# Patient Record
Sex: MALE | Race: WHITE | NOT HISPANIC OR LATINO | URBAN - METROPOLITAN AREA
[De-identification: names, ages, dates, MRNs, and addresses within clinical notes are randomized per-mention and may not be internally consistent; named-entity substitution may affect disease eponyms.]

---

## 2021-10-29 ENCOUNTER — HOSPITAL ENCOUNTER (INPATIENT)
Facility: HOSPITAL | Age: 56
LOS: 10 days | Discharge: HOME | End: 2021-11-08
Attending: EMERGENCY MEDICINE | Admitting: INTERNAL MEDICINE

## 2021-10-29 ENCOUNTER — APPOINTMENT (OUTPATIENT)
Dept: CT IMAGING | Facility: HOSPITAL | Age: 56
End: 2021-10-29

## 2021-10-29 ENCOUNTER — APPOINTMENT (OUTPATIENT)
Dept: GENERAL RADIOLOGY | Facility: HOSPITAL | Age: 56
End: 2021-10-29

## 2021-10-29 ENCOUNTER — APPOINTMENT (OUTPATIENT)
Dept: CARDIOLOGY | Facility: HOSPITAL | Age: 56
End: 2021-10-29

## 2021-10-29 DIAGNOSIS — E87.5 HYPERKALEMIA: ICD-10-CM

## 2021-10-29 DIAGNOSIS — R10.11 RIGHT UPPER QUADRANT ABDOMINAL PAIN: ICD-10-CM

## 2021-10-29 DIAGNOSIS — I48.91 ATRIAL FIBRILLATION WITH RAPID VENTRICULAR RESPONSE (HCC): Primary | ICD-10-CM

## 2021-10-29 DIAGNOSIS — R57.0 CARDIOGENIC SHOCK (HCC): ICD-10-CM

## 2021-10-29 DIAGNOSIS — R79.89 ELEVATED LFTS: ICD-10-CM

## 2021-10-29 DIAGNOSIS — N17.9 AKI (ACUTE KIDNEY INJURY) (HCC): ICD-10-CM

## 2021-10-29 PROBLEM — I34.0 MITRAL INSUFFICIENCY, ACUTE: Status: ACTIVE | Noted: 2021-10-29

## 2021-10-29 LAB
ALBUMIN SERPL-MCNC: 3.6 G/DL (ref 3.5–5.2)
ALBUMIN SERPL-MCNC: 4.4 G/DL (ref 3.5–5.2)
ALBUMIN/GLOB SERPL: 2 G/DL
ALBUMIN/GLOB SERPL: 2.1 G/DL
ALP SERPL-CCNC: 48 U/L (ref 39–117)
ALP SERPL-CCNC: 57 U/L (ref 39–117)
ALT SERPL W P-5'-P-CCNC: 1006 U/L (ref 1–41)
ALT SERPL W P-5'-P-CCNC: 808 U/L (ref 1–41)
ANION GAP SERPL CALCULATED.3IONS-SCNC: 18.8 MMOL/L (ref 5–15)
ANION GAP SERPL CALCULATED.3IONS-SCNC: 19.7 MMOL/L (ref 5–15)
ANION GAP SERPL CALCULATED.3IONS-SCNC: 21.9 MMOL/L (ref 5–15)
AORTIC DIMENSIONLESS INDEX: 0.7 (DI)
APAP SERPL-MCNC: <5 MCG/ML (ref 0–30)
APTT PPP: 28 SECONDS (ref 22.7–35.4)
ARTERIAL PATENCY WRIST A: ABNORMAL
AST SERPL-CCNC: 469 U/L (ref 1–40)
AST SERPL-CCNC: 697 U/L (ref 1–40)
ATMOSPHERIC PRESS: 736.3 MMHG
ATMOSPHERIC PRESS: 738.4 MMHG
BASE EXCESS BLDA CALC-SCNC: -11.7 MMOL/L (ref 0–2)
BASE EXCESS BLDV CALC-SCNC: -7.1 MMOL/L (ref -2–2)
BASOPHILS # BLD AUTO: 0.03 10*3/MM3 (ref 0–0.2)
BASOPHILS NFR BLD AUTO: 0.3 % (ref 0–1.5)
BDY SITE: ABNORMAL
BDY SITE: ABNORMAL
BH CV ECHO MEAS - ACS: 2.3 CM
BH CV ECHO MEAS - AO MAX PG (FULL): 0.28 MMHG
BH CV ECHO MEAS - AO MAX PG: 0.96 MMHG
BH CV ECHO MEAS - AO MEAN PG (FULL): 0.19 MMHG
BH CV ECHO MEAS - AO MEAN PG: 0.58 MMHG
BH CV ECHO MEAS - AO ROOT AREA: 9.5 CM^2
BH CV ECHO MEAS - AO ROOT DIAM: 3.5 CM
BH CV ECHO MEAS - AO V2 MAX: 48.9 CM/SEC
BH CV ECHO MEAS - AO V2 MEAN: 35.7 CM/SEC
BH CV ECHO MEAS - AO V2 VTI: 6.4 CM
BH CV ECHO MEAS - AVA(I,A): 3.2 CM^2
BH CV ECHO MEAS - AVA(I,D): 3.2 CM^2
BH CV ECHO MEAS - AVA(V,A): 3.8 CM^2
BH CV ECHO MEAS - AVA(V,D): 3.8 CM^2
BH CV ECHO MEAS - EDV(CUBED): 254.2 ML
BH CV ECHO MEAS - EDV(MOD-SP2): 178 ML
BH CV ECHO MEAS - EDV(MOD-SP4): 118 ML
BH CV ECHO MEAS - EDV(TEICH): 203.7 ML
BH CV ECHO MEAS - EF(CUBED): 33.8 %
BH CV ECHO MEAS - EF(MOD-BP): 25.3 %
BH CV ECHO MEAS - EF(MOD-SP2): 27 %
BH CV ECHO MEAS - EF(MOD-SP4): 23.7 %
BH CV ECHO MEAS - EF(TEICH): 27 %
BH CV ECHO MEAS - ESV(CUBED): 168.4 ML
BH CV ECHO MEAS - ESV(MOD-SP2): 130 ML
BH CV ECHO MEAS - ESV(MOD-SP4): 90 ML
BH CV ECHO MEAS - ESV(TEICH): 148.8 ML
BH CV ECHO MEAS - FS: 12.8 %
BH CV ECHO MEAS - IVS/LVPW: 0.98
BH CV ECHO MEAS - IVSD: 1.1 CM
BH CV ECHO MEAS - LV MASS(C)D: 313.5 GRAMS
BH CV ECHO MEAS - LV MAX PG: 0.68 MMHG
BH CV ECHO MEAS - LV MEAN PG: 0.39 MMHG
BH CV ECHO MEAS - LV V1 MAX: 41.1 CM/SEC
BH CV ECHO MEAS - LV V1 MEAN: 29.7 CM/SEC
BH CV ECHO MEAS - LV V1 VTI: 4.6 CM
BH CV ECHO MEAS - LVIDD: 6.3 CM
BH CV ECHO MEAS - LVIDS: 5.5 CM
BH CV ECHO MEAS - LVLD AP2: 9.3 CM
BH CV ECHO MEAS - LVLD AP4: 9.3 CM
BH CV ECHO MEAS - LVLS AP2: 8.5 CM
BH CV ECHO MEAS - LVLS AP4: 8.7 CM
BH CV ECHO MEAS - LVOT AREA (M): 4.5 CM^2
BH CV ECHO MEAS - LVOT AREA: 4.5 CM^2
BH CV ECHO MEAS - LVOT DIAM: 2.4 CM
BH CV ECHO MEAS - LVPWD: 1.1 CM
BH CV ECHO MEAS - MED PEAK E' VEL: 5.2 CM/SEC
BH CV ECHO MEAS - MR MAX PG: 35.3 MMHG
BH CV ECHO MEAS - MR MAX VEL: 297.3 CM/SEC
BH CV ECHO MEAS - MR MEAN PG: 19 MMHG
BH CV ECHO MEAS - MR MEAN VEL: 199.3 CM/SEC
BH CV ECHO MEAS - MR VTI: 55.1 CM
BH CV ECHO MEAS - MV DEC SLOPE: 495.1 CM/SEC^2
BH CV ECHO MEAS - MV MAX PG: 2.2 MMHG
BH CV ECHO MEAS - MV MEAN PG: 1 MMHG
BH CV ECHO MEAS - MV P1/2T MAX VEL: 77.5 CM/SEC
BH CV ECHO MEAS - MV P1/2T: 45.9 MSEC
BH CV ECHO MEAS - MV V2 MAX: 72.7 CM/SEC
BH CV ECHO MEAS - MV V2 MEAN: 46.3 CM/SEC
BH CV ECHO MEAS - MV V2 VTI: 17.1 CM
BH CV ECHO MEAS - MVA P1/2T LCG: 2.8 CM^2
BH CV ECHO MEAS - MVA(P1/2T): 4.8 CM^2
BH CV ECHO MEAS - MVA(VTI): 1.2 CM^2
BH CV ECHO MEAS - RAP SYSTOLE: 15 MMHG
BH CV ECHO MEAS - RVSP: 38 MMHG
BH CV ECHO MEAS - SV(AO): 60.7 ML
BH CV ECHO MEAS - SV(CUBED): 85.8 ML
BH CV ECHO MEAS - SV(LVOT): 20.7 ML
BH CV ECHO MEAS - SV(MOD-SP2): 48 ML
BH CV ECHO MEAS - SV(MOD-SP4): 28 ML
BH CV ECHO MEAS - SV(TEICH): 54.9 ML
BH CV ECHO MEAS - TAPSE (>1.6): 0.6 CM
BH CV ECHO MEAS - TR MAX VEL: 236.2 CM/SEC
BH CV XLRA - RV BASE: 4.1 CM
BH CV XLRA - RV LENGTH: 8.3 CM
BH CV XLRA - RV MID: 3.2 CM
BH CV XLRA - TDI S': 6.2 CM/SEC
BILIRUB SERPL-MCNC: 2.4 MG/DL (ref 0–1.2)
BILIRUB SERPL-MCNC: 2.5 MG/DL (ref 0–1.2)
BUN SERPL-MCNC: 41 MG/DL (ref 6–20)
BUN SERPL-MCNC: 42 MG/DL (ref 6–20)
BUN SERPL-MCNC: 43 MG/DL (ref 6–20)
BUN/CREAT SERPL: 20.7 (ref 7–25)
BUN/CREAT SERPL: 21.1 (ref 7–25)
BUN/CREAT SERPL: 22.7 (ref 7–25)
CALCIUM SPEC-SCNC: 7.4 MG/DL (ref 8.6–10.5)
CALCIUM SPEC-SCNC: 8.2 MG/DL (ref 8.6–10.5)
CALCIUM SPEC-SCNC: 9.5 MG/DL (ref 8.6–10.5)
CHLORIDE SERPL-SCNC: 101 MMOL/L (ref 98–107)
CHLORIDE SERPL-SCNC: 92 MMOL/L (ref 98–107)
CHLORIDE SERPL-SCNC: 95 MMOL/L (ref 98–107)
CO2 SERPL-SCNC: 11.2 MMOL/L (ref 22–29)
CO2 SERPL-SCNC: 16.3 MMOL/L (ref 22–29)
CO2 SERPL-SCNC: 17.1 MMOL/L (ref 22–29)
CREAT SERPL-MCNC: 1.81 MG/DL (ref 0.76–1.27)
CREAT SERPL-MCNC: 2.03 MG/DL (ref 0.76–1.27)
CREAT SERPL-MCNC: 2.04 MG/DL (ref 0.76–1.27)
D-LACTATE SERPL-SCNC: 6 MMOL/L (ref 0.5–2)
D-LACTATE SERPL-SCNC: 7 MMOL/L (ref 0.5–2)
D-LACTATE SERPL-SCNC: 7.2 MMOL/L (ref 0.5–2)
DEPRECATED RDW RBC AUTO: 44.9 FL (ref 37–54)
DEPRECATED RDW RBC AUTO: 45.3 FL (ref 37–54)
EOSINOPHIL # BLD AUTO: 0 10*3/MM3 (ref 0–0.4)
EOSINOPHIL NFR BLD AUTO: 0 % (ref 0.3–6.2)
ERYTHROCYTE [DISTWIDTH] IN BLOOD BY AUTOMATED COUNT: 13.6 % (ref 12.3–15.4)
ERYTHROCYTE [DISTWIDTH] IN BLOOD BY AUTOMATED COUNT: 13.8 % (ref 12.3–15.4)
GAS FLOW AIRWAY: 2 LPM
GFR SERPL CREATININE-BSD FRML MDRD: 34 ML/MIN/1.73
GFR SERPL CREATININE-BSD FRML MDRD: 34 ML/MIN/1.73
GFR SERPL CREATININE-BSD FRML MDRD: 39 ML/MIN/1.73
GLOBULIN UR ELPH-MCNC: 1.7 GM/DL
GLOBULIN UR ELPH-MCNC: 2.2 GM/DL
GLUCOSE BLDC GLUCOMTR-MCNC: 110 MG/DL (ref 70–130)
GLUCOSE SERPL-MCNC: 130 MG/DL (ref 65–99)
GLUCOSE SERPL-MCNC: 136 MG/DL (ref 65–99)
GLUCOSE SERPL-MCNC: 194 MG/DL (ref 65–99)
HAV IGM SERPL QL IA: NORMAL
HBV CORE IGM SERPL QL IA: NORMAL
HBV SURFACE AG SERPL QL IA: NORMAL
HCO3 BLDA-SCNC: 14.9 MMOL/L (ref 22–28)
HCO3 BLDV-SCNC: 18.6 MMOL/L (ref 22–28)
HCT VFR BLD AUTO: 48.4 % (ref 37.5–51)
HCT VFR BLD AUTO: 53.3 % (ref 37.5–51)
HCV AB SER DONR QL: NORMAL
HGB BLD-MCNC: 16.4 G/DL (ref 13–17.7)
HGB BLD-MCNC: 18 G/DL (ref 13–17.7)
HOLD SPECIMEN: NORMAL
INHALED O2 CONCENTRATION: 100 %
INR PPP: 1.94 (ref 0.9–1.1)
LIPASE SERPL-CCNC: 32 U/L (ref 13–60)
LYMPHOCYTES # BLD AUTO: 0.9 10*3/MM3 (ref 0.7–3.1)
LYMPHOCYTES NFR BLD AUTO: 8.8 % (ref 19.6–45.3)
MAGNESIUM SERPL-MCNC: 2.3 MG/DL (ref 1.6–2.6)
MAGNESIUM SERPL-MCNC: 2.5 MG/DL (ref 1.6–2.6)
MCH RBC QN AUTO: 30.8 PG (ref 26.6–33)
MCH RBC QN AUTO: 31 PG (ref 26.6–33)
MCHC RBC AUTO-ENTMCNC: 33.8 G/DL (ref 31.5–35.7)
MCHC RBC AUTO-ENTMCNC: 33.9 G/DL (ref 31.5–35.7)
MCV RBC AUTO: 90.8 FL (ref 79–97)
MCV RBC AUTO: 91.9 FL (ref 79–97)
MODALITY: ABNORMAL
MODALITY: ABNORMAL
MONOCYTES # BLD AUTO: 0.83 10*3/MM3 (ref 0.1–0.9)
MONOCYTES NFR BLD AUTO: 8.1 % (ref 5–12)
MR PISA EROA: 0.72 CM2
NEUTROPHILS NFR BLD AUTO: 8.38 10*3/MM3 (ref 1.7–7)
NEUTROPHILS NFR BLD AUTO: 81.8 % (ref 42.7–76)
NT-PROBNP SERPL-MCNC: ABNORMAL PG/ML (ref 0–900)
O2 A-A PPRESDIFF RESPIRATORY: 0.5 MMHG
PCO2 BLDA: 35.4 MM HG (ref 35–45)
PCO2 BLDV: 37.4 MM HG (ref 41–51)
PEEP RESPIRATORY: 5 CM[H2O]
PH BLDA: 7.23 PH UNITS (ref 7.35–7.45)
PH BLDV: 7.3 PH UNITS (ref 7.31–7.41)
PISA ALIASING VEL: 5.3 M/S
PISA RADIUS: 0.8 CM
PLATELET # BLD AUTO: 131 10*3/MM3 (ref 140–450)
PLATELET # BLD AUTO: 98 10*3/MM3 (ref 140–450)
PMV BLD AUTO: 13.1 FL (ref 6–12)
PMV BLD AUTO: 13.2 FL (ref 6–12)
PO2 BLDA: 352.2 MM HG (ref 80–100)
PO2 BLDV: 15.8 MM HG (ref 35–45)
POTASSIUM SERPL-SCNC: 5.2 MMOL/L (ref 3.5–5.2)
POTASSIUM SERPL-SCNC: 5.4 MMOL/L (ref 3.5–5.2)
POTASSIUM SERPL-SCNC: 6.2 MMOL/L (ref 3.5–5.2)
PROCALCITONIN SERPL-MCNC: 0.14 NG/ML (ref 0–0.25)
PROT SERPL-MCNC: 5.3 G/DL (ref 6–8.5)
PROT SERPL-MCNC: 6.6 G/DL (ref 6–8.5)
PROTHROMBIN TIME: 21.9 SECONDS (ref 11.7–14.2)
QT INTERVAL: 282 MS
RBC # BLD AUTO: 5.33 10*6/MM3 (ref 4.14–5.8)
RBC # BLD AUTO: 5.8 10*6/MM3 (ref 4.14–5.8)
SALICYLATES SERPL-MCNC: 4.7 MG/DL
SAO2 % BLDCOA: 18.2 % (ref 92–99)
SAO2 % BLDCOA: 99.9 % (ref 92–99)
SARS-COV-2 RNA PNL SPEC NAA+PROBE: NOT DETECTED
SET MECH RESP RATE: 16
SINUS: 3.4 CM
SODIUM SERPL-SCNC: 131 MMOL/L (ref 136–145)
STJ: 2.9 CM
TOTAL RATE: 21 BREATHS/MINUTE
TROPONIN T SERPL-MCNC: 0.04 NG/ML (ref 0–0.03)
VENTILATOR MODE: AC
VT ON VENT VENT: 500 ML
WBC # BLD AUTO: 10.24 10*3/MM3 (ref 3.4–10.8)
WBC # BLD AUTO: 11.66 10*3/MM3 (ref 3.4–10.8)
WHOLE BLOOD HOLD SPECIMEN: NORMAL
WHOLE BLOOD HOLD SPECIMEN: NORMAL

## 2021-10-29 PROCEDURE — 80048 BASIC METABOLIC PNL TOTAL CA: CPT | Performed by: EMERGENCY MEDICINE

## 2021-10-29 PROCEDURE — 93460 R&L HRT ART/VENTRICLE ANGIO: CPT | Performed by: INTERNAL MEDICINE

## 2021-10-29 PROCEDURE — C1894 INTRO/SHEATH, NON-LASER: HCPCS | Performed by: INTERNAL MEDICINE

## 2021-10-29 PROCEDURE — 83605 ASSAY OF LACTIC ACID: CPT | Performed by: INTERNAL MEDICINE

## 2021-10-29 PROCEDURE — 94799 UNLISTED PULMONARY SVC/PX: CPT

## 2021-10-29 PROCEDURE — 25010000002 DIGOXIN PER 500 MCG: Performed by: EMERGENCY MEDICINE

## 2021-10-29 PROCEDURE — 99255 IP/OBS CONSLTJ NEW/EST HI 80: CPT | Performed by: INTERNAL MEDICINE

## 2021-10-29 PROCEDURE — 85025 COMPLETE CBC W/AUTO DIFF WBC: CPT | Performed by: EMERGENCY MEDICINE

## 2021-10-29 PROCEDURE — 25010000002 MIDAZOLAM PER 1 MG

## 2021-10-29 PROCEDURE — 63710000001 INSULIN REGULAR HUMAN PER 5 UNITS: Performed by: EMERGENCY MEDICINE

## 2021-10-29 PROCEDURE — G0278 ILIAC ART ANGIO,CARDIAC CATH: HCPCS

## 2021-10-29 PROCEDURE — 80143 DRUG ASSAY ACETAMINOPHEN: CPT | Performed by: INTERNAL MEDICINE

## 2021-10-29 PROCEDURE — 93321 DOPPLER ECHO F-UP/LMTD STD: CPT | Performed by: INTERNAL MEDICINE

## 2021-10-29 PROCEDURE — 87040 BLOOD CULTURE FOR BACTERIA: CPT | Performed by: INTERNAL MEDICINE

## 2021-10-29 PROCEDURE — G0278 ILIAC ART ANGIO,CARDIAC CATH: HCPCS | Performed by: INTERNAL MEDICINE

## 2021-10-29 PROCEDURE — 85018 HEMOGLOBIN: CPT

## 2021-10-29 PROCEDURE — 85014 HEMATOCRIT: CPT

## 2021-10-29 PROCEDURE — 25010000002 CALCIUM GLUCONATE-NACL 1-0.675 GM/50ML-% SOLUTION: Performed by: EMERGENCY MEDICINE

## 2021-10-29 PROCEDURE — 93325 DOPPLER ECHO COLOR FLOW MAPG: CPT | Performed by: INTERNAL MEDICINE

## 2021-10-29 PROCEDURE — 83735 ASSAY OF MAGNESIUM: CPT | Performed by: INTERNAL MEDICINE

## 2021-10-29 PROCEDURE — 25010000002 FENTANYL CITRATE (PF) 50 MCG/ML SOLUTION: Performed by: INTERNAL MEDICINE

## 2021-10-29 PROCEDURE — 83735 ASSAY OF MAGNESIUM: CPT | Performed by: EMERGENCY MEDICINE

## 2021-10-29 PROCEDURE — C1769 GUIDE WIRE: HCPCS | Performed by: INTERNAL MEDICINE

## 2021-10-29 PROCEDURE — 25010000002 PHENYLEPHRINE 10 MG/ML SOLUTION

## 2021-10-29 PROCEDURE — 93306 TTE W/DOPPLER COMPLETE: CPT

## 2021-10-29 PROCEDURE — 85027 COMPLETE CBC AUTOMATED: CPT | Performed by: INTERNAL MEDICINE

## 2021-10-29 PROCEDURE — 31500 INSERT EMERGENCY AIRWAY: CPT

## 2021-10-29 PROCEDURE — 25010000002 AMIODARONE IN DEXTROSE 5% 360-4.14 MG/200ML-% SOLUTION: Performed by: EMERGENCY MEDICINE

## 2021-10-29 PROCEDURE — 94002 VENT MGMT INPAT INIT DAY: CPT

## 2021-10-29 PROCEDURE — 0BH17EZ INSERTION OF ENDOTRACHEAL AIRWAY INTO TRACHEA, VIA NATURAL OR ARTIFICIAL OPENING: ICD-10-PCS | Performed by: INTERNAL MEDICINE

## 2021-10-29 PROCEDURE — 93325 DOPPLER ECHO COLOR FLOW MAPG: CPT

## 2021-10-29 PROCEDURE — 80074 ACUTE HEPATITIS PANEL: CPT | Performed by: INTERNAL MEDICINE

## 2021-10-29 PROCEDURE — 80053 COMPREHEN METABOLIC PANEL: CPT | Performed by: INTERNAL MEDICINE

## 2021-10-29 PROCEDURE — 25010000002 PROPOFOL 10 MG/ML EMULSION: Performed by: INTERNAL MEDICINE

## 2021-10-29 PROCEDURE — 93010 ELECTROCARDIOGRAM REPORT: CPT | Performed by: INTERNAL MEDICINE

## 2021-10-29 PROCEDURE — 82962 GLUCOSE BLOOD TEST: CPT

## 2021-10-29 PROCEDURE — 25010000002 ONDANSETRON PER 1 MG: Performed by: EMERGENCY MEDICINE

## 2021-10-29 PROCEDURE — 83690 ASSAY OF LIPASE: CPT | Performed by: EMERGENCY MEDICINE

## 2021-10-29 PROCEDURE — 83880 ASSAY OF NATRIURETIC PEPTIDE: CPT | Performed by: EMERGENCY MEDICINE

## 2021-10-29 PROCEDURE — 0 IOPAMIDOL PER 1 ML: Performed by: INTERNAL MEDICINE

## 2021-10-29 PROCEDURE — 93312 ECHO TRANSESOPHAGEAL: CPT

## 2021-10-29 PROCEDURE — 93321 DOPPLER ECHO F-UP/LMTD STD: CPT

## 2021-10-29 PROCEDURE — 76376 3D RENDER W/INTRP POSTPROCES: CPT

## 2021-10-29 PROCEDURE — 94760 N-INVAS EAR/PLS OXIMETRY 1: CPT

## 2021-10-29 PROCEDURE — 87635 SARS-COV-2 COVID-19 AMP PRB: CPT | Performed by: EMERGENCY MEDICINE

## 2021-10-29 PROCEDURE — 85025 COMPLETE CBC W/AUTO DIFF WBC: CPT | Performed by: INTERNAL MEDICINE

## 2021-10-29 PROCEDURE — 76376 3D RENDER W/INTRP POSTPROCES: CPT | Performed by: INTERNAL MEDICINE

## 2021-10-29 PROCEDURE — 71250 CT THORAX DX C-: CPT

## 2021-10-29 PROCEDURE — 80053 COMPREHEN METABOLIC PANEL: CPT | Performed by: EMERGENCY MEDICINE

## 2021-10-29 PROCEDURE — 25010000002 PHENYLEPHRINE 10 MG/ML SOLUTION: Performed by: EMERGENCY MEDICINE

## 2021-10-29 PROCEDURE — 93005 ELECTROCARDIOGRAM TRACING: CPT | Performed by: EMERGENCY MEDICINE

## 2021-10-29 PROCEDURE — 85730 THROMBOPLASTIN TIME PARTIAL: CPT | Performed by: EMERGENCY MEDICINE

## 2021-10-29 PROCEDURE — 71045 X-RAY EXAM CHEST 1 VIEW: CPT

## 2021-10-29 PROCEDURE — 82803 BLOOD GASES ANY COMBINATION: CPT

## 2021-10-29 PROCEDURE — B2111ZZ FLUOROSCOPY OF MULTIPLE CORONARY ARTERIES USING LOW OSMOLAR CONTRAST: ICD-10-PCS | Performed by: INTERNAL MEDICINE

## 2021-10-29 PROCEDURE — 83605 ASSAY OF LACTIC ACID: CPT | Performed by: EMERGENCY MEDICINE

## 2021-10-29 PROCEDURE — 74176 CT ABD & PELVIS W/O CONTRAST: CPT

## 2021-10-29 PROCEDURE — 84484 ASSAY OF TROPONIN QUANT: CPT | Performed by: EMERGENCY MEDICINE

## 2021-10-29 PROCEDURE — 93306 TTE W/DOPPLER COMPLETE: CPT | Performed by: INTERNAL MEDICINE

## 2021-10-29 PROCEDURE — 25010000002 HEPARIN (PORCINE) PER 1000 UNITS: Performed by: INTERNAL MEDICINE

## 2021-10-29 PROCEDURE — 5A1945Z RESPIRATORY VENTILATION, 24-96 CONSECUTIVE HOURS: ICD-10-PCS | Performed by: INTERNAL MEDICINE

## 2021-10-29 PROCEDURE — 4A023N8 MEASUREMENT OF CARDIAC SAMPLING AND PRESSURE, BILATERAL, PERCUTANEOUS APPROACH: ICD-10-PCS | Performed by: INTERNAL MEDICINE

## 2021-10-29 PROCEDURE — 25010000002 PROPOFOL 10 MG/ML EMULSION: Performed by: EMERGENCY MEDICINE

## 2021-10-29 PROCEDURE — B2151ZZ FLUOROSCOPY OF LEFT HEART USING LOW OSMOLAR CONTRAST: ICD-10-PCS | Performed by: INTERNAL MEDICINE

## 2021-10-29 PROCEDURE — 84145 PROCALCITONIN (PCT): CPT | Performed by: EMERGENCY MEDICINE

## 2021-10-29 PROCEDURE — 85610 PROTHROMBIN TIME: CPT | Performed by: EMERGENCY MEDICINE

## 2021-10-29 PROCEDURE — 80179 DRUG ASSAY SALICYLATE: CPT | Performed by: INTERNAL MEDICINE

## 2021-10-29 PROCEDURE — 99284 EMERGENCY DEPT VISIT MOD MDM: CPT

## 2021-10-29 PROCEDURE — 93312 ECHO TRANSESOPHAGEAL: CPT | Performed by: INTERNAL MEDICINE

## 2021-10-29 RX ORDER — SODIUM BICARBONATE IN D5W 150/1000ML
150 PLASTIC BAG, INJECTION (ML) INTRAVENOUS CONTINUOUS
Status: DISCONTINUED | OUTPATIENT
Start: 2021-10-29 | End: 2021-10-31

## 2021-10-29 RX ORDER — DIGOXIN 0.25 MG/ML
250 INJECTION INTRAMUSCULAR; INTRAVENOUS ONCE
Status: COMPLETED | OUTPATIENT
Start: 2021-10-29 | End: 2021-10-29

## 2021-10-29 RX ORDER — LIDOCAINE HYDROCHLORIDE 20 MG/ML
INJECTION, SOLUTION INFILTRATION; PERINEURAL AS NEEDED
Status: DISCONTINUED | OUTPATIENT
Start: 2021-10-29 | End: 2021-10-29 | Stop reason: HOSPADM

## 2021-10-29 RX ORDER — SODIUM BICARBONATE IN D5W 150/1000ML
150 PLASTIC BAG, INJECTION (ML) INTRAVENOUS ONCE
Status: DISCONTINUED | OUTPATIENT
Start: 2021-10-29 | End: 2021-10-29

## 2021-10-29 RX ORDER — MIDAZOLAM HYDROCHLORIDE 1 MG/ML
INJECTION INTRAMUSCULAR; INTRAVENOUS
Status: COMPLETED
Start: 2021-10-29 | End: 2021-10-29

## 2021-10-29 RX ORDER — PROPOFOL 10 MG/ML
VIAL (ML) INTRAVENOUS
Status: COMPLETED | OUTPATIENT
Start: 2021-10-29 | End: 2021-10-29

## 2021-10-29 RX ORDER — SODIUM CHLORIDE 0.9 % (FLUSH) 0.9 %
10 SYRINGE (ML) INJECTION AS NEEDED
Status: DISCONTINUED | OUTPATIENT
Start: 2021-10-29 | End: 2021-11-01

## 2021-10-29 RX ORDER — NOREPINEPHRINE BIT/0.9 % NACL 8 MG/250ML
.02-.3 INFUSION BOTTLE (ML) INTRAVENOUS
Status: DISCONTINUED | OUTPATIENT
Start: 2021-10-29 | End: 2021-11-02

## 2021-10-29 RX ORDER — PANTOPRAZOLE SODIUM 40 MG/10ML
40 INJECTION, POWDER, LYOPHILIZED, FOR SOLUTION INTRAVENOUS DAILY
Status: DISCONTINUED | OUTPATIENT
Start: 2021-10-29 | End: 2021-11-04

## 2021-10-29 RX ORDER — PHENYLEPHRINE HCL IN 0.9% NACL 0.5 MG/5ML
.5-3 SYRINGE (ML) INTRAVENOUS
Status: DISCONTINUED | OUTPATIENT
Start: 2021-10-29 | End: 2021-11-02

## 2021-10-29 RX ORDER — ESMOLOL HYDROCHLORIDE 10 MG/ML
50-300 INJECTION, SOLUTION INTRAVENOUS
Status: DISCONTINUED | OUTPATIENT
Start: 2021-10-29 | End: 2021-10-29

## 2021-10-29 RX ORDER — HYDROCODONE BITARTRATE AND ACETAMINOPHEN 5; 325 MG/1; MG/1
1 TABLET ORAL EVERY 4 HOURS PRN
Status: DISCONTINUED | OUTPATIENT
Start: 2021-10-29 | End: 2021-11-08 | Stop reason: HOSPADM

## 2021-10-29 RX ORDER — ETOMIDATE 2 MG/ML
INJECTION INTRAVENOUS
Status: COMPLETED | OUTPATIENT
Start: 2021-10-29 | End: 2021-10-29

## 2021-10-29 RX ORDER — SODIUM CHLORIDE 9 MG/ML
250 INJECTION, SOLUTION INTRAVENOUS ONCE AS NEEDED
Status: DISCONTINUED | OUTPATIENT
Start: 2021-10-29 | End: 2021-11-01

## 2021-10-29 RX ORDER — SODIUM CHLORIDE 0.9 % (FLUSH) 0.9 %
10 SYRINGE (ML) INJECTION AS NEEDED
Status: DISCONTINUED | OUTPATIENT
Start: 2021-10-29 | End: 2021-11-06

## 2021-10-29 RX ORDER — PROPOFOL 10 MG/ML
VIAL (ML) INTRAVENOUS
Status: ACTIVE
Start: 2021-10-29 | End: 2021-10-30

## 2021-10-29 RX ORDER — CALCIUM GLUCONATE 20 MG/ML
1 INJECTION, SOLUTION INTRAVENOUS ONCE
Status: COMPLETED | OUTPATIENT
Start: 2021-10-29 | End: 2021-10-29

## 2021-10-29 RX ORDER — SODIUM CHLORIDE 0.9 % (FLUSH) 0.9 %
10 SYRINGE (ML) INJECTION EVERY 12 HOURS SCHEDULED
Status: DISCONTINUED | OUTPATIENT
Start: 2021-10-29 | End: 2021-11-08 | Stop reason: HOSPADM

## 2021-10-29 RX ORDER — SODIUM BICARBONATE IN D5W 150/1000ML
150 PLASTIC BAG, INJECTION (ML) INTRAVENOUS CONTINUOUS
Status: DISCONTINUED | OUTPATIENT
Start: 2021-10-29 | End: 2021-10-29

## 2021-10-29 RX ORDER — ONDANSETRON 2 MG/ML
4 INJECTION INTRAMUSCULAR; INTRAVENOUS ONCE
Status: COMPLETED | OUTPATIENT
Start: 2021-10-29 | End: 2021-10-29

## 2021-10-29 RX ORDER — DILTIAZEM HCL IN NACL,ISO-OSM 125 MG/125
5-15 PLASTIC BAG, INJECTION (ML) INTRAVENOUS
Status: DISCONTINUED | OUTPATIENT
Start: 2021-10-29 | End: 2021-10-29

## 2021-10-29 RX ORDER — FENTANYL CITRATE 50 UG/ML
INJECTION, SOLUTION INTRAMUSCULAR; INTRAVENOUS AS NEEDED
Status: DISCONTINUED | OUTPATIENT
Start: 2021-10-29 | End: 2021-10-29 | Stop reason: HOSPADM

## 2021-10-29 RX ORDER — MORPHINE SULFATE 2 MG/ML
1 INJECTION, SOLUTION INTRAMUSCULAR; INTRAVENOUS EVERY 4 HOURS PRN
Status: ACTIVE | OUTPATIENT
Start: 2021-10-29 | End: 2021-11-05

## 2021-10-29 RX ORDER — SODIUM CHLORIDE 9 MG/ML
INJECTION, SOLUTION INTRAVENOUS CONTINUOUS PRN
Status: COMPLETED | OUTPATIENT
Start: 2021-10-29 | End: 2021-10-29

## 2021-10-29 RX ORDER — FENTANYL CITRATE 50 UG/ML
50 INJECTION, SOLUTION INTRAMUSCULAR; INTRAVENOUS
Status: DISPENSED | OUTPATIENT
Start: 2021-10-29 | End: 2021-11-05

## 2021-10-29 RX ORDER — FUROSEMIDE 10 MG/ML
60 INJECTION INTRAMUSCULAR; INTRAVENOUS ONCE
Status: DISCONTINUED | OUTPATIENT
Start: 2021-10-29 | End: 2021-10-29

## 2021-10-29 RX ORDER — NALOXONE HCL 0.4 MG/ML
0.4 VIAL (ML) INJECTION
Status: DISCONTINUED | OUTPATIENT
Start: 2021-10-29 | End: 2021-11-08 | Stop reason: HOSPADM

## 2021-10-29 RX ORDER — DEXTROSE MONOHYDRATE 25 G/50ML
50 INJECTION, SOLUTION INTRAVENOUS ONCE
Status: COMPLETED | OUTPATIENT
Start: 2021-10-29 | End: 2021-10-29

## 2021-10-29 RX ADMIN — Medication 0.36 MCG/KG/MIN: at 20:57

## 2021-10-29 RX ADMIN — PANTOPRAZOLE SODIUM 40 MG: 40 INJECTION, POWDER, FOR SOLUTION INTRAVENOUS at 16:15

## 2021-10-29 RX ADMIN — PHENYLEPHRINE HYDROCHLORIDE 3 MCG/KG/MIN: 10 INJECTION INTRAVENOUS at 19:46

## 2021-10-29 RX ADMIN — ONDANSETRON 4 MG: 2 INJECTION INTRAMUSCULAR; INTRAVENOUS at 12:53

## 2021-10-29 RX ADMIN — DEXTROSE MONOHYDRATE 50 ML: 25 INJECTION, SOLUTION INTRAVENOUS at 14:34

## 2021-10-29 RX ADMIN — SODIUM ZIRCONIUM CYCLOSILICATE 10 G: 10 POWDER, FOR SUSPENSION ORAL at 16:09

## 2021-10-29 RX ADMIN — PHENYLEPHRINE HYDROCHLORIDE 2 MCG/KG/MIN: 10 INJECTION INTRAVENOUS at 16:44

## 2021-10-29 RX ADMIN — SODIUM CHLORIDE, PRESERVATIVE FREE 10 ML: 5 INJECTION INTRAVENOUS at 21:14

## 2021-10-29 RX ADMIN — PROPOFOL 20 MCG/KG/MIN: 10 INJECTION, EMULSION INTRAVENOUS at 16:50

## 2021-10-29 RX ADMIN — Medication 0.6 MCG/KG/MIN: at 18:36

## 2021-10-29 RX ADMIN — SODIUM CHLORIDE 1000 ML: 9 INJECTION, SOLUTION INTRAVENOUS at 13:40

## 2021-10-29 RX ADMIN — Medication 150 MEQ: at 16:10

## 2021-10-29 RX ADMIN — ETOMIDATE 10 MG: 2 INJECTION, SOLUTION INTRAVENOUS at 16:34

## 2021-10-29 RX ADMIN — SODIUM CHLORIDE 1000 ML: 9 INJECTION, SOLUTION INTRAVENOUS at 12:53

## 2021-10-29 RX ADMIN — CALCIUM GLUCONATE 1 G: 20 INJECTION, SOLUTION INTRAVENOUS at 14:40

## 2021-10-29 RX ADMIN — ETOMIDATE 30 MG: 2 INJECTION, SOLUTION INTRAVENOUS at 16:30

## 2021-10-29 RX ADMIN — SODIUM BICARBONATE 50 MEQ: 84 INJECTION, SOLUTION INTRAVENOUS at 14:39

## 2021-10-29 RX ADMIN — Medication 150 MEQ: at 18:01

## 2021-10-29 RX ADMIN — PROPOFOL 10 MCG/KG/MIN: 10 INJECTION, EMULSION INTRAVENOUS at 16:35

## 2021-10-29 RX ADMIN — DIGOXIN 250 MCG: 0.25 INJECTION INTRAMUSCULAR; INTRAVENOUS at 16:26

## 2021-10-29 RX ADMIN — INSULIN HUMAN 10 UNITS: 100 INJECTION, SOLUTION PARENTERAL at 14:36

## 2021-10-29 RX ADMIN — MIDAZOLAM 2 MG: 1 INJECTION INTRAMUSCULAR; INTRAVENOUS at 16:52

## 2021-10-29 RX ADMIN — AMIODARONE HYDROCHLORIDE 1 MG/MIN: 1.8 INJECTION, SOLUTION INTRAVENOUS at 13:38

## 2021-10-29 RX ADMIN — ESMOLOL HYDROCHLORIDE 50 MCG/KG/MIN: 10 INJECTION INTRAVENOUS at 14:42

## 2021-10-29 RX ADMIN — Medication 0.03 MCG/KG/MIN: at 16:23

## 2021-10-29 RX ADMIN — PROPOFOL 50 MCG/KG/MIN: 10 INJECTION, EMULSION INTRAVENOUS at 20:34

## 2021-10-30 ENCOUNTER — APPOINTMENT (OUTPATIENT)
Dept: CARDIOLOGY | Facility: HOSPITAL | Age: 56
End: 2021-10-30

## 2021-10-30 ENCOUNTER — APPOINTMENT (OUTPATIENT)
Dept: GENERAL RADIOLOGY | Facility: HOSPITAL | Age: 56
End: 2021-10-30

## 2021-10-30 PROBLEM — D69.6 THROMBOCYTOPENIA (HCC): Status: ACTIVE | Noted: 2021-10-30

## 2021-10-30 PROBLEM — D68.9 COAGULOPATHY (HCC): Status: ACTIVE | Noted: 2021-10-30

## 2021-10-30 LAB
ABO GROUP BLD: NORMAL
ACT BLD: 125 SECONDS (ref 82–152)
ACT BLD: 219 SECONDS (ref 82–152)
ACT BLD: 246 SECONDS (ref 82–152)
ACT BLD: 257 SECONDS (ref 82–152)
ALBUMIN SERPL-MCNC: 2.8 G/DL (ref 3.5–5.2)
ALBUMIN SERPL-MCNC: 3.3 G/DL (ref 3.5–5.2)
ALBUMIN/GLOB SERPL: 1.8 G/DL
ALBUMIN/GLOB SERPL: 1.9 G/DL
ALP SERPL-CCNC: 47 U/L (ref 39–117)
ALP SERPL-CCNC: 49 U/L (ref 39–117)
ALT SERPL W P-5'-P-CCNC: 3539 U/L (ref 1–41)
ALT SERPL W P-5'-P-CCNC: 5772 U/L (ref 1–41)
ANION GAP SERPL CALCULATED.3IONS-SCNC: 15.8 MMOL/L (ref 5–15)
ANION GAP SERPL CALCULATED.3IONS-SCNC: 17.1 MMOL/L (ref 5–15)
APTT PPP: 41.5 SECONDS (ref 22.7–35.4)
ARTERIAL PATENCY WRIST A: ABNORMAL
AST SERPL-CCNC: 3447 U/L (ref 1–40)
AST SERPL-CCNC: >7000 U/L (ref 1–40)
ATMOSPHERIC PRESS: 736.1 MMHG
ATMOSPHERIC PRESS: 737.9 MMHG
ATMOSPHERIC PRESS: 738.5 MMHG
BACTERIA UR QL AUTO: ABNORMAL /HPF
BASE EXCESS BLDA CALC-SCNC: -1.2 MMOL/L (ref 0–2)
BASE EXCESS BLDA CALC-SCNC: -2.4 MMOL/L (ref 0–2)
BASE EXCESS BLDA CALC-SCNC: -6.1 MMOL/L (ref 0–2)
BASOPHILS # BLD AUTO: 0.02 10*3/MM3 (ref 0–0.2)
BASOPHILS # BLD AUTO: 0.02 10*3/MM3 (ref 0–0.2)
BASOPHILS NFR BLD AUTO: 0.1 % (ref 0–1.5)
BASOPHILS NFR BLD AUTO: 0.2 % (ref 0–1.5)
BDY SITE: ABNORMAL
BH BB BLOOD EXPIRATION DATE: NORMAL
BH BB BLOOD EXPIRATION DATE: NORMAL
BH BB BLOOD TYPE BARCODE: 8400
BH BB BLOOD TYPE BARCODE: 8400
BH BB DISPENSE STATUS: NORMAL
BH BB DISPENSE STATUS: NORMAL
BH BB PRODUCT CODE: NORMAL
BH BB PRODUCT CODE: NORMAL
BH BB UNIT NUMBER: NORMAL
BH BB UNIT NUMBER: NORMAL
BH CV ECHO MEAS - RAP SYSTOLE: 15 MMHG
BH CV ECHO MEAS - RVSP: 42 MMHG
BH CV ECHO MEAS - TR MAX VEL: 135.7 CM/SEC
BH CV ECHO MEAS - TR MAX VEL: 255 CM/SEC
BILIRUB SERPL-MCNC: 2.3 MG/DL (ref 0–1.2)
BILIRUB SERPL-MCNC: 2.8 MG/DL (ref 0–1.2)
BILIRUB UR QL STRIP: ABNORMAL
BLD GP AB SCN SERPL QL: NEGATIVE
BUN SERPL-MCNC: 43 MG/DL (ref 6–20)
BUN SERPL-MCNC: 46 MG/DL (ref 6–20)
BUN/CREAT SERPL: 17.6 (ref 7–25)
BUN/CREAT SERPL: 19.8 (ref 7–25)
CA-I BLD-MCNC: 4 MG/DL (ref 4.6–5.4)
CA-I BLD-MCNC: 4.1 MG/DL (ref 4.6–5.4)
CA-I SERPL ISE-MCNC: 1.01 MMOL/L (ref 1.15–1.35)
CA-I SERPL ISE-MCNC: 1.03 MMOL/L (ref 1.15–1.35)
CALCIUM SPEC-SCNC: 7.1 MG/DL (ref 8.6–10.5)
CALCIUM SPEC-SCNC: 7.4 MG/DL (ref 8.6–10.5)
CHLORIDE SERPL-SCNC: 101 MMOL/L (ref 98–107)
CHLORIDE SERPL-SCNC: 98 MMOL/L (ref 98–107)
CK SERPL-CCNC: 351 U/L (ref 20–200)
CK SERPL-CCNC: 426 U/L (ref 20–200)
CLARITY UR: CLEAR
CO2 SERPL-SCNC: 14.9 MMOL/L (ref 22–29)
CO2 SERPL-SCNC: 19.2 MMOL/L (ref 22–29)
COLOR UR: ABNORMAL
CREAT SERPL-MCNC: 2.23 MG/DL (ref 0.76–1.27)
CREAT SERPL-MCNC: 2.32 MG/DL (ref 0.76–1.27)
CREAT SERPL-MCNC: 2.45 MG/DL (ref 0.76–1.27)
CREAT SERPL-MCNC: 2.62 MG/DL (ref 0.76–1.27)
CREAT UR-MCNC: 139.4 MG/DL
D-LACTATE SERPL-SCNC: 3 MMOL/L (ref 0.5–2)
D-LACTATE SERPL-SCNC: 3 MMOL/L (ref 0.5–2)
D-LACTATE SERPL-SCNC: 3.1 MMOL/L (ref 0.5–2)
D-LACTATE SERPL-SCNC: 3.9 MMOL/L (ref 0.5–2)
DEPRECATED RDW RBC AUTO: 47.3 FL (ref 37–54)
DEPRECATED RDW RBC AUTO: 49.6 FL (ref 37–54)
EOSINOPHIL # BLD AUTO: 0 10*3/MM3 (ref 0–0.4)
EOSINOPHIL # BLD AUTO: 0 10*3/MM3 (ref 0–0.4)
EOSINOPHIL NFR BLD AUTO: 0 % (ref 0.3–6.2)
EOSINOPHIL NFR BLD AUTO: 0 % (ref 0.3–6.2)
ERYTHROCYTE [DISTWIDTH] IN BLOOD BY AUTOMATED COUNT: 14.3 % (ref 12.3–15.4)
ERYTHROCYTE [DISTWIDTH] IN BLOOD BY AUTOMATED COUNT: 14.5 % (ref 12.3–15.4)
FIBRINOGEN PPP-MCNC: 76 MG/DL (ref 219–464)
GFR SERPL CREATININE-BSD FRML MDRD: 25 ML/MIN/1.73
GFR SERPL CREATININE-BSD FRML MDRD: 27 ML/MIN/1.73
GFR SERPL CREATININE-BSD FRML MDRD: 29 ML/MIN/1.73
GFR SERPL CREATININE-BSD FRML MDRD: 31 ML/MIN/1.73
GLOBULIN UR ELPH-MCNC: 1.5 GM/DL
GLOBULIN UR ELPH-MCNC: 1.8 GM/DL
GLUCOSE BLDC GLUCOMTR-MCNC: 100 MG/DL (ref 70–130)
GLUCOSE BLDC GLUCOMTR-MCNC: 103 MG/DL (ref 70–130)
GLUCOSE BLDC GLUCOMTR-MCNC: 107 MG/DL (ref 70–130)
GLUCOSE BLDC GLUCOMTR-MCNC: 135 MG/DL (ref 70–130)
GLUCOSE SERPL-MCNC: 143 MG/DL (ref 65–99)
GLUCOSE SERPL-MCNC: 149 MG/DL (ref 65–99)
GLUCOSE UR STRIP-MCNC: NEGATIVE MG/DL
HCO3 BLDA-SCNC: 17.6 MMOL/L (ref 22–28)
HCO3 BLDA-SCNC: 22.5 MMOL/L (ref 22–28)
HCO3 BLDA-SCNC: 22.8 MMOL/L (ref 22–28)
HCT VFR BLD AUTO: 43 % (ref 37.5–51)
HCT VFR BLD AUTO: 49.1 % (ref 37.5–51)
HCT VFR BLD AUTO: 50.2 % (ref 37.5–51)
HCT VFR BLDA CALC: 47 % (ref 38–51)
HCT VFR BLDA CALC: 52 % (ref 38–51)
HGB BLD-MCNC: 14.7 G/DL (ref 13–17.7)
HGB BLD-MCNC: 16.2 G/DL (ref 13–17.7)
HGB BLD-MCNC: 16.5 G/DL (ref 13–17.7)
HGB BLDA-MCNC: 16 G/DL (ref 12–17)
HGB BLDA-MCNC: 17.7 G/DL (ref 12–17)
HGB UR QL STRIP.AUTO: ABNORMAL
HYALINE CASTS UR QL AUTO: ABNORMAL /LPF
IMM GRANULOCYTES # BLD AUTO: 0.09 10*3/MM3 (ref 0–0.05)
IMM GRANULOCYTES # BLD AUTO: 0.1 10*3/MM3 (ref 0–0.05)
IMM GRANULOCYTES NFR BLD AUTO: 0.7 % (ref 0–0.5)
IMM GRANULOCYTES NFR BLD AUTO: 0.9 % (ref 0–0.5)
INHALED O2 CONCENTRATION: 100 %
INHALED O2 CONCENTRATION: 35 %
INHALED O2 CONCENTRATION: 40 %
INR PPP: 3.52 (ref 0.9–1.1)
INR PPP: 3.87 (ref 0.9–1.1)
KETONES UR QL STRIP: ABNORMAL
LEUKOCYTE ESTERASE UR QL STRIP.AUTO: ABNORMAL
LYMPHOCYTES # BLD AUTO: 0.52 10*3/MM3 (ref 0.7–3.1)
LYMPHOCYTES # BLD AUTO: 0.94 10*3/MM3 (ref 0.7–3.1)
LYMPHOCYTES NFR BLD AUTO: 4.4 % (ref 19.6–45.3)
LYMPHOCYTES NFR BLD AUTO: 6.9 % (ref 19.6–45.3)
MAGNESIUM SERPL-MCNC: 2.2 MG/DL (ref 1.6–2.6)
MAXIMAL PREDICTED HEART RATE: 163 BPM
MAXIMAL PREDICTED HEART RATE: 163 BPM
MCH RBC QN AUTO: 30.3 PG (ref 26.6–33)
MCH RBC QN AUTO: 31.3 PG (ref 26.6–33)
MCHC RBC AUTO-ENTMCNC: 32.3 G/DL (ref 31.5–35.7)
MCHC RBC AUTO-ENTMCNC: 33.6 G/DL (ref 31.5–35.7)
MCV RBC AUTO: 93.2 FL (ref 79–97)
MCV RBC AUTO: 94 FL (ref 79–97)
MODALITY: ABNORMAL
MONOCYTES # BLD AUTO: 1.07 10*3/MM3 (ref 0.1–0.9)
MONOCYTES # BLD AUTO: 1.33 10*3/MM3 (ref 0.1–0.9)
MONOCYTES NFR BLD AUTO: 11.4 % (ref 5–12)
MONOCYTES NFR BLD AUTO: 7.9 % (ref 5–12)
NEUTROPHILS NFR BLD AUTO: 11.41 10*3/MM3 (ref 1.7–7)
NEUTROPHILS NFR BLD AUTO: 83.1 % (ref 42.7–76)
NEUTROPHILS NFR BLD AUTO: 84.4 % (ref 42.7–76)
NEUTROPHILS NFR BLD AUTO: 9.73 10*3/MM3 (ref 1.7–7)
NITRITE UR QL STRIP: NEGATIVE
NRBC BLD AUTO-RTO: 0.3 /100 WBC (ref 0–0.2)
NRBC BLD AUTO-RTO: 0.4 /100 WBC (ref 0–0.2)
O2 A-A PPRESDIFF RESPIRATORY: 0.6 MMHG
O2 A-A PPRESDIFF RESPIRATORY: 0.6 MMHG
O2 A-A PPRESDIFF RESPIRATORY: 0.7 MMHG
OSMOLALITY UR: 446 MOSM/KG
PCO2 BLDA: 29.7 MM HG (ref 35–45)
PCO2 BLDA: 35.3 MM HG (ref 35–45)
PCO2 BLDA: 38.6 MM HG (ref 35–45)
PEEP RESPIRATORY: 10 CM[H2O]
PEEP RESPIRATORY: 10 CM[H2O]
PEEP RESPIRATORY: 12 CM[H2O]
PH BLDA: 7.38 PH UNITS (ref 7.35–7.45)
PH BLDA: 7.38 PH UNITS (ref 7.35–7.45)
PH BLDA: 7.42 PH UNITS (ref 7.35–7.45)
PH UR STRIP.AUTO: <=5 [PH] (ref 5–8)
PLATELET # BLD AUTO: 71 10*3/MM3 (ref 140–450)
PLATELET # BLD AUTO: 92 10*3/MM3 (ref 140–450)
PMV BLD AUTO: 12.5 FL (ref 6–12)
PMV BLD AUTO: 14.2 FL (ref 6–12)
PO2 BLDA: 134.2 MM HG (ref 80–100)
PO2 BLDA: 141 MM HG (ref 80–100)
PO2 BLDA: 479.2 MM HG (ref 80–100)
POTASSIUM SERPL-SCNC: 3.8 MMOL/L (ref 3.5–5.2)
POTASSIUM SERPL-SCNC: 5.4 MMOL/L (ref 3.5–5.2)
PROT SERPL-MCNC: 4.3 G/DL (ref 6–8.5)
PROT SERPL-MCNC: 5.1 G/DL (ref 6–8.5)
PROT UR QL STRIP: ABNORMAL
PROT UR-MCNC: 94 MG/DL
PROT/CREAT UR: 674.3 MG/G CREA (ref 0–200)
PROTHROMBIN TIME: 35 SECONDS (ref 11.7–14.2)
PROTHROMBIN TIME: 37.7 SECONDS (ref 11.7–14.2)
QT INTERVAL: 307 MS
RBC # BLD AUTO: 5.27 10*6/MM3 (ref 4.14–5.8)
RBC # BLD AUTO: 5.34 10*6/MM3 (ref 4.14–5.8)
RBC # UR: ABNORMAL /HPF
REF LAB TEST METHOD: ABNORMAL
RH BLD: POSITIVE
SAO2 % BLDA: 100 % (ref 95–98)
SAO2 % BLDA: 62 % (ref 95–98)
SAO2 % BLDCOA: 100 % (ref 92–99)
SAO2 % BLDCOA: 99.1 % (ref 92–99)
SAO2 % BLDCOA: 99.1 % (ref 92–99)
SET MECH RESP RATE: 16
SET MECH RESP RATE: 16
SET MECH RESP RATE: 20
SODIUM SERPL-SCNC: 130 MMOL/L (ref 136–145)
SODIUM SERPL-SCNC: 136 MMOL/L (ref 136–145)
SODIUM UR-SCNC: <20 MMOL/L
SP GR UR STRIP: >=1.03 (ref 1–1.03)
SQUAMOUS #/AREA URNS HPF: ABNORMAL /HPF
STRESS TARGET HR: 139 BPM
STRESS TARGET HR: 139 BPM
T&S EXPIRATION DATE: NORMAL
TOTAL RATE: 16 BREATHS/MINUTE
TOTAL RATE: 17 BREATHS/MINUTE
TOTAL RATE: 20 BREATHS/MINUTE
TSH SERPL DL<=0.05 MIU/L-ACNC: 1.01 UIU/ML (ref 0.27–4.2)
UNIT  ABO: NORMAL
UNIT  ABO: NORMAL
UNIT  RH: NORMAL
UNIT  RH: NORMAL
URATE SERPL-MCNC: 16.8 MG/DL (ref 3.4–7)
UROBILINOGEN UR QL STRIP: ABNORMAL
VENTILATOR MODE: ABNORMAL
VENTILATOR MODE: ABNORMAL
VENTILATOR MODE: AC
VT ON VENT VENT: 586 ML
VT ON VENT VENT: 596 ML
WBC # BLD AUTO: 11.7 10*3/MM3 (ref 3.4–10.8)
WBC # BLD AUTO: 13.53 10*3/MM3 (ref 3.4–10.8)
WBC UR QL AUTO: ABNORMAL /HPF

## 2021-10-30 PROCEDURE — 84550 ASSAY OF BLOOD/URIC ACID: CPT | Performed by: INTERNAL MEDICINE

## 2021-10-30 PROCEDURE — 25010000002 HEPARIN (PORCINE) PER 1000 UNITS: Performed by: INTERNAL MEDICINE

## 2021-10-30 PROCEDURE — 82570 ASSAY OF URINE CREATININE: CPT | Performed by: INTERNAL MEDICINE

## 2021-10-30 PROCEDURE — 80053 COMPREHEN METABOLIC PANEL: CPT | Performed by: INTERNAL MEDICINE

## 2021-10-30 PROCEDURE — C1894 INTRO/SHEATH, NON-LASER: HCPCS | Performed by: INTERNAL MEDICINE

## 2021-10-30 PROCEDURE — 86900 BLOOD TYPING SEROLOGIC ABO: CPT | Performed by: INTERNAL MEDICINE

## 2021-10-30 PROCEDURE — 86927 PLASMA FRESH FROZEN: CPT

## 2021-10-30 PROCEDURE — 99291 CRITICAL CARE FIRST HOUR: CPT | Performed by: INTERNAL MEDICINE

## 2021-10-30 PROCEDURE — P9012 CRYOPRECIPITATE EACH UNIT: HCPCS

## 2021-10-30 PROCEDURE — 87205 SMEAR GRAM STAIN: CPT | Performed by: INTERNAL MEDICINE

## 2021-10-30 PROCEDURE — 25010000002 AMIODARONE IN DEXTROSE 5% 150-4.21 MG/100ML-% SOLUTION: Performed by: INTERNAL MEDICINE

## 2021-10-30 PROCEDURE — 94799 UNLISTED PULMONARY SVC/PX: CPT

## 2021-10-30 PROCEDURE — 25010000002 PROPOFOL 10 MG/ML EMULSION: Performed by: INTERNAL MEDICINE

## 2021-10-30 PROCEDURE — 93308 TTE F-UP OR LMTD: CPT

## 2021-10-30 PROCEDURE — 93308 TTE F-UP OR LMTD: CPT | Performed by: INTERNAL MEDICINE

## 2021-10-30 PROCEDURE — 83935 ASSAY OF URINE OSMOLALITY: CPT | Performed by: INTERNAL MEDICINE

## 2021-10-30 PROCEDURE — 86901 BLOOD TYPING SEROLOGIC RH(D): CPT | Performed by: INTERNAL MEDICINE

## 2021-10-30 PROCEDURE — 85014 HEMATOCRIT: CPT | Performed by: INTERNAL MEDICINE

## 2021-10-30 PROCEDURE — 82565 ASSAY OF CREATININE: CPT | Performed by: INTERNAL MEDICINE

## 2021-10-30 PROCEDURE — 85384 FIBRINOGEN ACTIVITY: CPT | Performed by: INTERNAL MEDICINE

## 2021-10-30 PROCEDURE — C1769 GUIDE WIRE: HCPCS | Performed by: INTERNAL MEDICINE

## 2021-10-30 PROCEDURE — 85730 THROMBOPLASTIN TIME PARTIAL: CPT | Performed by: INTERNAL MEDICINE

## 2021-10-30 PROCEDURE — 33990 INSJ PERQ VAD L HRT ARTERIAL: CPT | Performed by: INTERNAL MEDICINE

## 2021-10-30 PROCEDURE — C1889 IMPLANT/INSERT DEVICE, NOC: HCPCS | Performed by: INTERNAL MEDICINE

## 2021-10-30 PROCEDURE — 02HA3RZ INSERTION OF SHORT-TERM EXTERNAL HEART ASSIST SYSTEM INTO HEART, PERCUTANEOUS APPROACH: ICD-10-PCS | Performed by: INTERNAL MEDICINE

## 2021-10-30 PROCEDURE — 84300 ASSAY OF URINE SODIUM: CPT | Performed by: INTERNAL MEDICINE

## 2021-10-30 PROCEDURE — 82803 BLOOD GASES ANY COMBINATION: CPT

## 2021-10-30 PROCEDURE — 84156 ASSAY OF PROTEIN URINE: CPT | Performed by: INTERNAL MEDICINE

## 2021-10-30 PROCEDURE — 85018 HEMOGLOBIN: CPT | Performed by: INTERNAL MEDICINE

## 2021-10-30 PROCEDURE — 82550 ASSAY OF CK (CPK): CPT | Performed by: INTERNAL MEDICINE

## 2021-10-30 PROCEDURE — 83605 ASSAY OF LACTIC ACID: CPT | Performed by: INTERNAL MEDICINE

## 2021-10-30 PROCEDURE — 36245 INS CATH ABD/L-EXT ART 1ST: CPT | Performed by: INTERNAL MEDICINE

## 2021-10-30 PROCEDURE — 85347 COAGULATION TIME ACTIVATED: CPT

## 2021-10-30 PROCEDURE — 81001 URINALYSIS AUTO W/SCOPE: CPT | Performed by: INTERNAL MEDICINE

## 2021-10-30 PROCEDURE — 82962 GLUCOSE BLOOD TEST: CPT

## 2021-10-30 PROCEDURE — 25010000002 AMIODARONE PER 30 MG: Performed by: INTERNAL MEDICINE

## 2021-10-30 PROCEDURE — 93005 ELECTROCARDIOGRAM TRACING: CPT | Performed by: INTERNAL MEDICINE

## 2021-10-30 PROCEDURE — 75716 ARTERY X-RAYS ARMS/LEGS: CPT | Performed by: INTERNAL MEDICINE

## 2021-10-30 PROCEDURE — 5A0221D ASSISTANCE WITH CARDIAC OUTPUT USING IMPELLER PUMP, CONTINUOUS: ICD-10-PCS | Performed by: INTERNAL MEDICINE

## 2021-10-30 PROCEDURE — 85025 COMPLETE CBC W/AUTO DIFF WBC: CPT | Performed by: INTERNAL MEDICINE

## 2021-10-30 PROCEDURE — 99233 SBSQ HOSP IP/OBS HIGH 50: CPT | Performed by: INTERNAL MEDICINE

## 2021-10-30 PROCEDURE — 85610 PROTHROMBIN TIME: CPT | Performed by: INTERNAL MEDICINE

## 2021-10-30 PROCEDURE — 92960 CARDIOVERSION ELECTRIC EXT: CPT | Performed by: INTERNAL MEDICINE

## 2021-10-30 PROCEDURE — 93325 DOPPLER ECHO COLOR FLOW MAPG: CPT | Performed by: INTERNAL MEDICINE

## 2021-10-30 PROCEDURE — 93010 ELECTROCARDIOGRAM REPORT: CPT | Performed by: INTERNAL MEDICINE

## 2021-10-30 PROCEDURE — 85018 HEMOGLOBIN: CPT

## 2021-10-30 PROCEDURE — 71045 X-RAY EXAM CHEST 1 VIEW: CPT

## 2021-10-30 PROCEDURE — 94760 N-INVAS EAR/PLS OXIMETRY 1: CPT

## 2021-10-30 PROCEDURE — 5A2204Z RESTORATION OF CARDIAC RHYTHM, SINGLE: ICD-10-PCS | Performed by: INTERNAL MEDICINE

## 2021-10-30 PROCEDURE — 25010000002 PIPERACILLIN SOD-TAZOBACTAM PER 1 G: Performed by: INTERNAL MEDICINE

## 2021-10-30 PROCEDURE — 85014 HEMATOCRIT: CPT

## 2021-10-30 PROCEDURE — 03HY32Z INSERTION OF MONITORING DEVICE INTO UPPER ARTERY, PERCUTANEOUS APPROACH: ICD-10-PCS | Performed by: INTERNAL MEDICINE

## 2021-10-30 PROCEDURE — 87070 CULTURE OTHR SPECIMN AEROBIC: CPT | Performed by: INTERNAL MEDICINE

## 2021-10-30 PROCEDURE — 36430 TRANSFUSION BLD/BLD COMPNT: CPT

## 2021-10-30 PROCEDURE — 82330 ASSAY OF CALCIUM: CPT | Performed by: INTERNAL MEDICINE

## 2021-10-30 PROCEDURE — 25010000002 PHENYLEPHRINE 10 MG/ML SOLUTION: Performed by: INTERNAL MEDICINE

## 2021-10-30 PROCEDURE — 83735 ASSAY OF MAGNESIUM: CPT | Performed by: INTERNAL MEDICINE

## 2021-10-30 PROCEDURE — P9017 PLASMA 1 DONOR FRZ W/IN 8 HR: HCPCS

## 2021-10-30 PROCEDURE — 93325 DOPPLER ECHO COLOR FLOW MAPG: CPT

## 2021-10-30 PROCEDURE — 0 IOPAMIDOL PER 1 ML: Performed by: INTERNAL MEDICINE

## 2021-10-30 PROCEDURE — 0 PHYTONADIONE 10 MG/ML SOLUTION 1 ML AMPULE: Performed by: INTERNAL MEDICINE

## 2021-10-30 PROCEDURE — 86850 RBC ANTIBODY SCREEN: CPT | Performed by: INTERNAL MEDICINE

## 2021-10-30 PROCEDURE — 84443 ASSAY THYROID STIM HORMONE: CPT | Performed by: INTERNAL MEDICINE

## 2021-10-30 RX ORDER — HEPARIN SODIUM 1000 [USP'U]/ML
INJECTION, SOLUTION INTRAVENOUS; SUBCUTANEOUS AS NEEDED
Status: DISCONTINUED | OUTPATIENT
Start: 2021-10-30 | End: 2021-10-30 | Stop reason: HOSPADM

## 2021-10-30 RX ORDER — AMIODARONE HYDROCHLORIDE 50 MG/ML
INJECTION, SOLUTION INTRAVENOUS AS NEEDED
Status: DISCONTINUED | OUTPATIENT
Start: 2021-10-30 | End: 2021-10-30 | Stop reason: HOSPADM

## 2021-10-30 RX ORDER — LIDOCAINE HYDROCHLORIDE 20 MG/ML
INJECTION, SOLUTION INFILTRATION; PERINEURAL AS NEEDED
Status: DISCONTINUED | OUTPATIENT
Start: 2021-10-30 | End: 2021-10-30 | Stop reason: HOSPADM

## 2021-10-30 RX ORDER — HEPARIN SODIUM 10000 [USP'U]/100ML
8 INJECTION, SOLUTION INTRAVENOUS
Status: DISCONTINUED | OUTPATIENT
Start: 2021-10-30 | End: 2021-10-30

## 2021-10-30 RX ADMIN — PANTOPRAZOLE SODIUM 40 MG: 40 INJECTION, POWDER, FOR SOLUTION INTRAVENOUS at 08:56

## 2021-10-30 RX ADMIN — PHYTONADIONE 10 MG: 10 INJECTION, EMULSION INTRAMUSCULAR; INTRAVENOUS; SUBCUTANEOUS at 12:44

## 2021-10-30 RX ADMIN — HEPARIN SODIUM 14 ML/HR: 5000 INJECTION INTRAVENOUS; SUBCUTANEOUS at 17:26

## 2021-10-30 RX ADMIN — Medication 150 MEQ: at 14:06

## 2021-10-30 RX ADMIN — Medication 0.3 MCG/KG/MIN: at 02:07

## 2021-10-30 RX ADMIN — Medication 150 MEQ: at 02:03

## 2021-10-30 RX ADMIN — SODIUM CHLORIDE, PRESERVATIVE FREE 10 ML: 5 INJECTION INTRAVENOUS at 08:56

## 2021-10-30 RX ADMIN — Medication 0.08 MCG/KG/MIN: at 19:03

## 2021-10-30 RX ADMIN — TAZOBACTAM SODIUM AND PIPERACILLIN SODIUM 3.38 G: 375; 3 INJECTION, SOLUTION INTRAVENOUS at 05:10

## 2021-10-30 RX ADMIN — TAZOBACTAM SODIUM AND PIPERACILLIN SODIUM 3.38 G: 375; 3 INJECTION, SOLUTION INTRAVENOUS at 20:00

## 2021-10-30 RX ADMIN — PHENYLEPHRINE HYDROCHLORIDE 0.5 MCG/KG/MIN: 10 INJECTION INTRAVENOUS at 01:49

## 2021-10-30 RX ADMIN — TAZOBACTAM SODIUM AND PIPERACILLIN SODIUM 3.38 G: 375; 3 INJECTION, SOLUTION INTRAVENOUS at 00:07

## 2021-10-30 RX ADMIN — PROPOFOL 15 MCG/KG/MIN: 10 INJECTION, EMULSION INTRAVENOUS at 09:55

## 2021-10-30 RX ADMIN — PROPOFOL 25 MCG/KG/MIN: 10 INJECTION, EMULSION INTRAVENOUS at 21:00

## 2021-10-30 RX ADMIN — TAZOBACTAM SODIUM AND PIPERACILLIN SODIUM 3.38 G: 375; 3 INJECTION, SOLUTION INTRAVENOUS at 12:06

## 2021-10-30 RX ADMIN — Medication 0.26 MCG/KG/MIN: at 07:02

## 2021-10-30 RX ADMIN — AMIODARONE HYDROCHLORIDE 150 MG: 1.5 INJECTION, SOLUTION INTRAVENOUS at 11:25

## 2021-10-30 RX ADMIN — PROPOFOL 30 MCG/KG/MIN: 10 INJECTION, EMULSION INTRAVENOUS at 18:19

## 2021-10-30 RX ADMIN — PROPOFOL 30 MCG/KG/MIN: 10 INJECTION, EMULSION INTRAVENOUS at 02:01

## 2021-10-30 RX ADMIN — Medication 0.26 MCG/KG/MIN: at 12:20

## 2021-10-31 ENCOUNTER — APPOINTMENT (OUTPATIENT)
Dept: CARDIOLOGY | Facility: HOSPITAL | Age: 56
End: 2021-10-31

## 2021-10-31 ENCOUNTER — APPOINTMENT (OUTPATIENT)
Dept: GENERAL RADIOLOGY | Facility: HOSPITAL | Age: 56
End: 2021-10-31

## 2021-10-31 PROBLEM — D64.89 OTHER SPECIFIED ANEMIAS: Status: ACTIVE | Noted: 2021-10-31

## 2021-10-31 PROBLEM — D68.8 HYPOFIBRINOGENEMIA (HCC): Status: ACTIVE | Noted: 2021-10-31

## 2021-10-31 LAB
ALBUMIN SERPL-MCNC: 2.7 G/DL (ref 3.5–5.2)
ALBUMIN/GLOB SERPL: 1.9 G/DL
ALP SERPL-CCNC: 47 U/L (ref 39–117)
ALT SERPL W P-5'-P-CCNC: 5215 U/L (ref 1–41)
ANION GAP SERPL CALCULATED.3IONS-SCNC: 13.4 MMOL/L (ref 5–15)
APTT PPP: 56.3 SECONDS (ref 22.7–35.4)
APTT PPP: 58.4 SECONDS (ref 22.7–35.4)
ARTERIAL PATENCY WRIST A: ABNORMAL
ARTERIAL PATENCY WRIST A: NORMAL
ARTERIAL PATENCY WRIST A: POSITIVE
AST SERPL-CCNC: >7000 U/L (ref 1–40)
ATMOSPHERIC PRESS: 742 MMHG
ATMOSPHERIC PRESS: 746.4 MMHG
ATMOSPHERIC PRESS: 748.7 MMHG
BASE EXCESS BLDA CALC-SCNC: 0.8 MMOL/L (ref 0–2)
BASE EXCESS BLDA CALC-SCNC: 1.9 MMOL/L (ref 0–2)
BASE EXCESS BLDA CALC-SCNC: 3.6 MMOL/L (ref 0–2)
BASOPHILS # BLD AUTO: 0.03 10*3/MM3 (ref 0–0.2)
BASOPHILS NFR BLD AUTO: 0.4 % (ref 0–1.5)
BDY SITE: ABNORMAL
BDY SITE: ABNORMAL
BDY SITE: NORMAL
BH BB BLOOD EXPIRATION DATE: NORMAL
BH BB BLOOD TYPE BARCODE: 5100
BH BB BLOOD TYPE BARCODE: 6200
BH BB BLOOD TYPE BARCODE: 6200
BH BB BLOOD TYPE BARCODE: 8400
BH BB DISPENSE STATUS: NORMAL
BH BB PRODUCT CODE: NORMAL
BH BB UNIT NUMBER: NORMAL
BH CV ECHO MEAS - BSA(HAYCOCK): 2.4 M^2
BH CV ECHO MEAS - BSA: 2.2 M^2
BH CV ECHO MEAS - BZI_BMI: 36 KILOGRAMS/M^2
BH CV ECHO MEAS - BZI_METRIC_HEIGHT: 175.3 CM
BH CV ECHO MEAS - BZI_METRIC_WEIGHT: 110.7 KG
BH CV ECHO MEAS - EDV(CUBED): 141.7 ML
BH CV ECHO MEAS - EDV(TEICH): 130.3 ML
BH CV ECHO MEAS - EF(CUBED): 13.9 %
BH CV ECHO MEAS - EF(TEICH): 10.9 %
BH CV ECHO MEAS - ESV(CUBED): 122 ML
BH CV ECHO MEAS - ESV(TEICH): 116 ML
BH CV ECHO MEAS - FS: 4.9 %
BH CV ECHO MEAS - IVS/LVPW: 0.84
BH CV ECHO MEAS - IVSD: 1 CM
BH CV ECHO MEAS - LV MASS(C)D: 229.7 GRAMS
BH CV ECHO MEAS - LV MASS(C)DI: 102.2 GRAMS/M^2
BH CV ECHO MEAS - LVIDD: 5.2 CM
BH CV ECHO MEAS - LVIDS: 5 CM
BH CV ECHO MEAS - LVPWD: 1.2 CM
BH CV ECHO MEAS - SI(CUBED): 8.8 ML/M^2
BH CV ECHO MEAS - SI(TEICH): 6.3 ML/M^2
BH CV ECHO MEAS - SV(CUBED): 19.7 ML
BH CV ECHO MEAS - SV(TEICH): 14.2 ML
BH CV UPPER VENOUS LEFT AXILLARY AUGMENT: NORMAL
BH CV UPPER VENOUS LEFT AXILLARY COMPETENT: NORMAL
BH CV UPPER VENOUS LEFT AXILLARY COMPRESS: NORMAL
BH CV UPPER VENOUS LEFT AXILLARY PHASIC: NORMAL
BH CV UPPER VENOUS LEFT AXILLARY SPONT: NORMAL
BH CV UPPER VENOUS LEFT BASILIC FOREARM COMPRESS: NORMAL
BH CV UPPER VENOUS LEFT BASILIC UPPER COMPRESS: NORMAL
BH CV UPPER VENOUS LEFT BRACHIAL COMPRESS: NORMAL
BH CV UPPER VENOUS LEFT CEPHALIC FOREARM COLOR: 1
BH CV UPPER VENOUS LEFT CEPHALIC FOREARM COMPRESS: NORMAL
BH CV UPPER VENOUS LEFT CEPHALIC FOREARM THROMBUS: NORMAL
BH CV UPPER VENOUS LEFT CEPHALIC UPPER COMPRESS: NORMAL
BH CV UPPER VENOUS LEFT INTERNAL JUGULAR AUGMENT: NORMAL
BH CV UPPER VENOUS LEFT INTERNAL JUGULAR COMPETENT: NORMAL
BH CV UPPER VENOUS LEFT INTERNAL JUGULAR COMPRESS: NORMAL
BH CV UPPER VENOUS LEFT INTERNAL JUGULAR PHASIC: NORMAL
BH CV UPPER VENOUS LEFT INTERNAL JUGULAR SPONT: NORMAL
BH CV UPPER VENOUS LEFT RADIAL COMPRESS: NORMAL
BH CV UPPER VENOUS LEFT SUBCLAVIAN AUGMENT: NORMAL
BH CV UPPER VENOUS LEFT SUBCLAVIAN COMPETENT: NORMAL
BH CV UPPER VENOUS LEFT SUBCLAVIAN COMPRESS: NORMAL
BH CV UPPER VENOUS LEFT SUBCLAVIAN PHASIC: NORMAL
BH CV UPPER VENOUS LEFT SUBCLAVIAN SPONT: NORMAL
BH CV UPPER VENOUS LEFT ULNAR COMPRESS: NORMAL
BH CV UPPER VENOUS RIGHT INTERNAL JUGULAR AUGMENT: NORMAL
BH CV UPPER VENOUS RIGHT INTERNAL JUGULAR COLOR: 1
BH CV UPPER VENOUS RIGHT INTERNAL JUGULAR COMPRESS: NORMAL
BH CV UPPER VENOUS RIGHT INTERNAL JUGULAR PHASIC: NORMAL
BH CV UPPER VENOUS RIGHT INTERNAL JUGULAR SPONT: NORMAL
BH CV UPPER VENOUS RIGHT INTERNAL JUGULAR THROMBUS: NORMAL
BH CV UPPER VENOUS RIGHT SUBCLAVIAN AUGMENT: NORMAL
BH CV UPPER VENOUS RIGHT SUBCLAVIAN COMPETENT: NORMAL
BH CV UPPER VENOUS RIGHT SUBCLAVIAN COMPRESS: NORMAL
BH CV UPPER VENOUS RIGHT SUBCLAVIAN PHASIC: NORMAL
BH CV UPPER VENOUS RIGHT SUBCLAVIAN SPONT: NORMAL
BILIRUB SERPL-MCNC: 3.9 MG/DL (ref 0–1.2)
BUN SERPL-MCNC: 40 MG/DL (ref 6–20)
BUN/CREAT SERPL: 18.6 (ref 7–25)
CA-I BLD-MCNC: 4 MG/DL (ref 4.6–5.4)
CA-I BLD-MCNC: 4.1 MG/DL (ref 4.6–5.4)
CA-I BLD-MCNC: 4.2 MG/DL (ref 4.6–5.4)
CA-I SERPL ISE-MCNC: 1.01 MMOL/L (ref 1.15–1.35)
CA-I SERPL ISE-MCNC: 1.02 MMOL/L (ref 1.15–1.35)
CA-I SERPL ISE-MCNC: 1.05 MMOL/L (ref 1.15–1.35)
CALCIUM SPEC-SCNC: 7.2 MG/DL (ref 8.6–10.5)
CHLORIDE SERPL-SCNC: 100 MMOL/L (ref 98–107)
CK SERPL-CCNC: 339 U/L (ref 20–200)
CK SERPL-CCNC: 347 U/L (ref 20–200)
CO2 SERPL-SCNC: 24.6 MMOL/L (ref 22–29)
CREAT SERPL-MCNC: 1.86 MG/DL (ref 0.76–1.27)
CREAT SERPL-MCNC: 2.06 MG/DL (ref 0.76–1.27)
CREAT SERPL-MCNC: 2.12 MG/DL (ref 0.76–1.27)
CREAT SERPL-MCNC: 2.15 MG/DL (ref 0.76–1.27)
D-LACTATE SERPL-SCNC: 1.6 MMOL/L (ref 0.5–2)
D-LACTATE SERPL-SCNC: 1.8 MMOL/L (ref 0.5–2)
D-LACTATE SERPL-SCNC: 2.5 MMOL/L (ref 0.5–2)
D-LACTATE SERPL-SCNC: 3 MMOL/L (ref 0.5–2)
DEPRECATED RDW RBC AUTO: 50.2 FL (ref 37–54)
DEPRECATED RDW RBC AUTO: 54.5 FL (ref 37–54)
EOSINOPHIL # BLD AUTO: 0.02 10*3/MM3 (ref 0–0.4)
EOSINOPHIL NFR BLD AUTO: 0.3 % (ref 0.3–6.2)
ERYTHROCYTE [DISTWIDTH] IN BLOOD BY AUTOMATED COUNT: 14.8 % (ref 12.3–15.4)
ERYTHROCYTE [DISTWIDTH] IN BLOOD BY AUTOMATED COUNT: 15.5 % (ref 12.3–15.4)
FIBRINOGEN PPP-MCNC: 133 MG/DL (ref 219–464)
FIBRINOGEN PPP-MCNC: 252 MG/DL (ref 219–464)
GFR SERPL CREATININE-BSD FRML MDRD: 32 ML/MIN/1.73
GFR SERPL CREATININE-BSD FRML MDRD: 32 ML/MIN/1.73
GFR SERPL CREATININE-BSD FRML MDRD: 33 ML/MIN/1.73
GFR SERPL CREATININE-BSD FRML MDRD: 38 ML/MIN/1.73
GLOBULIN UR ELPH-MCNC: 1.4 GM/DL
GLUCOSE BLDC GLUCOMTR-MCNC: 85 MG/DL (ref 70–130)
GLUCOSE BLDC GLUCOMTR-MCNC: 85 MG/DL (ref 70–130)
GLUCOSE BLDC GLUCOMTR-MCNC: 87 MG/DL (ref 70–130)
GLUCOSE SERPL-MCNC: 102 MG/DL (ref 65–99)
HCO3 BLDA-SCNC: 23.1 MMOL/L (ref 22–28)
HCO3 BLDA-SCNC: 24.5 MMOL/L (ref 22–28)
HCO3 BLDA-SCNC: 24.7 MMOL/L (ref 22–28)
HCT VFR BLD AUTO: 37.3 % (ref 37.5–51)
HCT VFR BLD AUTO: 39.5 % (ref 37.5–51)
HGB BLD-MCNC: 12.6 G/DL (ref 13–17.7)
HGB BLD-MCNC: 12.7 G/DL (ref 13–17.7)
INHALED O2 CONCENTRATION: 30 %
INHALED O2 CONCENTRATION: 30 %
INHALED O2 CONCENTRATION: 40 %
INR PPP: 2.04 (ref 0.9–1.1)
INR PPP: 2.52 (ref 0.9–1.1)
LYMPHOCYTES # BLD AUTO: 1.18 10*3/MM3 (ref 0.7–3.1)
LYMPHOCYTES NFR BLD AUTO: 16.5 % (ref 19.6–45.3)
MAGNESIUM SERPL-MCNC: 2.1 MG/DL (ref 1.6–2.6)
MAXIMAL PREDICTED HEART RATE: 163 BPM
MCH RBC QN AUTO: 30.8 PG (ref 26.6–33)
MCH RBC QN AUTO: 31.5 PG (ref 26.6–33)
MCHC RBC AUTO-ENTMCNC: 32.2 G/DL (ref 31.5–35.7)
MCHC RBC AUTO-ENTMCNC: 33.8 G/DL (ref 31.5–35.7)
MCV RBC AUTO: 93.3 FL (ref 79–97)
MCV RBC AUTO: 95.9 FL (ref 79–97)
MODALITY: ABNORMAL
MODALITY: ABNORMAL
MODALITY: NORMAL
MONOCYTES # BLD AUTO: 0.34 10*3/MM3 (ref 0.1–0.9)
MONOCYTES NFR BLD AUTO: 4.8 % (ref 5–12)
NEUTROPHILS NFR BLD AUTO: 5.52 10*3/MM3 (ref 1.7–7)
NEUTROPHILS NFR BLD AUTO: 77.2 % (ref 42.7–76)
NORMAL PLASMA PT: 12.9 SECONDS (ref 11.7–14.2)
O2 A-A PPRESDIFF RESPIRATORY: 0.5 MMHG
O2 A-A PPRESDIFF RESPIRATORY: 0.5 MMHG
O2 A-A PPRESDIFF RESPIRATORY: 0.7 MMHG
PCO2 BLDA: 25.3 MM HG (ref 35–45)
PCO2 BLDA: 26.5 MM HG (ref 35–45)
PCO2 BLDA: 35.3 MM HG (ref 35–45)
PEEP RESPIRATORY: 10 CM[H2O]
PH BLDA: 7.45 PH UNITS (ref 7.35–7.45)
PH BLDA: 7.57 PH UNITS (ref 7.35–7.45)
PH BLDA: 7.58 PH UNITS (ref 7.35–7.45)
PLATELET # BLD AUTO: 44 10*3/MM3 (ref 140–450)
PLATELET # BLD AUTO: 79 10*3/MM3 (ref 140–450)
PMV BLD AUTO: 11.7 FL (ref 6–12)
PMV BLD AUTO: 12.8 FL (ref 6–12)
PO2 BLDA: 132.5 MM HG (ref 80–100)
PO2 BLDA: 144.1 MM HG (ref 80–100)
PO2 BLDA: 95.4 MM HG (ref 80–100)
POTASSIUM SERPL-SCNC: 3 MMOL/L (ref 3.5–5.2)
POTASSIUM SERPL-SCNC: 3.6 MMOL/L (ref 3.5–5.2)
PROT SERPL-MCNC: 4.1 G/DL (ref 6–8.5)
PROTHROMBIN TIME: 22.3 SECONDS (ref 11.7–14.2)
PROTHROMBIN TIME: 22.8 SECONDS (ref 11.7–14.2)
PROTHROMBIN TIME: 26.9 SECONDS (ref 11.7–14.2)
PT MIX W PLASMA: 14.9 SECONDS (ref 11.7–14.2)
QT INTERVAL: 437 MS
RBC # BLD AUTO: 4 10*6/MM3 (ref 4.14–5.8)
RBC # BLD AUTO: 4.12 10*6/MM3 (ref 4.14–5.8)
SAO2 % BLDCOA: 97.8 % (ref 92–99)
SAO2 % BLDCOA: 99.5 % (ref 92–99)
SAO2 % BLDCOA: 99.6 % (ref 92–99)
SET MECH RESP RATE: 10
SET MECH RESP RATE: 20
SET MECH RESP RATE: 20
SODIUM SERPL-SCNC: 138 MMOL/L (ref 136–145)
STRESS TARGET HR: 139 BPM
THROMBIN TIME: 85.8 SECONDS (ref 15.7–20.4)
TOTAL RATE: 14 BREATHS/MINUTE
TOTAL RATE: 20 BREATHS/MINUTE
TOTAL RATE: 20 BREATHS/MINUTE
UNIT  ABO: NORMAL
UNIT  RH: NORMAL
VENTILATOR MODE: ABNORMAL
VENTILATOR MODE: ABNORMAL
VENTILATOR MODE: NORMAL
VT ON VENT VENT: 622 ML
VT ON VENT VENT: 668 ML
WBC # BLD AUTO: 7.15 10*3/MM3 (ref 3.4–10.8)
WBC # BLD AUTO: 9.55 10*3/MM3 (ref 3.4–10.8)

## 2021-10-31 PROCEDURE — 85611 PROTHROMBIN TEST: CPT | Performed by: INTERNAL MEDICINE

## 2021-10-31 PROCEDURE — 93971 EXTREMITY STUDY: CPT

## 2021-10-31 PROCEDURE — 25010000002 FENTANYL CITRATE (PF) 50 MCG/ML SOLUTION: Performed by: INTERNAL MEDICINE

## 2021-10-31 PROCEDURE — 94799 UNLISTED PULMONARY SVC/PX: CPT

## 2021-10-31 PROCEDURE — 85384 FIBRINOGEN ACTIVITY: CPT | Performed by: INTERNAL MEDICINE

## 2021-10-31 PROCEDURE — 71045 X-RAY EXAM CHEST 1 VIEW: CPT

## 2021-10-31 PROCEDURE — 93308 TTE F-UP OR LMTD: CPT | Performed by: INTERNAL MEDICINE

## 2021-10-31 PROCEDURE — 86022 PLATELET ANTIBODIES: CPT | Performed by: INTERNAL MEDICINE

## 2021-10-31 PROCEDURE — 25010000002 PIPERACILLIN SOD-TAZOBACTAM PER 1 G: Performed by: INTERNAL MEDICINE

## 2021-10-31 PROCEDURE — 94760 N-INVAS EAR/PLS OXIMETRY 1: CPT

## 2021-10-31 PROCEDURE — P9035 PLATELET PHERES LEUKOREDUCED: HCPCS

## 2021-10-31 PROCEDURE — 94003 VENT MGMT INPAT SUBQ DAY: CPT

## 2021-10-31 PROCEDURE — 0 POTASSIUM CHLORIDE 10 MEQ/100ML SOLUTION: Performed by: INTERNAL MEDICINE

## 2021-10-31 PROCEDURE — 25010000002 PROPOFOL 10 MG/ML EMULSION: Performed by: INTERNAL MEDICINE

## 2021-10-31 PROCEDURE — 82550 ASSAY OF CK (CPK): CPT | Performed by: INTERNAL MEDICINE

## 2021-10-31 PROCEDURE — 85730 THROMBOPLASTIN TIME PARTIAL: CPT | Performed by: INTERNAL MEDICINE

## 2021-10-31 PROCEDURE — 85025 COMPLETE CBC W/AUTO DIFF WBC: CPT | Performed by: INTERNAL MEDICINE

## 2021-10-31 PROCEDURE — 36430 TRANSFUSION BLD/BLD COMPNT: CPT

## 2021-10-31 PROCEDURE — 25010000002 HEPARIN (PORCINE) PER 1000 UNITS: Performed by: INTERNAL MEDICINE

## 2021-10-31 PROCEDURE — 99223 1ST HOSP IP/OBS HIGH 75: CPT | Performed by: INTERNAL MEDICINE

## 2021-10-31 PROCEDURE — 93005 ELECTROCARDIOGRAM TRACING: CPT | Performed by: INTERNAL MEDICINE

## 2021-10-31 PROCEDURE — 25010000002 AMIODARONE IN DEXTROSE 5% 360-4.14 MG/200ML-% SOLUTION: Performed by: INTERNAL MEDICINE

## 2021-10-31 PROCEDURE — 84132 ASSAY OF SERUM POTASSIUM: CPT | Performed by: INTERNAL MEDICINE

## 2021-10-31 PROCEDURE — 85610 PROTHROMBIN TIME: CPT | Performed by: INTERNAL MEDICINE

## 2021-10-31 PROCEDURE — 82565 ASSAY OF CREATININE: CPT | Performed by: INTERNAL MEDICINE

## 2021-10-31 PROCEDURE — 82803 BLOOD GASES ANY COMBINATION: CPT

## 2021-10-31 PROCEDURE — 82330 ASSAY OF CALCIUM: CPT | Performed by: INTERNAL MEDICINE

## 2021-10-31 PROCEDURE — 80053 COMPREHEN METABOLIC PANEL: CPT | Performed by: INTERNAL MEDICINE

## 2021-10-31 PROCEDURE — 93308 TTE F-UP OR LMTD: CPT

## 2021-10-31 PROCEDURE — P9012 CRYOPRECIPITATE EACH UNIT: HCPCS

## 2021-10-31 PROCEDURE — 99233 SBSQ HOSP IP/OBS HIGH 50: CPT | Performed by: INTERNAL MEDICINE

## 2021-10-31 PROCEDURE — 93325 DOPPLER ECHO COLOR FLOW MAPG: CPT

## 2021-10-31 PROCEDURE — 85027 COMPLETE CBC AUTOMATED: CPT | Performed by: INTERNAL MEDICINE

## 2021-10-31 PROCEDURE — P9100 PATHOGEN TEST FOR PLATELETS: HCPCS

## 2021-10-31 PROCEDURE — 83605 ASSAY OF LACTIC ACID: CPT | Performed by: INTERNAL MEDICINE

## 2021-10-31 PROCEDURE — 93325 DOPPLER ECHO COLOR FLOW MAPG: CPT | Performed by: INTERNAL MEDICINE

## 2021-10-31 PROCEDURE — 25010000002 AMIODARONE IN DEXTROSE 5% 150-4.21 MG/100ML-% SOLUTION: Performed by: INTERNAL MEDICINE

## 2021-10-31 PROCEDURE — 82962 GLUCOSE BLOOD TEST: CPT

## 2021-10-31 PROCEDURE — 83735 ASSAY OF MAGNESIUM: CPT | Performed by: INTERNAL MEDICINE

## 2021-10-31 PROCEDURE — 86927 PLASMA FRESH FROZEN: CPT

## 2021-10-31 PROCEDURE — 0 POTASSIUM CHLORIDE PER 2 MEQ: Performed by: INTERNAL MEDICINE

## 2021-10-31 PROCEDURE — 93010 ELECTROCARDIOGRAM REPORT: CPT | Performed by: INTERNAL MEDICINE

## 2021-10-31 RX ORDER — POTASSIUM CHLORIDE 29.8 MG/ML
20 INJECTION INTRAVENOUS ONCE
Status: COMPLETED | OUTPATIENT
Start: 2021-10-31 | End: 2021-10-31

## 2021-10-31 RX ORDER — POTASSIUM CHLORIDE 1.5 G/1.77G
40 POWDER, FOR SOLUTION ORAL AS NEEDED
Status: DISCONTINUED | OUTPATIENT
Start: 2021-10-31 | End: 2021-11-08 | Stop reason: HOSPADM

## 2021-10-31 RX ORDER — POTASSIUM CHLORIDE 7.45 MG/ML
10 INJECTION INTRAVENOUS
Status: DISCONTINUED | OUTPATIENT
Start: 2021-10-31 | End: 2021-11-08

## 2021-10-31 RX ORDER — POTASSIUM CHLORIDE 750 MG/1
40 TABLET, FILM COATED, EXTENDED RELEASE ORAL AS NEEDED
Status: DISCONTINUED | OUTPATIENT
Start: 2021-10-31 | End: 2021-11-08 | Stop reason: HOSPADM

## 2021-10-31 RX ADMIN — POTASSIUM CHLORIDE 20 MEQ: 29.8 INJECTION, SOLUTION INTRAVENOUS at 07:37

## 2021-10-31 RX ADMIN — POTASSIUM CHLORIDE 10 MEQ: 7.46 INJECTION, SOLUTION INTRAVENOUS at 18:13

## 2021-10-31 RX ADMIN — Medication 150 MEQ: at 00:00

## 2021-10-31 RX ADMIN — PROPOFOL 30 MCG/KG/MIN: 10 INJECTION, EMULSION INTRAVENOUS at 14:44

## 2021-10-31 RX ADMIN — TAZOBACTAM SODIUM AND PIPERACILLIN SODIUM 3.38 G: 375; 3 INJECTION, SOLUTION INTRAVENOUS at 04:00

## 2021-10-31 RX ADMIN — AMIODARONE HYDROCHLORIDE 0.5 MG/MIN: 1.8 INJECTION, SOLUTION INTRAVENOUS at 07:00

## 2021-10-31 RX ADMIN — TAZOBACTAM SODIUM AND PIPERACILLIN SODIUM 3.38 G: 375; 3 INJECTION, SOLUTION INTRAVENOUS at 21:34

## 2021-10-31 RX ADMIN — SODIUM CHLORIDE, PRESERVATIVE FREE 10 ML: 5 INJECTION INTRAVENOUS at 08:00

## 2021-10-31 RX ADMIN — POTASSIUM CHLORIDE 20 MEQ: 400 INJECTION, SOLUTION INTRAVENOUS at 06:32

## 2021-10-31 RX ADMIN — SODIUM CHLORIDE, PRESERVATIVE FREE 10 ML: 5 INJECTION INTRAVENOUS at 21:35

## 2021-10-31 RX ADMIN — HEPARIN SODIUM 14 ML/HR: 5000 INJECTION INTRAVENOUS; SUBCUTANEOUS at 12:56

## 2021-10-31 RX ADMIN — PROPOFOL 30 MCG/KG/MIN: 10 INJECTION, EMULSION INTRAVENOUS at 20:59

## 2021-10-31 RX ADMIN — TAZOBACTAM SODIUM AND PIPERACILLIN SODIUM 3.38 G: 375; 3 INJECTION, SOLUTION INTRAVENOUS at 12:35

## 2021-10-31 RX ADMIN — AMIODARONE HYDROCHLORIDE 1 MG/MIN: 1.8 INJECTION, SOLUTION INTRAVENOUS at 01:30

## 2021-10-31 RX ADMIN — POTASSIUM CHLORIDE 10 MEQ: 7.46 INJECTION, SOLUTION INTRAVENOUS at 16:58

## 2021-10-31 RX ADMIN — PANTOPRAZOLE SODIUM 40 MG: 40 INJECTION, POWDER, FOR SOLUTION INTRAVENOUS at 08:00

## 2021-10-31 RX ADMIN — PROPOFOL 20 MCG/KG/MIN: 10 INJECTION, EMULSION INTRAVENOUS at 04:00

## 2021-10-31 RX ADMIN — FENTANYL CITRATE 50 MCG: 50 INJECTION INTRAMUSCULAR; INTRAVENOUS at 21:35

## 2021-10-31 RX ADMIN — AMIODARONE HYDROCHLORIDE 150 MG: 1.5 INJECTION, SOLUTION INTRAVENOUS at 01:30

## 2021-10-31 RX ADMIN — PROPOFOL 25 MCG/KG/MIN: 10 INJECTION, EMULSION INTRAVENOUS at 10:58

## 2021-11-01 ENCOUNTER — APPOINTMENT (OUTPATIENT)
Dept: CARDIOLOGY | Facility: HOSPITAL | Age: 56
End: 2021-11-01

## 2021-11-01 LAB
ALBUMIN SERPL-MCNC: 2.6 G/DL (ref 3.5–5.2)
ALBUMIN/GLOB SERPL: 1.4 G/DL
ALP SERPL-CCNC: 54 U/L (ref 39–117)
ALT SERPL W P-5'-P-CCNC: 3451 U/L (ref 1–41)
ANION GAP SERPL CALCULATED.3IONS-SCNC: 9.5 MMOL/L (ref 5–15)
APTT PPP: 36.9 SECONDS (ref 22.7–35.4)
APTT PPP: 53.4 SECONDS (ref 22.7–35.4)
ARTERIAL PATENCY WRIST A: ABNORMAL
AST SERPL-CCNC: 2182 U/L (ref 1–40)
ATMOSPHERIC PRESS: 753.2 MMHG
ATMOSPHERIC PRESS: 755.8 MMHG
ATMOSPHERIC PRESS: 756.4 MMHG
BACTERIA SPEC RESP CULT: NORMAL
BASE EXCESS BLDA CALC-SCNC: 2.3 MMOL/L (ref 0–2)
BASE EXCESS BLDA CALC-SCNC: 2.4 MMOL/L (ref 0–2)
BASE EXCESS BLDA CALC-SCNC: 3 MMOL/L (ref 0–2)
BASOPHILS # BLD AUTO: 0.02 10*3/MM3 (ref 0–0.2)
BASOPHILS # BLD AUTO: 0.03 10*3/MM3 (ref 0–0.2)
BASOPHILS NFR BLD AUTO: 0.2 % (ref 0–1.5)
BASOPHILS NFR BLD AUTO: 0.4 % (ref 0–1.5)
BDY SITE: ABNORMAL
BH BB BLOOD EXPIRATION DATE: NORMAL
BH BB BLOOD TYPE BARCODE: 5100
BH BB DISPENSE STATUS: NORMAL
BH BB PRODUCT CODE: NORMAL
BH BB UNIT NUMBER: NORMAL
BH CV ECHO MEAS - EDV(MOD-SP2): 137 ML
BH CV ECHO MEAS - EDV(MOD-SP4): 139 ML
BH CV ECHO MEAS - EF(MOD-BP): 31 %
BH CV ECHO MEAS - EF(MOD-SP2): 32.1 %
BH CV ECHO MEAS - EF(MOD-SP4): 29.5 %
BH CV ECHO MEAS - ESV(MOD-SP2): 93 ML
BH CV ECHO MEAS - ESV(MOD-SP4): 98 ML
BH CV ECHO MEAS - LVLD AP2: 8.1 CM
BH CV ECHO MEAS - LVLD AP4: 8.5 CM
BH CV ECHO MEAS - LVLS AP2: 7.6 CM
BH CV ECHO MEAS - LVLS AP4: 7.5 CM
BH CV ECHO MEAS - SV(MOD-SP2): 44 ML
BH CV ECHO MEAS - SV(MOD-SP4): 41 ML
BILIRUB SERPL-MCNC: 3.4 MG/DL (ref 0–1.2)
BUN SERPL-MCNC: 37 MG/DL (ref 6–20)
BUN/CREAT SERPL: 20 (ref 7–25)
CA-I BLD-MCNC: 4.3 MG/DL (ref 4.6–5.4)
CA-I BLD-MCNC: 4.4 MG/DL (ref 4.6–5.4)
CA-I SERPL ISE-MCNC: 1.07 MMOL/L (ref 1.15–1.35)
CA-I SERPL ISE-MCNC: 1.1 MMOL/L (ref 1.15–1.35)
CALCIUM SPEC-SCNC: 7.4 MG/DL (ref 8.6–10.5)
CHLORIDE SERPL-SCNC: 103 MMOL/L (ref 98–107)
CO2 SERPL-SCNC: 26.5 MMOL/L (ref 22–29)
CREAT SERPL-MCNC: 1.85 MG/DL (ref 0.76–1.27)
CREAT SERPL-MCNC: 1.85 MG/DL (ref 0.76–1.27)
CREAT SERPL-MCNC: 1.89 MG/DL (ref 0.76–1.27)
D-LACTATE SERPL-SCNC: 1.3 MMOL/L (ref 0.5–2)
D-LACTATE SERPL-SCNC: 1.4 MMOL/L (ref 0.5–2)
D-LACTATE SERPL-SCNC: 1.4 MMOL/L (ref 0.5–2)
D-LACTATE SERPL-SCNC: 1.5 MMOL/L (ref 0.5–2)
DEPRECATED RDW RBC AUTO: 47.8 FL (ref 37–54)
DEPRECATED RDW RBC AUTO: 50.6 FL (ref 37–54)
EOSINOPHIL # BLD AUTO: 0.01 10*3/MM3 (ref 0–0.4)
EOSINOPHIL # BLD AUTO: 0.07 10*3/MM3 (ref 0–0.4)
EOSINOPHIL NFR BLD AUTO: 0.1 % (ref 0.3–6.2)
EOSINOPHIL NFR BLD AUTO: 0.9 % (ref 0.3–6.2)
ERYTHROCYTE [DISTWIDTH] IN BLOOD BY AUTOMATED COUNT: 14.6 % (ref 12.3–15.4)
ERYTHROCYTE [DISTWIDTH] IN BLOOD BY AUTOMATED COUNT: 14.8 % (ref 12.3–15.4)
FIBRINOGEN PPP-MCNC: 255 MG/DL (ref 219–464)
FIBRINOGEN PPP-MCNC: 326 MG/DL (ref 219–464)
GFR SERPL CREATININE-BSD FRML MDRD: 37 ML/MIN/1.73
GFR SERPL CREATININE-BSD FRML MDRD: 38 ML/MIN/1.73
GFR SERPL CREATININE-BSD FRML MDRD: 38 ML/MIN/1.73
GLOBULIN UR ELPH-MCNC: 1.8 GM/DL
GLUCOSE BLDC GLUCOMTR-MCNC: 111 MG/DL (ref 70–130)
GLUCOSE SERPL-MCNC: 94 MG/DL (ref 65–99)
GRAM STN SPEC: NORMAL
GRAM STN SPEC: NORMAL
HCO3 BLDA-SCNC: 26.3 MMOL/L (ref 22–28)
HCO3 BLDA-SCNC: 27.2 MMOL/L (ref 22–28)
HCO3 BLDA-SCNC: 27.2 MMOL/L (ref 22–28)
HCT VFR BLD AUTO: 35.5 % (ref 37.5–51)
HCT VFR BLD AUTO: 41.9 % (ref 37.5–51)
HCT VFR BLDA CALC: 51 % (ref 38–51)
HCT VFR BLDA CALC: 51 % (ref 38–51)
HGB BLD-MCNC: 12.2 G/DL (ref 13–17.7)
HGB BLD-MCNC: 13.9 G/DL (ref 13–17.7)
HGB BLDA-MCNC: 17.3 G/DL (ref 12–17)
HGB BLDA-MCNC: 17.3 G/DL (ref 12–17)
IMM GRANULOCYTES # BLD AUTO: 0.06 10*3/MM3 (ref 0–0.05)
IMM GRANULOCYTES # BLD AUTO: 0.06 10*3/MM3 (ref 0–0.05)
IMM GRANULOCYTES NFR BLD AUTO: 0.7 % (ref 0–0.5)
IMM GRANULOCYTES NFR BLD AUTO: 0.8 % (ref 0–0.5)
INHALED O2 CONCENTRATION: 30 %
INR PPP: 1.32 (ref 0.9–1.1)
INR PPP: 1.65 (ref 0.9–1.1)
LYMPHOCYTES # BLD AUTO: 0.27 10*3/MM3 (ref 0.7–3.1)
LYMPHOCYTES # BLD AUTO: 0.83 10*3/MM3 (ref 0.7–3.1)
LYMPHOCYTES NFR BLD AUTO: 11 % (ref 19.6–45.3)
LYMPHOCYTES NFR BLD AUTO: 3 % (ref 19.6–45.3)
MAXIMAL PREDICTED HEART RATE: 163 BPM
MCH RBC QN AUTO: 30.9 PG (ref 26.6–33)
MCH RBC QN AUTO: 31.3 PG (ref 26.6–33)
MCHC RBC AUTO-ENTMCNC: 33.2 G/DL (ref 31.5–35.7)
MCHC RBC AUTO-ENTMCNC: 34.4 G/DL (ref 31.5–35.7)
MCV RBC AUTO: 91 FL (ref 79–97)
MCV RBC AUTO: 93.1 FL (ref 79–97)
MODALITY: ABNORMAL
MONOCYTES # BLD AUTO: 0.46 10*3/MM3 (ref 0.1–0.9)
MONOCYTES # BLD AUTO: 0.47 10*3/MM3 (ref 0.1–0.9)
MONOCYTES NFR BLD AUTO: 5.1 % (ref 5–12)
MONOCYTES NFR BLD AUTO: 6.3 % (ref 5–12)
NEUTROPHILS NFR BLD AUTO: 6.06 10*3/MM3 (ref 1.7–7)
NEUTROPHILS NFR BLD AUTO: 8.15 10*3/MM3 (ref 1.7–7)
NEUTROPHILS NFR BLD AUTO: 80.6 % (ref 42.7–76)
NEUTROPHILS NFR BLD AUTO: 90.9 % (ref 42.7–76)
NRBC BLD AUTO-RTO: 0.3 /100 WBC (ref 0–0.2)
NRBC BLD AUTO-RTO: 1.1 /100 WBC (ref 0–0.2)
O2 A-A PPRESDIFF RESPIRATORY: 0.6 MMHG
O2 A-A PPRESDIFF RESPIRATORY: 0.7 MMHG
O2 A-A PPRESDIFF RESPIRATORY: 0.7 MMHG
PCO2 BLDA: 37 MM HG (ref 35–45)
PCO2 BLDA: 39.3 MM HG (ref 35–45)
PCO2 BLDA: 42.4 MM HG (ref 35–45)
PEEP RESPIRATORY: 10 CM[H2O]
PH BLDA: 7.42 PH UNITS (ref 7.35–7.45)
PH BLDA: 7.45 PH UNITS (ref 7.35–7.45)
PH BLDA: 7.46 PH UNITS (ref 7.35–7.45)
PLATELET # BLD AUTO: 64 10*3/MM3 (ref 140–450)
PLATELET # BLD AUTO: 66 10*3/MM3 (ref 140–450)
PMV BLD AUTO: 12.1 FL (ref 6–12)
PMV BLD AUTO: 12.1 FL (ref 6–12)
PO2 BLDA: 103.6 MM HG (ref 80–100)
PO2 BLDA: 123.3 MM HG (ref 80–100)
PO2 BLDA: 127.3 MM HG (ref 80–100)
POTASSIUM SERPL-SCNC: 3.4 MMOL/L (ref 3.5–5.2)
POTASSIUM SERPL-SCNC: 3.6 MMOL/L (ref 3.5–5.2)
PROT SERPL-MCNC: 4.4 G/DL (ref 6–8.5)
PROTHROMBIN TIME: 16.1 SECONDS (ref 11.7–14.2)
PROTHROMBIN TIME: 19.3 SECONDS (ref 11.7–14.2)
QT INTERVAL: 401 MS
RBC # BLD AUTO: 3.9 10*6/MM3 (ref 4.14–5.8)
RBC # BLD AUTO: 4.5 10*6/MM3 (ref 4.14–5.8)
SAO2 % BLDA: 100 % (ref 95–98)
SAO2 % BLDA: 100 % (ref 95–98)
SAO2 % BLDCOA: 98 % (ref 92–99)
SAO2 % BLDCOA: 98.9 % (ref 92–99)
SAO2 % BLDCOA: 99.1 % (ref 92–99)
SET MECH RESP RATE: 10
SODIUM SERPL-SCNC: 139 MMOL/L (ref 136–145)
STRESS TARGET HR: 139 BPM
TOTAL RATE: 10 BREATHS/MINUTE
TOTAL RATE: 10 BREATHS/MINUTE
TOTAL RATE: 14 BREATHS/MINUTE
UNIT  ABO: NORMAL
UNIT  RH: NORMAL
VENTILATOR MODE: ABNORMAL
VT ON VENT VENT: 681 ML
WBC # BLD AUTO: 7.52 10*3/MM3 (ref 3.4–10.8)
WBC # BLD AUTO: 8.97 10*3/MM3 (ref 3.4–10.8)

## 2021-11-01 PROCEDURE — 85730 THROMBOPLASTIN TIME PARTIAL: CPT | Performed by: INTERNAL MEDICINE

## 2021-11-01 PROCEDURE — 99233 SBSQ HOSP IP/OBS HIGH 50: CPT | Performed by: INTERNAL MEDICINE

## 2021-11-01 PROCEDURE — 94003 VENT MGMT INPAT SUBQ DAY: CPT

## 2021-11-01 PROCEDURE — 85025 COMPLETE CBC W/AUTO DIFF WBC: CPT | Performed by: INTERNAL MEDICINE

## 2021-11-01 PROCEDURE — 94799 UNLISTED PULMONARY SVC/PX: CPT

## 2021-11-01 PROCEDURE — 85384 FIBRINOGEN ACTIVITY: CPT | Performed by: INTERNAL MEDICINE

## 2021-11-01 PROCEDURE — 94760 N-INVAS EAR/PLS OXIMETRY 1: CPT

## 2021-11-01 PROCEDURE — 84132 ASSAY OF SERUM POTASSIUM: CPT | Performed by: INTERNAL MEDICINE

## 2021-11-01 PROCEDURE — 25010000002 FUROSEMIDE PER 20 MG: Performed by: INTERNAL MEDICINE

## 2021-11-01 PROCEDURE — 99231 SBSQ HOSP IP/OBS SF/LOW 25: CPT | Performed by: INTERNAL MEDICINE

## 2021-11-01 PROCEDURE — 83605 ASSAY OF LACTIC ACID: CPT | Performed by: INTERNAL MEDICINE

## 2021-11-01 PROCEDURE — 85610 PROTHROMBIN TIME: CPT | Performed by: INTERNAL MEDICINE

## 2021-11-01 PROCEDURE — 94002 VENT MGMT INPAT INIT DAY: CPT

## 2021-11-01 PROCEDURE — 25010000002 PIPERACILLIN SOD-TAZOBACTAM PER 1 G: Performed by: INTERNAL MEDICINE

## 2021-11-01 PROCEDURE — 82962 GLUCOSE BLOOD TEST: CPT

## 2021-11-01 PROCEDURE — 25010000002 FENTANYL CITRATE (PF) 50 MCG/ML SOLUTION: Performed by: INTERNAL MEDICINE

## 2021-11-01 PROCEDURE — 25010000002 PROPOFOL 10 MG/ML EMULSION: Performed by: INTERNAL MEDICINE

## 2021-11-01 PROCEDURE — 25010000002 DIGOXIN PER 500 MCG: Performed by: INTERNAL MEDICINE

## 2021-11-01 PROCEDURE — 93308 TTE F-UP OR LMTD: CPT | Performed by: INTERNAL MEDICINE

## 2021-11-01 PROCEDURE — 93010 ELECTROCARDIOGRAM REPORT: CPT | Performed by: INTERNAL MEDICINE

## 2021-11-01 PROCEDURE — 82330 ASSAY OF CALCIUM: CPT | Performed by: INTERNAL MEDICINE

## 2021-11-01 PROCEDURE — 0 MILRINONE LACTATE IN DEXTROSE 20-5 MG/100ML-% SOLUTION: Performed by: INTERNAL MEDICINE

## 2021-11-01 PROCEDURE — 0 POTASSIUM CHLORIDE 10 MEQ/100ML SOLUTION: Performed by: INTERNAL MEDICINE

## 2021-11-01 PROCEDURE — 93308 TTE F-UP OR LMTD: CPT

## 2021-11-01 PROCEDURE — 82803 BLOOD GASES ANY COMBINATION: CPT

## 2021-11-01 PROCEDURE — 93005 ELECTROCARDIOGRAM TRACING: CPT | Performed by: INTERNAL MEDICINE

## 2021-11-01 PROCEDURE — P9047 ALBUMIN (HUMAN), 25%, 50ML: HCPCS | Performed by: INTERNAL MEDICINE

## 2021-11-01 PROCEDURE — 93325 DOPPLER ECHO COLOR FLOW MAPG: CPT

## 2021-11-01 PROCEDURE — 25010000002 ALBUMIN HUMAN 25% PER 50 ML: Performed by: INTERNAL MEDICINE

## 2021-11-01 PROCEDURE — 82565 ASSAY OF CREATININE: CPT | Performed by: INTERNAL MEDICINE

## 2021-11-01 PROCEDURE — 80053 COMPREHEN METABOLIC PANEL: CPT | Performed by: INTERNAL MEDICINE

## 2021-11-01 PROCEDURE — 93325 DOPPLER ECHO COLOR FLOW MAPG: CPT | Performed by: INTERNAL MEDICINE

## 2021-11-01 RX ORDER — FUROSEMIDE 10 MG/ML
80 INJECTION INTRAMUSCULAR; INTRAVENOUS ONCE
Status: COMPLETED | OUTPATIENT
Start: 2021-11-01 | End: 2021-11-01

## 2021-11-01 RX ORDER — CHLORHEXIDINE GLUCONATE 0.12 MG/ML
15 RINSE ORAL EVERY 12 HOURS SCHEDULED
Status: DISCONTINUED | OUTPATIENT
Start: 2021-11-01 | End: 2021-11-08 | Stop reason: HOSPADM

## 2021-11-01 RX ORDER — ALBUMIN (HUMAN) 12.5 G/50ML
12.5 SOLUTION INTRAVENOUS ONCE
Status: COMPLETED | OUTPATIENT
Start: 2021-11-01 | End: 2021-11-01

## 2021-11-01 RX ORDER — ALBUMIN (HUMAN) 12.5 G/50ML
12.5 SOLUTION INTRAVENOUS EVERY 12 HOURS
Status: DISCONTINUED | OUTPATIENT
Start: 2021-11-01 | End: 2021-11-02

## 2021-11-01 RX ORDER — MILRINONE LACTATE 0.2 MG/ML
.25-.75 INJECTION, SOLUTION INTRAVENOUS
Status: DISCONTINUED | OUTPATIENT
Start: 2021-11-01 | End: 2021-11-03

## 2021-11-01 RX ORDER — FUROSEMIDE 10 MG/ML
80 INJECTION INTRAMUSCULAR; INTRAVENOUS EVERY 12 HOURS
Status: DISCONTINUED | OUTPATIENT
Start: 2021-11-01 | End: 2021-11-02

## 2021-11-01 RX ORDER — DIGOXIN 0.25 MG/ML
500 INJECTION INTRAMUSCULAR; INTRAVENOUS ONCE
Status: COMPLETED | OUTPATIENT
Start: 2021-11-01 | End: 2021-11-01

## 2021-11-01 RX ORDER — SODIUM CHLORIDE 9 MG/ML
125 INJECTION, SOLUTION INTRAVENOUS CONTINUOUS
Status: DISCONTINUED | OUTPATIENT
Start: 2021-11-01 | End: 2021-11-02

## 2021-11-01 RX ORDER — DIGOXIN 0.25 MG/ML
250 INJECTION INTRAMUSCULAR; INTRAVENOUS EVERY 6 HOURS
Status: COMPLETED | OUTPATIENT
Start: 2021-11-01 | End: 2021-11-01

## 2021-11-01 RX ADMIN — SODIUM CHLORIDE 125 ML/HR: 9 INJECTION, SOLUTION INTRAVENOUS at 13:07

## 2021-11-01 RX ADMIN — CHLORHEXIDINE GLUCONATE 15 ML: 1.2 RINSE ORAL at 12:28

## 2021-11-01 RX ADMIN — POTASSIUM CHLORIDE 10 MEQ: 7.46 INJECTION, SOLUTION INTRAVENOUS at 08:07

## 2021-11-01 RX ADMIN — POTASSIUM CHLORIDE 40 MEQ: 1.5 POWDER, FOR SOLUTION ORAL at 22:24

## 2021-11-01 RX ADMIN — PROPOFOL 30 MCG/KG/MIN: 10 INJECTION, EMULSION INTRAVENOUS at 18:44

## 2021-11-01 RX ADMIN — TAZOBACTAM SODIUM AND PIPERACILLIN SODIUM 3.38 G: 375; 3 INJECTION, SOLUTION INTRAVENOUS at 12:28

## 2021-11-01 RX ADMIN — MILRINONE LACTATE IN DEXTROSE 0.25 MCG/KG/MIN: 200 INJECTION, SOLUTION INTRAVENOUS at 10:16

## 2021-11-01 RX ADMIN — PROPOFOL 30 MCG/KG/MIN: 10 INJECTION, EMULSION INTRAVENOUS at 07:57

## 2021-11-01 RX ADMIN — SODIUM CHLORIDE 500 ML: 9 INJECTION, SOLUTION INTRAVENOUS at 07:56

## 2021-11-01 RX ADMIN — FUROSEMIDE 80 MG: 10 INJECTION, SOLUTION INTRAMUSCULAR; INTRAVENOUS at 08:08

## 2021-11-01 RX ADMIN — SODIUM CHLORIDE, PRESERVATIVE FREE 10 ML: 5 INJECTION INTRAVENOUS at 08:08

## 2021-11-01 RX ADMIN — SODIUM CHLORIDE 125 ML/HR: 9 INJECTION, SOLUTION INTRAVENOUS at 16:37

## 2021-11-01 RX ADMIN — DIGOXIN 250 MCG: 0.25 INJECTION INTRAMUSCULAR; INTRAVENOUS at 21:05

## 2021-11-01 RX ADMIN — CHLORHEXIDINE GLUCONATE 15 ML: 1.2 RINSE ORAL at 21:17

## 2021-11-01 RX ADMIN — MILRINONE LACTATE IN DEXTROSE 0.25 MCG/KG/MIN: 200 INJECTION, SOLUTION INTRAVENOUS at 21:43

## 2021-11-01 RX ADMIN — ALBUMIN HUMAN 12.5 G: 0.25 SOLUTION INTRAVENOUS at 06:50

## 2021-11-01 RX ADMIN — FENTANYL CITRATE 50 MCG: 50 INJECTION INTRAMUSCULAR; INTRAVENOUS at 12:42

## 2021-11-01 RX ADMIN — PROPOFOL 30 MCG/KG/MIN: 10 INJECTION, EMULSION INTRAVENOUS at 03:51

## 2021-11-01 RX ADMIN — ALBUMIN HUMAN 12.5 G: 0.25 SOLUTION INTRAVENOUS at 18:31

## 2021-11-01 RX ADMIN — DIGOXIN 250 MCG: 0.25 INJECTION INTRAMUSCULAR; INTRAVENOUS at 14:16

## 2021-11-01 RX ADMIN — PROPOFOL 30 MCG/KG/MIN: 10 INJECTION, EMULSION INTRAVENOUS at 13:38

## 2021-11-01 RX ADMIN — TAZOBACTAM SODIUM AND PIPERACILLIN SODIUM 3.38 G: 375; 3 INJECTION, SOLUTION INTRAVENOUS at 21:05

## 2021-11-01 RX ADMIN — SODIUM CHLORIDE, PRESERVATIVE FREE 10 ML: 5 INJECTION INTRAVENOUS at 21:17

## 2021-11-01 RX ADMIN — PANTOPRAZOLE SODIUM 40 MG: 40 INJECTION, POWDER, FOR SOLUTION INTRAVENOUS at 08:08

## 2021-11-01 RX ADMIN — TAZOBACTAM SODIUM AND PIPERACILLIN SODIUM 3.38 G: 375; 3 INJECTION, SOLUTION INTRAVENOUS at 04:35

## 2021-11-01 RX ADMIN — FENTANYL CITRATE 50 MCG: 50 INJECTION INTRAMUSCULAR; INTRAVENOUS at 08:56

## 2021-11-01 RX ADMIN — POTASSIUM CHLORIDE 10 MEQ: 7.46 INJECTION, SOLUTION INTRAVENOUS at 06:51

## 2021-11-01 RX ADMIN — POTASSIUM CHLORIDE 10 MEQ: 7.46 INJECTION, SOLUTION INTRAVENOUS at 05:51

## 2021-11-01 RX ADMIN — DIGOXIN 500 MCG: 0.25 INJECTION INTRAMUSCULAR; INTRAVENOUS at 07:55

## 2021-11-01 RX ADMIN — POTASSIUM CHLORIDE 10 MEQ: 7.46 INJECTION, SOLUTION INTRAVENOUS at 10:17

## 2021-11-01 RX ADMIN — FUROSEMIDE 80 MG: 10 INJECTION, SOLUTION INTRAMUSCULAR; INTRAVENOUS at 22:24

## 2021-11-01 NOTE — PLAN OF CARE
Goal Outcome Evaluation:  Plan of Care Reviewed With: patient, sibling        Progress: no change  Outcome Summary: No significant change overnight. Remains on Impella pump (P3) with levophed at .02 to maintain MAP >65. Ventilator at 30% O2, maintains sats over 97%. Pulses palpable in all extremities and cap refill less than 3 seconds. UOP decreasing overnight, and has significan swelling, will continue to monitor. Pt will wake up and follow commands when sedation decreased. Family at bedside, plan is to discuss disposition/plan of care with family and MD today.

## 2021-11-01 NOTE — PROGRESS NOTES
[facility]  Inpatient Progress Note    Reason for Follow Up: Elevated LFTs    Subjective     Interval History:   Transaminase levels trending downward, still marked elevation.  Discussed with nutritionist at bedside.  Cortrack being placed.    Current Facility-Administered Medications:   •  atropine sulfate injection 0.5 mg, 0.5 mg, Intravenous, Q5 Min PRN, Get Cunha MD  •  fentaNYL citrate (PF) (SUBLIMAZE) injection 50 mcg, 50 mcg, Intravenous, Q30 Min PRN, Get Cunha MD, 50 mcg at 10/31/21 2135  •  heparin (porcine) 25,000 Units in dextrose (D5W) 5 % 500 mL infusion, 14 mL/hr, Intra-arterial, Continuous, Get Cunha MD, Last Rate: 14 mL/hr at 10/31/21 1256, 14 mL/hr at 10/31/21 1256  •  HYDROcodone-acetaminophen (NORCO) 5-325 MG per tablet 1 tablet, 1 tablet, Oral, Q4H PRN, Get Cunha MD  •  milrinone (PRIMACOR) 20 mg in 100 mL D5W infusion, 0.25-0.75 mcg/kg/min, Intravenous, Titrated, Get Cunha MD  •  morphine injection 1 mg, 1 mg, Intravenous, Q4H PRN **AND** naloxone (NARCAN) injection 0.4 mg, 0.4 mg, Intravenous, Q5 Min PRN, Gte Cunha MD  •  norepinephrine (LEVOPHED) 8 mg in 250 mL NS infusion (premix), 0.02-0.3 mcg/kg/min, Intravenous, Titrated, Get Cunha MD, Stopped at 11/01/21 0745  •  pantoprazole (PROTONIX) injection 40 mg, 40 mg, Intravenous, Daily, Get Cunha MD, 40 mg at 11/01/21 0808  •  phenylephrine (THONG-SYNEPHRINE) 50 mg in 250 mL NS infusion, 0.5-3 mcg/kg/min, Intravenous, Titrated, Get Cunha MD, Stopped at 10/30/21 0545  •  piperacillin-tazobactam (ZOSYN) 3.375 g in iso-osmotic dextrose 50 ml (premix), 3.375 g, Intravenous, Q8H, Get Cunha MD, 3.375 g at 11/01/21 0435  •  potassium chloride (K-DUR,KLOR-CON) ER tablet 40 mEq, 40 mEq, Oral, PRN **OR** potassium chloride (KLOR-CON) packet 40 mEq, 40 mEq, Oral, PRN **OR** potassium chloride 10 mEq in 100 mL IVPB, 10 mEq, Intravenous, Q1H PRN, Padmini Ma  MD ALAN, Last Rate: 100 mL/hr at 11/01/21 0807, 10 mEq at 11/01/21 0807  •  propofol (DIPRIVAN) infusion 10 mg/mL 100 mL, 5-50 mcg/kg/min, Intravenous, Titrated, Kurt Evangelista MD, Last Rate: 17.96 mL/hr at 11/01/21 0757, 30 mcg/kg/min at 11/01/21 0757  •  sodium chloride 0.9 % flush 10 mL, 10 mL, Intravenous, PRN, Get Cunha MD  •  [COMPLETED] Insert peripheral IV, , , Once **AND** sodium chloride 0.9 % flush 10 mL, 10 mL, Intravenous, PRN, Get Cunha MD  •  sodium chloride 0.9 % flush 10 mL, 10 mL, Intravenous, Q12H, Get Cunha MD, 10 mL at 11/01/21 0808  •  sodium chloride 0.9 % flush 10 mL, 10 mL, Intravenous, PRNAlphonse William, MD  •  sodium chloride 0.9 % infusion 250 mL, 250 mL, Intravenous, Once PRN, Get Cunha MD  Review of Systems:    Review of systems could not be obtained due to  patient intubated.    Objective     Vital Signs  Temp:  [97.16 °F (36.2 °C)-99.5 °F (37.5 °C)] 97.34 °F (36.3 °C)  Heart Rate:  [] 113  Resp:  [10-16] 14  BP: ()/(46-89) 85/72  Arterial Line BP: ()/() 108/67  FiO2 (%):  [30 %] 30 %  Body mass index is 36.43 kg/m².    Intake/Output Summary (Last 24 hours) at 11/1/2021 0832  Last data filed at 11/1/2021 0700  Gross per 24 hour   Intake 2095.4 ml   Output 1719 ml   Net 376.4 ml     No intake/output data recorded.     Physical Exam:   General: patient awake, alert and cooperative   Eyes: Normal lids and lashes, no scleral icterus   Neck: supple, normal ROM   Skin: warm and dry, not jaundiced   Cardiovascular: regular rhythm and rate, no murmurs auscultated   Pulm: clear to auscultation bilaterally, regular and unlabored   Abdomen: soft, nontender, nondistended; normal bowel sounds   Extremities: no rash or edema   Psychiatric: Normal mood and behavior; memory intact     Results Review:     I reviewed the patient's new clinical results.    Results from last 7 days   Lab Units 11/01/21  0426 10/31/21  1359 10/31/21  0250   WBC 10*3/mm3  7.52 9.55 7.15   HEMOGLOBIN g/dL 12.2* 12.7* 12.6*   HEMATOCRIT % 35.5* 39.5 37.3*   PLATELETS 10*3/mm3 64* 79* 44*     Results from last 7 days   Lab Units 11/01/21 0426 10/31/21  2130 10/31/21  1527 10/31/21  1201 10/31/21  0825 10/31/21  0250 10/30/21  2227 10/30/21  1842   SODIUM mmol/L 139  --   --   --   --  138  --  136   POTASSIUM mmol/L 3.4*  --   --  3.6  --  3.0*   < > 3.8   CHLORIDE mmol/L 103  --   --   --   --  100  --  101   CO2 mmol/L 26.5  --   --   --   --  24.6  --  19.2*   BUN mg/dL 37*  --   --   --   --  40*  --  43*   CREATININE mg/dL 1.85*  1.85* 2.06* 2.12*  --    < > 2.15*   < > 2.45*   CALCIUM mg/dL 7.4*  --   --   --   --  7.2*  --  7.1*   BILIRUBIN mg/dL 3.4*  --   --   --   --  3.9*  --  2.8*   ALK PHOS U/L 54  --   --   --   --  47  --  47   ALT (SGPT) U/L 3,451*  --   --   --   --  5,215*  --  5,772*   AST (SGOT) U/L 2,182*  --   --   --   --  >7,000*  --  >7,000*   GLUCOSE mg/dL 94  --   --   --   --  102*  --  143*    < > = values in this interval not displayed.     Results from last 7 days   Lab Units 11/01/21  0426 10/31/21  1359 10/31/21  0250   INR  1.65* 2.04* 2.52*     Lab Results   Lab Value Date/Time    LIPASE 32 10/29/2021 1247       Radiology:  XR Chest 1 View   Final Result   No significant interval change.       This report was finalized on 10/31/2021 5:16 AM by Dr. Henny Galvez M.D.          XR Chest 1 View   Final Result      XR Chest 1 View   Final Result      XR Chest 1 View   Final Result      CT Chest Without Contrast Diagnostic   Final Result   1. The findings suggest passive hepatic congestion with a tiny amount of   free fluid and 3rd spacing of fluid within the body wall. There are also   small-moderate-sized bilateral pleural effusions.   2. Moderately large left inguinal hernia containing a long segment of   sigmoid colon and bladder dome without obstruction or incarceration.   3. Cardiomegaly.       Discussed with Dr. Gillespie.       This report was  finalized on 10/29/2021 4:23 PM by Dr. Misti Georges M.D.          CT Abdomen Pelvis Without Contrast   Final Result   1. The findings suggest passive hepatic congestion with a tiny amount of   free fluid and 3rd spacing of fluid within the body wall. There are also   small-moderate-sized bilateral pleural effusions.   2. Moderately large left inguinal hernia containing a long segment of   sigmoid colon and bladder dome without obstruction or incarceration.   3. Cardiomegaly.       Discussed with Dr. Gillespie.       This report was finalized on 10/29/2021 4:23 PM by Dr. Misti Georges M.D.          XR Chest 1 View   Final Result   Subcentimeter bilateral calcified nodules are present   consistent with calcified granulomas. Heart size is enlarged. There is   no convincing evidence for active disease in the chest.       This report was finalized on 10/29/2021 12:27 PM by Dr. Kip Garcia M.D.              Assessment/Plan     Patient Active Problem List   Diagnosis   • Atrial fibrillation with rapid ventricular response (HCC)   • KRISHNA (acute kidney injury) (HCC)   • Hyperkalemia   • Right upper quadrant abdominal pain   • Elevated LFTs   • Mitral insufficiency, acute   • Cardiogenic shock (HCC)   • Coagulopathy (HCC)   • Thrombocytopenia (HCC)   • Other specified anemias   • Hypofibrinogenemia (HCC)       Assessment:  1. Elevated LFTs: Gradually improving, suspect secondary to shock liver.  #2 severe nonischemic cardiomyopathy    Plan:  · Continue to monitor LFTs, hope to see improvement as cardiac status improves.  I discussed the patients findings and my recommendations with patient and consulting provider   .    Rai Duncan MD

## 2021-11-01 NOTE — PROGRESS NOTES
"   LOS: 3 days    Patient Care Team:  Provider, No Known as PCP - General    Chief Complaint:    Chief Complaint   Patient presents with   • Abdominal Pain   • Nausea     Follow UP KRISHNA  Subjective     Interval History:   Unable to perform ROS.  He is sedated on the ventilator and has an impella device.    Objective     Vital Signs  Temp:  [96.98 °F (36.1 °C)-99.5 °F (37.5 °C)] 97.34 °F (36.3 °C)  Heart Rate:  [] 111  Resp:  [10-15] 14  BP: ()/(60-98) 124/96  Arterial Line BP: ()/() 117/82  FiO2 (%):  [30 %] 30 %    Flowsheet Rows      First Filed Value   Admission Height 175.3 cm (69\") Documented at 10/29/2021 1717   Admission Weight 99.8 kg (220 lb) Documented at 10/29/2021 1433          I/O this shift:  In: -   Out: 237 [Urine:237]  I/O last 3 completed shifts:  In: 5278.4 [I.V.:3887.4; Blood:923; IV Piggyback:468]  Out: 3149 [Urine:3149]    Intake/Output Summary (Last 24 hours) at 11/1/2021 1105  Last data filed at 11/1/2021 1000  Gross per 24 hour   Intake 1946 ml   Output 1871 ml   Net 75 ml       Physical Exam:  General Appearance: Sedated on the vent, no acute distress,   Skin: warm and dry  HEENT: pupils round and reactive to light, oral mucosa normal, nonicteric sclera  Neck: supple, no JVD, trachea midline  Lungs: rales bilaterally  Heart: RRR, normal S1 and S2, no S3, no rub  Abdomen: soft, nontender, normoactive bowels  : no palpable bladder,  Extremities: Diffuse edema upper extremities and trace lower extremities, cyanosis or clubbing       Results Review:    Results from last 7 days   Lab Units 11/01/21  0426 10/31/21  2130 10/31/21  1527 10/31/21  1201 10/31/21  0825 10/31/21  0250 10/30/21  2227 10/30/21  1842   SODIUM mmol/L 139  --   --   --   --  138  --  136   POTASSIUM mmol/L 3.4*  --   --  3.6  --  3.0*   < > 3.8   CHLORIDE mmol/L 103  --   --   --   --  100  --  101   CO2 mmol/L 26.5  --   --   --   --  24.6  --  19.2*   BUN mg/dL 37*  --   --   --   --  40*  --  43* "   CREATININE mg/dL 1.85*  1.85* 2.06* 2.12*  --    < > 2.15*   < > 2.45*   CALCIUM mg/dL 7.4*  --   --   --   --  7.2*  --  7.1*   BILIRUBIN mg/dL 3.4*  --   --   --   --  3.9*  --  2.8*   ALK PHOS U/L 54  --   --   --   --  47  --  47   ALT (SGPT) U/L 3,451*  --   --   --   --  5,215*  --  5,772*   AST (SGOT) U/L 2,182*  --   --   --   --  >7,000*  --  >7,000*   GLUCOSE mg/dL 94  --   --   --   --  102*  --  143*    < > = values in this interval not displayed.       Estimated Creatinine Clearance: 54.3 mL/min (A) (by C-G formula based on SCr of 1.85 mg/dL (H)).    Results from last 7 days   Lab Units 10/31/21  0250 10/30/21  2227 10/29/21  1940   MAGNESIUM mg/dL 2.1 2.2 2.3       Results from last 7 days   Lab Units 10/30/21  0356   URIC ACID mg/dL 16.8*       Results from last 7 days   Lab Units 11/01/21  0426 10/31/21  1359 10/31/21  0250 10/30/21  1719 10/30/21  1523 10/30/21  0356 10/29/21  1940 10/29/21  1940 10/29/21  1826   WBC 10*3/mm3 7.52 9.55 7.15  --   --  13.53*  --  11.70*  11.66*  --    HEMOGLOBIN g/dL 12.2* 12.7* 12.6* 14.7  --  16.2   < > 16.5  16.4  --    HEMOGLOBIN, POC g/dL  --   --   --   --  16.0  --   --   --    < >   PLATELETS 10*3/mm3 64* 79* 44*  --   --  71*  --  92*  98*  --     < > = values in this interval not displayed.       Results from last 7 days   Lab Units 11/01/21 0426 10/31/21  1359 10/31/21  0250 10/30/21  1719 10/30/21  0356   INR  1.65* 2.04* 2.52* 3.52* 3.87*         Imaging Results (Last 24 Hours)     ** No results found for the last 24 hours. **        digoxin, 250 mcg, Intravenous, Q6H  pantoprazole, 40 mg, Intravenous, Daily  piperacillin-tazobactam, 3.375 g, Intravenous, Q8H  sodium chloride, 10 mL, Intravenous, Q12H      heparin in 500 mL dextrose 5% in water (Impella), 14 mL/hr, Last Rate: 14 mL/hr (10/31/21 1256)  milrinone, 0.25-0.75 mcg/kg/min, Last Rate: 0.25 mcg/kg/min (11/01/21 1016)  norepinephrine, 0.02-0.3 mcg/kg/min, Last Rate: Stopped (11/01/21  0745)  phenylephrine, 0.5-3 mcg/kg/min, Last Rate: Stopped (10/30/21 0545)  propofol, 5-50 mcg/kg/min, Last Rate: 30 mcg/kg/min (11/01/21 0757)        Medication Review:   Current Facility-Administered Medications   Medication Dose Route Frequency Provider Last Rate Last Admin   • atropine sulfate injection 0.5 mg  0.5 mg Intravenous Q5 Min PRN Get Cunha MD       • digoxin (LANOXIN) injection 250 mcg  250 mcg Intravenous Q6H Get Cunha MD       • fentaNYL citrate (PF) (SUBLIMAZE) injection 50 mcg  50 mcg Intravenous Q30 Min PRN Get Cunha MD   50 mcg at 11/01/21 0856   • heparin (porcine) 25,000 Units in dextrose (D5W) 5 % 500 mL infusion  14 mL/hr Intra-arterial Continuous Get Cunha MD 14 mL/hr at 10/31/21 1256 14 mL/hr at 10/31/21 1256   • HYDROcodone-acetaminophen (NORCO) 5-325 MG per tablet 1 tablet  1 tablet Oral Q4H PRN Get Cunha MD       • milrinone (PRIMACOR) 20 mg in 100 mL D5W infusion  0.25-0.75 mcg/kg/min Intravenous Titrated Get Cunha MD 8.4 mL/hr at 11/01/21 1016 0.25 mcg/kg/min at 11/01/21 1016   • morphine injection 1 mg  1 mg Intravenous Q4H PRN Get Cunha MD        And   • naloxone (NARCAN) injection 0.4 mg  0.4 mg Intravenous Q5 Min PRN Get Cunha MD       • norepinephrine (LEVOPHED) 8 mg in 250 mL NS infusion (premix)  0.02-0.3 mcg/kg/min Intravenous Titrated Get Cunha MD   Stopped at 11/01/21 0745   • pantoprazole (PROTONIX) injection 40 mg  40 mg Intravenous Daily Get Cunha MD   40 mg at 11/01/21 0808   • phenylephrine (THONG-SYNEPHRINE) 50 mg in 250 mL NS infusion  0.5-3 mcg/kg/min Intravenous Titrated Get Cunha MD   Stopped at 10/30/21 0545   • piperacillin-tazobactam (ZOSYN) 3.375 g in iso-osmotic dextrose 50 ml (premix)  3.375 g Intravenous Q8H Get Cunha MD   3.375 g at 11/01/21 0435   • potassium chloride (K-DUR,KLOR-CON) ER tablet 40 mEq  40 mEq Oral PRN Padmini Ma MD        Or   • potassium chloride  (KLOR-CON) packet 40 mEq  40 mEq Oral PRN Padmini Ma MD        Or   • potassium chloride 10 mEq in 100 mL IVPB  10 mEq Intravenous Q1H PRN Padmini Ma  mL/hr at 11/01/21 1017 10 mEq at 11/01/21 1017   • propofol (DIPRIVAN) infusion 10 mg/mL 100 mL  5-50 mcg/kg/min Intravenous Titrated Kurt Evangelista MD 17.96 mL/hr at 11/01/21 0757 30 mcg/kg/min at 11/01/21 0757   • sodium chloride 0.9 % flush 10 mL  10 mL Intravenous PRN Get Cunha MD       • sodium chloride 0.9 % flush 10 mL  10 mL Intravenous PRN Get Cunha MD       • sodium chloride 0.9 % flush 10 mL  10 mL Intravenous Q12H Get Cunha MD   10 mL at 11/01/21 0808   • sodium chloride 0.9 % flush 10 mL  10 mL Intravenous PRN Get Cunha MD       • sodium chloride 0.9 % infusion 250 mL  250 mL Intravenous Once PRN Get Cunha MD           Assessment/Plan         Atrial fibrillation with rapid ventricular response (HCC)    KRISHNA (acute kidney injury) (HCC)    Hyperkalemia    Right upper quadrant abdominal pain    Elevated LFTs    Mitral insufficiency, acute    Cardiogenic shock (HCC)    Coagulopathy (HCC)    Thrombocytopenia (HCC)    Other specified anemias    Hypofibrinogenemia (HCC)      ASSESSMENT:  -KRISHNA, appears to be due to prerenal azotemia likely due to acute cardiorenal syndrome from poor effective circulating volume in the setting of cardiogenic shock  -Persistent hypotension due to volume depletion and cardiogenic shock  -Significantly elevated LFTs concerning for shock liver  -A. fib with RVR on presentation     PLAN:  -Some temporary improvement in output state and perhaps perfusion with placement of Impella device. Discussed with nursing staff and  Try a combination of albumin/lasix q 12 hours.  -Renal panel magnesium in a.m.      Lulu Rico MD  11/01/21  11:05 EDT

## 2021-11-01 NOTE — PROGRESS NOTES
"      [facility]         Dr Hicks Sayied   LOS: 3 days   Patient Care Team:  Provider, No Known as PCP - General    Chief Complaint: Acute liver failure suspected salicylate toxicity with cardiogenic shock coagulopathy lactic acidosis thrombocytopenia multiple issues ongoing    Interval History: Events noted chart reviewed.  More arousable and per nursing staff he is following commands intermittently.  Remains on low-dose propofol.  Remains on 30% FiO2 with PEEP of 5.  Currently off all pressors.    REVIEW OF SYSTEMS:   Unable to get with patient's current condition    Ventilator/Non-Invasive Ventilation Settings (From admission, onward)             Start     Ordered    10/30/21 0555  Ventilator - AC/VC; (16); 30; 10; 550  Continuous        Question Answer Comment   Vent Mode AC/VC    Breath rate  16   FiO2 30    PEEP 10    Tidal Volume 550        10/30/21 0554                  Vital Signs  Temp:  [97.16 °F (36.2 °C)-99.5 °F (37.5 °C)] 97.34 °F (36.3 °C)  Heart Rate:  [] 113  Resp:  [10-16] 14  BP: ()/(60-89) 85/72  Arterial Line BP: ()/() 108/67  FiO2 (%):  [30 %] 30 %    Intake/Output Summary (Last 24 hours) at 11/1/2021 1034  Last data filed at 11/1/2021 0800  Gross per 24 hour   Intake 1992.4 ml   Output 1696 ml   Net 296.4 ml     Flowsheet Rows      First Filed Value   Admission Height 175.3 cm (69\") Documented at 10/29/2021 1717   Admission Weight 99.8 kg (220 lb) Documented at 10/29/2021 1433          Physical Exam:  Patient is examined using the personal protective equipment as per guidelines from infection control for this particular patient as enacted.  Hand hygiene was performed before and after patient interaction.   General Appearance:   Sedated intubated.  ET tube good position orally.   Neck midline trachea, no thyromegaly   Lungs:    Equal breath sounds on the vent    Heart:    Regular rhythm and normal rate, normal S1 and S2, no            murmur, no gallop, " no rub, no click   Chest Wall:    No abnormalities observed   Abdomen:    Soft no masses felt   Extremities:  1+ edema, no cyanosis, no             redness.  Perfusion of lower extremity has improved  CNS sedated intubated  Skin no rashes no nodules  Musculoskeletal no cyanosis no clubbing normal range of motion     Results Review:        Results from last 7 days   Lab Units 11/01/21  0426 10/31/21  2130 10/31/21  1527 10/31/21  1201 10/31/21  0825 10/31/21  0250 10/31/21  0250 10/30/21  2227 10/30/21  1842   SODIUM mmol/L 139  --   --   --   --   --  138  --  136   POTASSIUM mmol/L 3.4*  --   --  3.6  --   --  3.0*   < > 3.8   CHLORIDE mmol/L 103  --   --   --   --   --  100  --  101   CO2 mmol/L 26.5  --   --   --   --   --  24.6  --  19.2*   BUN mg/dL 37*  --   --   --   --   --  40*  --  43*   CREATININE mg/dL 1.85*  1.85* 2.06* 2.12*  --    < >  --  2.15*   < > 2.45*   GLUCOSE mg/dL 94  --   --   --   --    < > 102*   < > 143*   CALCIUM mg/dL 7.4*  --   --   --   --   --  7.2*  --  7.1*    < > = values in this interval not displayed.     Results from last 7 days   Lab Units 10/31/21  1527 10/31/21  0825 10/30/21  2227 10/30/21  1719 10/29/21  1247   CK TOTAL U/L 339* 347* 351*   < >  --    TROPONIN T ng/mL  --   --   --   --  0.038*    < > = values in this interval not displayed.     Results from last 7 days   Lab Units 11/01/21  0426 10/31/21  1359 10/31/21  0250   WBC 10*3/mm3 7.52 9.55 7.15   HEMOGLOBIN g/dL 12.2* 12.7* 12.6*   HEMATOCRIT % 35.5* 39.5 37.3*   PLATELETS 10*3/mm3 64* 79* 44*     Results from last 7 days   Lab Units 11/01/21  0426 10/31/21  1359 10/31/21  0250   INR  1.65* 2.04* 2.52*   APTT seconds 53.4* 56.3* 58.4*         Results from last 7 days   Lab Units 10/31/21  0250   MAGNESIUM mg/dL 2.1         Results from last 7 days   Lab Units 11/01/21  0801   PH, ARTERIAL pH units 7.459*   PO2 ART mm Hg 127.3*   PCO2, ARTERIAL mm Hg 37.0   HCO3 ART mmol/L 26.3       I reviewed the patient's new  clinical results.  I personally viewed and interpreted the patient's chest x-ray.        Medication Review:   pantoprazole, 40 mg, Intravenous, Daily  piperacillin-tazobactam, 3.375 g, Intravenous, Q8H  sodium chloride, 10 mL, Intravenous, Q12H        heparin in 500 mL dextrose 5% in water (Impella), 14 mL/hr, Last Rate: 14 mL/hr (10/31/21 1256)  milrinone, 0.25-0.75 mcg/kg/min, Last Rate: 0.25 mcg/kg/min (11/01/21 1016)  norepinephrine, 0.02-0.3 mcg/kg/min, Last Rate: Stopped (11/01/21 3845)  phenylephrine, 0.5-3 mcg/kg/min, Last Rate: Stopped (10/30/21 0545)  propofol, 5-50 mcg/kg/min, Last Rate: 30 mcg/kg/min (11/01/21 0092)        ASSESSMENT:   1. Acute liver failure: Suspect salicylate toxicity per history, subacute/acute on chronic.  2. Acute cardiogenic shock  3. Acute respiratory failure on vent  4. A. fib with RVR  5. KRISHNA: Mostly volume depletion due to liver failure  6. Electrolytes disturbance: Acute life-threatening hyperkalemia.  Hyponatremia.  7. Coagulopathy, secondary to acute liver disease  8. Lactic acidosis, secondary to liver failure  9. Slightly evaded troponin: Likely secondary to KRISHNA  10. Erythrocytosis, likely more hemoconcentration  11. Bilateral pleural effusion, mild  12. Thrombocytopenia  13. Subcentimetric bilateral calcified nodules  14. Altered mental status      PLAN:  More stable on the vent now FiO2 down to 30% PEEP of 5.  We will continue with current vent support until hemodynamically more stable.  Wean from the vent as tolerated.  Currently in cardiogenic shock with [place] in place.  Impella placed per cardiology.  Cardiac output has improved.  Milrinone drip to be initiated per cardiology  Improving kidney function and hyperkalemia now resolved.  Appreciate input from nephrology  Coagulopathy with thrombocytopenia.  Appreciate input from hematology.  Platelet transfusion done yesterday  Lactic acidosis improving with current supportive care  Electrolyte management per  nephrology  Currently on antibiotics to cover for pneumonia and sepsis.  Patient remains critically ill  ICU core measures.  Initiate tube feeds if okay with cardiology.  Doppler left upper extremity out of proportion swelling noted.    Critical care time 35 minutes      Kurt Evangelista MD  11/01/21  10:34 EDT

## 2021-11-01 NOTE — PROGRESS NOTES
"Denis Chavez  1965 56 y.o.  [id number]      Patient Care Team:  Provider, No Known as PCP - General    CC: Cardiogenic shock    Interval History: He is actually arousable moves all his extremities off all pressors      Objective   Vital Signs  Temp:  [97.16 °F (36.2 °C)-99.5 °F (37.5 °C)] 97.34 °F (36.3 °C)  Heart Rate:  [] 113  Resp:  [10-15] 14  BP: ()/(60-89) 85/72  Arterial Line BP: ()/() 108/67  FiO2 (%):  [30 %] 30 %    Intake/Output Summary (Last 24 hours) at 11/1/2021 1053  Last data filed at 11/1/2021 1000  Gross per 24 hour   Intake 1992.4 ml   Output 1921 ml   Net 71.4 ml     Flowsheet Rows      First Filed Value   Admission Height 175.3 cm (69\") Documented at 10/29/2021 1717   Admission Weight 99.8 kg (220 lb) Documented at 10/29/2021 1433          Physical Exam:   General Appearance:   Arousable, in no acute distress   Lungs:     Clear to auscultation,BS are equal    Heart:    Normal S1 and S2, iRRR without murmur, gallop or rub   HEENT:    Sclerae are clear, no JVD or adenopathy   Abdomen:     Normal bowel sounds, soft nontender, nondistended, no HSM   Extremities:   Moves all extremities well, diffuse edema, no cyanosis, no             Redness, no rash     Medication Review:      digoxin, 250 mcg, Intravenous, Q6H  pantoprazole, 40 mg, Intravenous, Daily  piperacillin-tazobactam, 3.375 g, Intravenous, Q8H  sodium chloride, 10 mL, Intravenous, Q12H      heparin in 500 mL dextrose 5% in water (Impella), 14 mL/hr, Last Rate: 14 mL/hr (10/31/21 1256)  milrinone, 0.25-0.75 mcg/kg/min, Last Rate: 0.25 mcg/kg/min (11/01/21 1016)  norepinephrine, 0.02-0.3 mcg/kg/min, Last Rate: Stopped (11/01/21 0845)  phenylephrine, 0.5-3 mcg/kg/min, Last Rate: Stopped (10/30/21 0445)  propofol, 5-50 mcg/kg/min, Last Rate: 30 mcg/kg/min (11/01/21 2797)          I reviewed the patient's new clinical results.  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       Critically ill gentleman with a severe nonischemic cardiomyopathy the etiology is unclear I really do not think it is alcohol ingestion is possible it is myocarditis also could be a tachycardia mediated cardiomyopathy.  His LV function actually looks much improved today on his echo and receiving pulsatility on his arterial waveform which is a good sign right ventricle still is very sluggish however his liver functions are much improved and his renal function is improved and his platelet count is improved.  We see some optimistic signs here although he still is critically ill and the exact etiology of what is happened to him is unclear.  Long discussion with his family would not start milrinone on him when a dig load him and will get a continue maximal medical support    Get Cunha MD  11/01/21  10:53 EDT

## 2021-11-01 NOTE — CASE MANAGEMENT/SOCIAL WORK
Discharge Planning Assessment  [facility]     Patient Name: Denis Chavez  MRN: [id number]  Today's Date: 11/1/2021    Admit Date: 10/29/2021     Discharge Needs Assessment     Row Name 11/01/21 1405       Living Environment    Lives With alone    Current Living Arrangements home/apartment/condo    Potentially Unsafe Housing Conditions unable to assess    Primary Care Provided by self    Provides Primary Care For no one    Family Caregiver if Needed sibling(s)    Quality of Family Relationships supportive    Able to Return to Prior Arrangements yes       Resource/Environmental Concerns    Resource/Environmental Concerns none       Transition Planning    Patient/Family Anticipates Transition to home with family    Patient/Family Anticipated Services at Transition none    Transportation Anticipated family or friend will provide       Discharge Needs Assessment    Current Outpatient/Agency/Support Group other (see comments)    Equipment Currently Used at Home none    Concerns to be Addressed discharge planning    Equipment Needed After Discharge other (see comments)               Discharge Plan     Row Name 11/01/21 1406       Plan    Plan Undetermined    Plan Comments CCP spoke to patient's brother Owen [phone] at bedside.  CCP role explained  Discharge planning discussed.  Pt PCP is not known.  Pt lives in a house alone.  He uses no DME to ambulate.  He is independent with ADL's.  Pt currently sedated on the [facility]  CCP following for discharge needs              Continued Care and Services - Admitted Since 10/29/2021    Coordination has not been started for this encounter.          Demographic Summary    No documentation.                Functional Status    No documentation.                Psychosocial    No documentation.                Abuse/Neglect    No documentation.                Legal    No documentation.                Substance Abuse    No documentation.                Patient Forms    No  documentation.                   Sabrina Ortiz RN

## 2021-11-01 NOTE — CONSULTS
"Adult Nutrition  Assessment/PES    Patient Name:  Denis Chavez  YOB: 1965  MRN: [id number]  Admit Date:  10/29/2021    Assessment Date:  11/1/2021    Comments:  Nutrition consult for ND cortrak and TF assessment. Pt on vent receiving Propofol 17.96. Discussed care in rounds. Will start trickle feeds only at this time with Peptamen Intense at 10cc/hr no water flushes. Will continue to follow and monitor.      Reason for Assessment     Row Name 11/01/21 1610          Reason for Assessment    Reason For Assessment physician consult; TF/PN     Diagnosis --  cardiogenic shock, severe nonischemic cardiomyopathy, afib with rapid ventricular response, KRISHNA, liver shock, Impella device.  On vent                Nutrition/Diet History     Row Name 11/01/21 1610          Nutrition/Diet History    Typical Food/Fluid Intake cortrak and TF's per rounds                Anthropometrics     Row Name 11/01/21 1611          Anthropometrics    Height 175.3 cm (69.02\")     Weight 112 kg (246 lb 11.1 oz)  not weighed by RD            Ideal Body Weight (IBW)    Ideal Body Weight (IBW) (kg) 73.73     % Ideal Body Weight 151.77            Body Mass Index (BMI)    BMI (kg/m2) 36.49     BMI Assessment BMI 35-39.9: obesity grade II                Labs/Tests/Procedures/Meds     Row Name 11/01/21 1611          Labs/Procedures/Meds    Lab Results Reviewed reviewed     Lab Results Comments BUN,cr, k, alb, alt, ast, tbili            Diagnostic Tests/Procedures    Diagnostic Test/Procedure Reviewed reviewed            Medications    Pertinent Medications Reviewed reviewed     Pertinent Medications Comments peridex, lanoxin, protonix, zosyn, heprain, levo, magdalena, propofol                Physical Findings     Row Name 11/01/21 1612          Physical Findings    Overall Physical Appearance on ventilator support; obese     Gastrointestinal feeding tube     Tubes nasoduodenal tube     Skin edema                Estimated/Assessed Needs     Row " "Name 11/01/21 1612 11/01/21 1611       Calculation Measurements    Weight Used For Calculations 112 kg (246 lb 14.6 oz) --    Height -- 175.3 cm (69.02\")       Estimated/Assessed Needs    Additional Documentation KCAL/KG (Group); Fluid Requirements (Group); Protein Requirements (Group) --       KCAL/KG    KCAL/KG 15 Kcal/Kg (kcal); 20 Kcal/Kg (kcal) --    15 Kcal/Kg (kcal) 1680 --    20 Kcal/Kg (kcal) 2240 --       Protein Requirements    Weight Used For Protein Calculations 73.7 kg (162 lb 8.7 oz) --    Est Protein Requirement Amount (gms/kg) 1.0 gm protein --    Estimated Protein Requirements (gms/day) 73.73 --       Fluid Requirements    Fluid Requirements (mL/day) --  per MD --               Nutrition Prescription Ordered     Row Name 11/01/21 1613          Nutrition Prescription PO    Current PO Diet NPO            Nutrition Prescription EN    Enteral Route ND     Product Peptamen Intense VHP (Vital HP)     TF Delivery Method Continuous     Continuous TF Goal Rate (mL/hr) 10 mL/hr     Water flush (mL)  0 mL                       Problem/Interventions:   Problem 1     Row Name 11/01/21 1614          Nutrition Diagnoses Problem 1    Problem 1 Needs Alternate Route     Etiology (related to) Medical Diagnosis     Pulmonary/Critical Care Ventilator     Signs/Symptoms (evidenced by) NPO                      Intervention Goal     Row Name 11/01/21 1615          Intervention Goal    General Maintain nutrition; Nutrition support treatment; Improved nutrition related lab(s); Reduce/improve symptoms; Meet nutritional needs for age/condition; Disease management/therapy     TF/PN Inititiate TF/PN; Tolerate TF at goal     Weight Appropriate weight loss                Nutrition Intervention     Row Name 11/01/21 1615          Nutrition Intervention    RD/Tech Action Follow Tx progress; Care plan reviewd                Nutrition Prescription     Row Name 11/01/21 1615          Nutrition Prescription EN    Enteral Prescription " Enteral begin/change     Enteral Route ND     Product Peptamen Intense VHP     TF Delivery Method Continuous     Continuous TF Goal Rate (mL/hr) 10 mL/hr                Education/Evaluation     Row Name 11/01/21 5065          Education    Education Education not appropriate at this time     Please explain Patient intubated            Monitor/Evaluation    Monitor Per protocol                 Electronically signed by:  Marlen Saldivar RD  11/01/21 16:17 EDT

## 2021-11-01 NOTE — PLAN OF CARE
Problem: Adult Inpatient Plan of Care  Goal: Plan of Care Review  Outcome: Ongoing, Progressing  Flowsheets  Taken 11/1/2021 1836 by Leonor Root RN  Outcome Summary: Remains on vent support. Awakens easily, follows simple commands, nods approp., smiling at staff and family. Cont with impella support. C.O up trending up. Not requiring pressors. CVP around 9-10. IVF added at 125cc/hr. Digoxin added today. Cortrak placed and trickle feeding started. F/C replaced today r/t leaking, tip was full of sediment. Great UOP. Overall skin color and temperature improved. Palpable pulses.  Taken 11/1/2021 0562 by Keila Parry, RN  Plan of Care Reviewed With:   patient   sibling

## 2021-11-01 NOTE — PROGRESS NOTES
[facility] INPATIENT PROGRESS NOTE    Length of Stay:  3 days    CHIEF COMPLAINT: Cardiogenic shock with multiorgan failure, coagulopathy, thrombocytopenia      SUBJECTIVE: Patient is intubated, sedated.  Identification of right IJ DVT on Doppler 10/31/2021.  Patient currently off pressor support.  Note plans to initiate milrinone per cardiology.  No bleeding issues on anticoagulation with heparin.      ROS:  Review of Systems unable to obtain full review of systems due to patient currently intubated and sedated.  Information obtained from patient's nurse at bedside.    OBJECTIVE:  Vitals:    11/01/21 0500 11/01/21 0515 11/01/21 0600 11/01/21 0751   BP: (!) 86/66  (!) 85/72    Pulse: 95  94 113   Resp:    14   Temp: 97.16 °F (36.2 °C)  97.16 °F (36.2 °C) 97.34 °F (36.3 °C)   TempSrc:       SpO2: 100%   94%   Weight:  112 kg (246 lb 11.1 oz)     Height:             PHYSICAL EXAMINATION:  General: Patient is intubated, sedated  Chest/Lungs: Clear to auscultation bilaterally anteriorly  Heart: Regular rate and rhythm  Abdomen/GI: Soft nontender nondistended bowel sounds present  Extremities: Edema noted in the right upper extremities, right greater than left.  Trace edema bilateral lower extremities.  Line sites in neck, groins bilaterally with no evidence of bleeding.    DIAGNOSTIC DATA:  Results Review:     I reviewed the patient's new clinical results.    Results from last 7 days   Lab Units 11/01/21  0426 10/31/21  1359 10/31/21  0250   WBC 10*3/mm3 7.52 9.55 7.15   HEMOGLOBIN g/dL 12.2* 12.7* 12.6*   HEMATOCRIT % 35.5* 39.5 37.3*   PLATELETS 10*3/mm3 64* 79* 44*      Results from last 7 days   Lab Units 11/01/21  0426 10/31/21  2130 10/31/21  1527 10/31/21  1201 10/31/21  0825 10/31/21  0250 10/30/21  2227 10/30/21  1842   SODIUM mmol/L 139  --   --   --   --  138  --  136   POTASSIUM mmol/L 3.4*  --   --  3.6  --  3.0*   < > 3.8   CHLORIDE mmol/L 103  --   --   --   --  100  --  101   CO2 mmol/L  26.5  --   --   --   --  24.6  --  19.2*   BUN mg/dL 37*  --   --   --   --  40*  --  43*   CREATININE mg/dL 1.85*  1.85* 2.06* 2.12*  --    < > 2.15*   < > 2.45*   CALCIUM mg/dL 7.4*  --   --   --   --  7.2*  --  7.1*   BILIRUBIN mg/dL 3.4*  --   --   --   --  3.9*  --  2.8*   ALK PHOS U/L 54  --   --   --   --  47  --  47   ALT (SGPT) U/L 3,451*  --   --   --   --  5,215*  --  5,772*   AST (SGOT) U/L 2,182*  --   --   --   --  >7,000*  --  >7,000*   GLUCOSE mg/dL 94  --   --   --   --  102*  --  143*    < > = values in this interval not displayed.      Lab Results   Component Value Date    NEUTROABS 6.06 11/01/2021     Results from last 7 days   Lab Units 11/01/21  0426 10/31/21  1359 10/31/21  0250   INR  1.65* 2.04* 2.52*   APTT seconds 53.4* 56.3* 58.4*     Results from last 7 days   Lab Units 10/31/21  0250   MAGNESIUM mg/dL 2.1       Assessment/Plan   ASSESSMENT/PLAN:  This is a 56 y.o. male with:     Cardiogenic shock, atrial fibrillation  · Biventricular failure, etiology unclear  · Patient requiring pressor support  · Patient had transthoracic echocardiogram, and cardiac catheterization on 10/29/2021.  · Status post cardioversion x2, persistent atrial fibrillation  · Impella device placed, initiated heparin  · Patient had POOJA on 10/30/2021 with ejection fraction 26-30%, severe dilation right ventricular cavity, moderate mitral regurgitation, Impella device in left ventricle.  · Investigation regarding potential cardiac transplant per cardiology, does not appear feasible due to lack of social support.  · Patient currently off of pressors, starting milrinone     *Acute hypoxic respiratory failure    · Patient remains on ventilatory support     *Shock liver  · Severe hepatic dysfunction secondary to cardiogenic shock  · LFTs today with ALT 3451, AST 2182, total bilirubin 3.4.    *KRISHNA  · Secondary to cardiogenic shock  · Nephrology following  · Creatinine today trending down at 1.85     *Coagulopathy with  hypofibrinogenemia  · Likely secondary to shock liver and reduced synthetic function in addition to possible effects from Impella device producing consumption  · Heparin drip initiated with placement of Impella device  · Patient has received FFP, cryoprecipitate, vitamin K  · PT mixing study 10/31/2021 with decreased from 22.3 down to 14.9 (near correction indicating likely a factor deficiency)  · Labs today with INR improved at 1.65, fibrinogen improved at 255.  PTT therapeutic on heparin at 53.4.  We will recheck coags later this evening.  Reluctant to administer further FFP/cryoprecipitate in the setting of active thrombosis (see below).    *Right upper extremity/IJ catheter associated/IJ DVT  · Doppler on 10/31/2021 with acute right upper extremity catheter associated IJ thrombus, acute left upper extremity superficial thrombophlebitis (cephalic).  · Patient continuing on heparin with Impella device (heparin initiated 10/30/2021)  · Patient currently continuing on heparin at fixed rate with Impella device initiated 10/30/2021.  Patient with underlying coagulopathy and thrombocytopenia.  Reluctant to increase heparin rate currently in setting of thrombocytopenia and coagulopathy.  PTT today is 53.4.    *Thrombocytopenia.   · No prior labs available  · On admission 10/29/2021 platelets 131,000.   · Platelets trended down, 71,000 on 10/30/2021  · Patient received platelet transfusion 2 units around time of placement of Impella device  · On 10/31/2021 platelet count 44,000, received 1 additional unit platelets with increase to 79,000  · Today, platelet count 64,000.  No current bleeding issues.  Reluctant to transfuse further platelets in the setting of active thrombosis as above.  Recheck platelet count this evening.     *Erythrocytosis  · Hematocrit on admission was 53.3 10/29/2021  · Hematocrit has gradually declined since admission into the 12-13 range  · Current hematocrit 35.5    *Possible  pneumonia/sepsis  · Patient receiving empiric Zosyn.     PLAN:  1. Continue current fixed dose heparin being administered with Impella device.  Therapeutic INR in the setting of catheter associated right IJ DVT.  2. Monitor for signs of bleeding on anticoagulation in the setting of coagulopathy and thrombocytopenia  3. At 3 PM we will repeat CBC, PT, PTT, fibrinogen  4. Daily CBC, CMP, PT, PTT, fibrinogen.    Discussed with patient's brother at bedside    The patient and all of the above issues were new to me today.  Reviewed multiple records including progress notes, laboratory studies, radiographic results as outlined above           Denis Melgoza MD

## 2021-11-02 ENCOUNTER — APPOINTMENT (OUTPATIENT)
Dept: CARDIOLOGY | Facility: HOSPITAL | Age: 56
End: 2021-11-02

## 2021-11-02 LAB
ALBUMIN SERPL-MCNC: 3.2 G/DL (ref 3.5–5.2)
ALBUMIN/GLOB SERPL: 1.8 G/DL
ALP SERPL-CCNC: 62 U/L (ref 39–117)
ALT SERPL W P-5'-P-CCNC: 2723 U/L (ref 1–41)
ANION GAP SERPL CALCULATED.3IONS-SCNC: 11.6 MMOL/L (ref 5–15)
APTT PPP: 30.5 SECONDS (ref 22.7–35.4)
APTT PPP: 42.2 SECONDS (ref 22.7–35.4)
ARTERIAL PATENCY WRIST A: POSITIVE
ARTERIAL PATENCY WRIST A: POSITIVE
AST SERPL-CCNC: 985 U/L (ref 1–40)
ATMOSPHERIC PRESS: 757.2 MMHG
ATMOSPHERIC PRESS: 758.2 MMHG
BASE EXCESS BLDA CALC-SCNC: 5.9 MMOL/L (ref 0–2)
BASE EXCESS BLDA CALC-SCNC: 6.9 MMOL/L (ref 0–2)
BASOPHILS # BLD AUTO: 0.01 10*3/MM3 (ref 0–0.2)
BASOPHILS # BLD AUTO: 0.01 10*3/MM3 (ref 0–0.2)
BASOPHILS NFR BLD AUTO: 0.1 % (ref 0–1.5)
BASOPHILS NFR BLD AUTO: 0.1 % (ref 0–1.5)
BDY SITE: ABNORMAL
BDY SITE: ABNORMAL
BH CV ECHO MEAS - EF(MOD-BP): 50 %
BILIRUB SERPL-MCNC: 5.4 MG/DL (ref 0–1.2)
BUN SERPL-MCNC: 30 MG/DL (ref 6–20)
BUN/CREAT SERPL: 18.4 (ref 7–25)
CA-I BLD-MCNC: 4.4 MG/DL (ref 4.6–5.4)
CA-I BLD-MCNC: 4.4 MG/DL (ref 4.6–5.4)
CA-I BLD-MCNC: 4.5 MG/DL (ref 4.6–5.4)
CA-I SERPL ISE-MCNC: 1.1 MMOL/L (ref 1.15–1.35)
CA-I SERPL ISE-MCNC: 1.11 MMOL/L (ref 1.15–1.35)
CA-I SERPL ISE-MCNC: 1.12 MMOL/L (ref 1.15–1.35)
CALCIUM SPEC-SCNC: 8 MG/DL (ref 8.6–10.5)
CHLORIDE SERPL-SCNC: 102 MMOL/L (ref 98–107)
CO2 SERPL-SCNC: 28.4 MMOL/L (ref 22–29)
CREAT SERPL-MCNC: 1.28 MG/DL (ref 0.76–1.27)
CREAT SERPL-MCNC: 1.5 MG/DL (ref 0.76–1.27)
CREAT SERPL-MCNC: 1.63 MG/DL (ref 0.76–1.27)
CREAT SERPL-MCNC: 1.66 MG/DL (ref 0.76–1.27)
D-LACTATE SERPL-SCNC: 1.3 MMOL/L (ref 0.5–2)
D-LACTATE SERPL-SCNC: 1.4 MMOL/L (ref 0.5–2)
D-LACTATE SERPL-SCNC: 1.5 MMOL/L (ref 0.5–2)
DEPRECATED RDW RBC AUTO: 48.4 FL (ref 37–54)
DEPRECATED RDW RBC AUTO: 50.8 FL (ref 37–54)
EOSINOPHIL # BLD AUTO: 0.05 10*3/MM3 (ref 0–0.4)
EOSINOPHIL # BLD AUTO: 0.11 10*3/MM3 (ref 0–0.4)
EOSINOPHIL NFR BLD AUTO: 0.7 % (ref 0.3–6.2)
EOSINOPHIL NFR BLD AUTO: 1.6 % (ref 0.3–6.2)
ERYTHROCYTE [DISTWIDTH] IN BLOOD BY AUTOMATED COUNT: 14.4 % (ref 12.3–15.4)
ERYTHROCYTE [DISTWIDTH] IN BLOOD BY AUTOMATED COUNT: 15 % (ref 12.3–15.4)
FIBRINOGEN PPP-MCNC: 299 MG/DL (ref 219–464)
GFR SERPL CREATININE-BSD FRML MDRD: 43 ML/MIN/1.73
GFR SERPL CREATININE-BSD FRML MDRD: 44 ML/MIN/1.73
GFR SERPL CREATININE-BSD FRML MDRD: 48 ML/MIN/1.73
GFR SERPL CREATININE-BSD FRML MDRD: 58 ML/MIN/1.73
GLOBULIN UR ELPH-MCNC: 1.8 GM/DL
GLUCOSE BLDC GLUCOMTR-MCNC: 124 MG/DL (ref 70–130)
GLUCOSE BLDC GLUCOMTR-MCNC: 99 MG/DL (ref 70–130)
GLUCOSE SERPL-MCNC: 108 MG/DL (ref 65–99)
HCO3 BLDA-SCNC: 29.6 MMOL/L (ref 22–28)
HCO3 BLDA-SCNC: 31.9 MMOL/L (ref 22–28)
HCT VFR BLD AUTO: 37 % (ref 37.5–51)
HCT VFR BLD AUTO: 39.9 % (ref 37.5–51)
HGB BLD-MCNC: 12.3 G/DL (ref 13–17.7)
HGB BLD-MCNC: 13 G/DL (ref 13–17.7)
IMM GRANULOCYTES # BLD AUTO: 0.03 10*3/MM3 (ref 0–0.05)
IMM GRANULOCYTES NFR BLD AUTO: 0.4 % (ref 0–0.5)
INHALED O2 CONCENTRATION: 30 %
INHALED O2 CONCENTRATION: 30 %
INR PPP: 1.26 (ref 0.9–1.1)
INR PPP: 1.29 (ref 0.9–1.1)
LYMPHOCYTES # BLD AUTO: 0.46 10*3/MM3 (ref 0.7–3.1)
LYMPHOCYTES # BLD AUTO: 0.5 10*3/MM3 (ref 0.7–3.1)
LYMPHOCYTES NFR BLD AUTO: 6.7 % (ref 19.6–45.3)
LYMPHOCYTES NFR BLD AUTO: 7.1 % (ref 19.6–45.3)
MAXIMAL PREDICTED HEART RATE: 163 BPM
MCH RBC QN AUTO: 30.3 PG (ref 26.6–33)
MCH RBC QN AUTO: 30.9 PG (ref 26.6–33)
MCHC RBC AUTO-ENTMCNC: 32.6 G/DL (ref 31.5–35.7)
MCHC RBC AUTO-ENTMCNC: 33.2 G/DL (ref 31.5–35.7)
MCV RBC AUTO: 93 FL (ref 79–97)
MCV RBC AUTO: 93 FL (ref 79–97)
MODALITY: ABNORMAL
MODALITY: ABNORMAL
MONOCYTES # BLD AUTO: 0.52 10*3/MM3 (ref 0.1–0.9)
MONOCYTES # BLD AUTO: 0.76 10*3/MM3 (ref 0.1–0.9)
MONOCYTES NFR BLD AUTO: 11.1 % (ref 5–12)
MONOCYTES NFR BLD AUTO: 7.4 % (ref 5–12)
NEUTROPHILS NFR BLD AUTO: 5.45 10*3/MM3 (ref 1.7–7)
NEUTROPHILS NFR BLD AUTO: 5.9 10*3/MM3 (ref 1.7–7)
NEUTROPHILS NFR BLD AUTO: 79.5 % (ref 42.7–76)
NEUTROPHILS NFR BLD AUTO: 84.3 % (ref 42.7–76)
NRBC BLD AUTO-RTO: 0.4 /100 WBC (ref 0–0.2)
O2 A-A PPRESDIFF RESPIRATORY: 0.6 MMHG
O2 A-A PPRESDIFF RESPIRATORY: 0.6 MMHG
PCO2 BLDA: 38.6 MM HG (ref 35–45)
PCO2 BLDA: 46.1 MM HG (ref 35–45)
PEEP RESPIRATORY: 10 CM[H2O]
PEEP RESPIRATORY: 10 CM[H2O]
PF4 HEPARIN CMPLX IGG SERPL IA: 0.12 OD (ref 0–0.4)
PH BLDA: 7.45 PH UNITS (ref 7.35–7.45)
PH BLDA: 7.49 PH UNITS (ref 7.35–7.45)
PLATELET # BLD AUTO: 38 10*3/MM3 (ref 140–450)
PLATELET # BLD AUTO: 56 10*3/MM3 (ref 140–450)
PMV BLD AUTO: 12.2 FL (ref 6–12)
PMV BLD AUTO: 12.6 FL (ref 6–12)
PO2 BLDA: 109.9 MM HG (ref 80–100)
PO2 BLDA: 95.1 MM HG (ref 80–100)
POTASSIUM SERPL-SCNC: 3.5 MMOL/L (ref 3.5–5.2)
POTASSIUM SERPL-SCNC: 3.6 MMOL/L (ref 3.5–5.2)
PROT SERPL-MCNC: 5 G/DL (ref 6–8.5)
PROTHROMBIN TIME: 15.6 SECONDS (ref 11.7–14.2)
PROTHROMBIN TIME: 15.9 SECONDS (ref 11.7–14.2)
QT INTERVAL: 347 MS
RBC # BLD AUTO: 3.98 10*6/MM3 (ref 4.14–5.8)
RBC # BLD AUTO: 4.29 10*6/MM3 (ref 4.14–5.8)
SAO2 % BLDCOA: 97.6 % (ref 92–99)
SAO2 % BLDCOA: 98.7 % (ref 92–99)
SET MECH RESP RATE: 10
SET MECH RESP RATE: 10
SODIUM SERPL-SCNC: 142 MMOL/L (ref 136–145)
STRESS TARGET HR: 139 BPM
TOTAL RATE: 12 BREATHS/MINUTE
TOTAL RATE: 12 BREATHS/MINUTE
TRIGL SERPL-MCNC: 102 MG/DL (ref 0–150)
VENTILATOR MODE: ABNORMAL
VENTILATOR MODE: AC
WBC # BLD AUTO: 6.86 10*3/MM3 (ref 3.4–10.8)
WBC # BLD AUTO: 7.01 10*3/MM3 (ref 3.4–10.8)

## 2021-11-02 PROCEDURE — 84132 ASSAY OF SERUM POTASSIUM: CPT | Performed by: INTERNAL MEDICINE

## 2021-11-02 PROCEDURE — 85025 COMPLETE CBC W/AUTO DIFF WBC: CPT | Performed by: INTERNAL MEDICINE

## 2021-11-02 PROCEDURE — 93308 TTE F-UP OR LMTD: CPT

## 2021-11-02 PROCEDURE — 25010000002 PROPOFOL 10 MG/ML EMULSION: Performed by: INTERNAL MEDICINE

## 2021-11-02 PROCEDURE — 84478 ASSAY OF TRIGLYCERIDES: CPT | Performed by: INTERNAL MEDICINE

## 2021-11-02 PROCEDURE — 99232 SBSQ HOSP IP/OBS MODERATE 35: CPT | Performed by: INTERNAL MEDICINE

## 2021-11-02 PROCEDURE — 85610 PROTHROMBIN TIME: CPT | Performed by: INTERNAL MEDICINE

## 2021-11-02 PROCEDURE — C1769 GUIDE WIRE: HCPCS | Performed by: INTERNAL MEDICINE

## 2021-11-02 PROCEDURE — 82962 GLUCOSE BLOOD TEST: CPT

## 2021-11-02 PROCEDURE — 25010000002 ALBUMIN HUMAN 25% PER 50 ML: Performed by: INTERNAL MEDICINE

## 2021-11-02 PROCEDURE — 83605 ASSAY OF LACTIC ACID: CPT | Performed by: INTERNAL MEDICINE

## 2021-11-02 PROCEDURE — 36600 WITHDRAWAL OF ARTERIAL BLOOD: CPT

## 2021-11-02 PROCEDURE — 82803 BLOOD GASES ANY COMBINATION: CPT

## 2021-11-02 PROCEDURE — 93308 TTE F-UP OR LMTD: CPT | Performed by: INTERNAL MEDICINE

## 2021-11-02 PROCEDURE — 93005 ELECTROCARDIOGRAM TRACING: CPT | Performed by: INTERNAL MEDICINE

## 2021-11-02 PROCEDURE — 0 MILRINONE LACTATE IN DEXTROSE 20-5 MG/100ML-% SOLUTION: Performed by: INTERNAL MEDICINE

## 2021-11-02 PROCEDURE — 94760 N-INVAS EAR/PLS OXIMETRY 1: CPT

## 2021-11-02 PROCEDURE — 94003 VENT MGMT INPAT SUBQ DAY: CPT

## 2021-11-02 PROCEDURE — 0 POTASSIUM CHLORIDE 10 MEQ/100ML SOLUTION: Performed by: INTERNAL MEDICINE

## 2021-11-02 PROCEDURE — 93010 ELECTROCARDIOGRAM REPORT: CPT | Performed by: INTERNAL MEDICINE

## 2021-11-02 PROCEDURE — 94799 UNLISTED PULMONARY SVC/PX: CPT

## 2021-11-02 PROCEDURE — P9047 ALBUMIN (HUMAN), 25%, 50ML: HCPCS | Performed by: INTERNAL MEDICINE

## 2021-11-02 PROCEDURE — 25010000002 PIPERACILLIN SOD-TAZOBACTAM PER 1 G: Performed by: INTERNAL MEDICINE

## 2021-11-02 PROCEDURE — 85730 THROMBOPLASTIN TIME PARTIAL: CPT | Performed by: INTERNAL MEDICINE

## 2021-11-02 PROCEDURE — 82565 ASSAY OF CREATININE: CPT | Performed by: INTERNAL MEDICINE

## 2021-11-02 PROCEDURE — 99233 SBSQ HOSP IP/OBS HIGH 50: CPT | Performed by: INTERNAL MEDICINE

## 2021-11-02 PROCEDURE — 02PA3RZ REMOVAL OF SHORT-TERM EXTERNAL HEART ASSIST SYSTEM FROM HEART, PERCUTANEOUS APPROACH: ICD-10-PCS | Performed by: INTERNAL MEDICINE

## 2021-11-02 PROCEDURE — 82330 ASSAY OF CALCIUM: CPT | Performed by: INTERNAL MEDICINE

## 2021-11-02 PROCEDURE — 33992 RMVL PERQ LEFT HEART VAD: CPT | Performed by: INTERNAL MEDICINE

## 2021-11-02 PROCEDURE — C1760 CLOSURE DEV, VASC: HCPCS | Performed by: INTERNAL MEDICINE

## 2021-11-02 PROCEDURE — 85384 FIBRINOGEN ACTIVITY: CPT | Performed by: INTERNAL MEDICINE

## 2021-11-02 PROCEDURE — 80053 COMPREHEN METABOLIC PANEL: CPT | Performed by: INTERNAL MEDICINE

## 2021-11-02 RX ORDER — HEPARIN SODIUM 5000 [USP'U]/ML
40-80 INJECTION, SOLUTION INTRAVENOUS; SUBCUTANEOUS EVERY 6 HOURS PRN
Status: DISCONTINUED | OUTPATIENT
Start: 2021-11-02 | End: 2021-11-03

## 2021-11-02 RX ORDER — LIDOCAINE HYDROCHLORIDE 20 MG/ML
INJECTION, SOLUTION INFILTRATION; PERINEURAL AS NEEDED
Status: DISCONTINUED | OUTPATIENT
Start: 2021-11-02 | End: 2021-11-02 | Stop reason: HOSPADM

## 2021-11-02 RX ORDER — HEPARIN SODIUM 10000 [USP'U]/100ML
18 INJECTION, SOLUTION INTRAVENOUS
Status: DISCONTINUED | OUTPATIENT
Start: 2021-11-02 | End: 2021-11-03

## 2021-11-02 RX ORDER — HEPARIN SODIUM 5000 [USP'U]/ML
40-80 INJECTION, SOLUTION INTRAVENOUS; SUBCUTANEOUS EVERY 6 HOURS PRN
Status: DISCONTINUED | OUTPATIENT
Start: 2021-11-02 | End: 2021-11-02

## 2021-11-02 RX ORDER — HEPARIN SODIUM 10000 [USP'U]/100ML
18 INJECTION, SOLUTION INTRAVENOUS
Status: DISCONTINUED | OUTPATIENT
Start: 2021-11-02 | End: 2021-11-02

## 2021-11-02 RX ORDER — HEPARIN SODIUM 5000 [USP'U]/ML
80 INJECTION, SOLUTION INTRAVENOUS; SUBCUTANEOUS ONCE
Status: DISCONTINUED | OUTPATIENT
Start: 2021-11-02 | End: 2021-11-02

## 2021-11-02 RX ORDER — SODIUM CHLORIDE 9 MG/ML
250 INJECTION, SOLUTION INTRAVENOUS ONCE AS NEEDED
Status: DISCONTINUED | OUTPATIENT
Start: 2021-11-02 | End: 2021-11-08

## 2021-11-02 RX ADMIN — PANTOPRAZOLE SODIUM 40 MG: 40 INJECTION, POWDER, FOR SOLUTION INTRAVENOUS at 08:33

## 2021-11-02 RX ADMIN — ALBUMIN HUMAN 12.5 G: 0.25 SOLUTION INTRAVENOUS at 06:36

## 2021-11-02 RX ADMIN — POTASSIUM CHLORIDE 10 MEQ: 7.46 INJECTION, SOLUTION INTRAVENOUS at 04:21

## 2021-11-02 RX ADMIN — POTASSIUM CHLORIDE 10 MEQ: 7.46 INJECTION, SOLUTION INTRAVENOUS at 14:26

## 2021-11-02 RX ADMIN — CHLORHEXIDINE GLUCONATE 15 ML: 1.2 RINSE ORAL at 08:33

## 2021-11-02 RX ADMIN — METOPROLOL TARTRATE 25 MG: 25 TABLET, FILM COATED ORAL at 12:51

## 2021-11-02 RX ADMIN — CHLORHEXIDINE GLUCONATE 15 ML: 1.2 RINSE ORAL at 20:06

## 2021-11-02 RX ADMIN — METOPROLOL TARTRATE 25 MG: 25 TABLET, FILM COATED ORAL at 20:04

## 2021-11-02 RX ADMIN — POTASSIUM CHLORIDE 10 MEQ: 7.46 INJECTION, SOLUTION INTRAVENOUS at 12:41

## 2021-11-02 RX ADMIN — SODIUM CHLORIDE 125 ML/HR: 9 INJECTION, SOLUTION INTRAVENOUS at 00:43

## 2021-11-02 RX ADMIN — POTASSIUM CHLORIDE 10 MEQ: 7.46 INJECTION, SOLUTION INTRAVENOUS at 20:07

## 2021-11-02 RX ADMIN — SODIUM CHLORIDE, PRESERVATIVE FREE 10 ML: 5 INJECTION INTRAVENOUS at 20:06

## 2021-11-02 RX ADMIN — POTASSIUM CHLORIDE 10 MEQ: 7.46 INJECTION, SOLUTION INTRAVENOUS at 17:58

## 2021-11-02 RX ADMIN — POTASSIUM CHLORIDE 10 MEQ: 7.46 INJECTION, SOLUTION INTRAVENOUS at 05:26

## 2021-11-02 RX ADMIN — PROPOFOL 30 MCG/KG/MIN: 10 INJECTION, EMULSION INTRAVENOUS at 00:02

## 2021-11-02 RX ADMIN — TAZOBACTAM SODIUM AND PIPERACILLIN SODIUM 3.38 G: 375; 3 INJECTION, SOLUTION INTRAVENOUS at 04:15

## 2021-11-02 RX ADMIN — SODIUM CHLORIDE, PRESERVATIVE FREE 10 ML: 5 INJECTION INTRAVENOUS at 09:07

## 2021-11-02 RX ADMIN — MILRINONE LACTATE IN DEXTROSE 0.25 MCG/KG/MIN: 200 INJECTION, SOLUTION INTRAVENOUS at 12:43

## 2021-11-02 RX ADMIN — PROPOFOL 30 MCG/KG/MIN: 10 INJECTION, EMULSION INTRAVENOUS at 04:29

## 2021-11-02 NOTE — PROGRESS NOTES
"      [facility]         Dr Hicks Sayied   LOS: 4 days   Patient Care Team:  Provider, No Known as PCP - General    Chief Complaint: Acute liver failure suspected salicylate toxicity with cardiogenic shock coagulopathy lactic acidosis thrombocytopenia multiple issues ongoing    Interval History: Awake following simple commands this morning.  On small dose of propofol and IV milrinone ongoing.  Mental status much improved this morning.  Tube feeds currently ongoing at [employer] [facility].    REVIEW OF SYSTEMS:   Unable to get with patient's current condition sedated intubated    Ventilator/Non-Invasive Ventilation Settings (From admission, onward)             Start     Ordered    10/30/21 0555  Ventilator - AC/VC; (16); 30; 10; 550  Continuous        Question Answer Comment   Vent Mode AC/VC    Breath rate  16   FiO2 30    PEEP 10    Tidal Volume 550        10/30/21 0554                  Vital Signs  Temp:  [97.34 °F (36.3 °C)-98.7 °F (37.1 °C)] 98.06 °F (36.7 °C)  Heart Rate:  [] 103  Resp:  [12-14] 14  BP: ()/() 141/83  Arterial Line BP: ()/() 93/71  FiO2 (%):  [30 %] 30 %    Intake/Output Summary (Last 24 hours) at 11/2/2021 0950  Last data filed at 11/2/2021 0946  Gross per 24 hour   Intake 5031.86 ml   Output 9835 ml   Net -4803.14 ml     Flowsheet Rows      First Filed Value   Admission Height 175.3 cm (69\") Documented at 10/29/2021 1717   Admission Weight 99.8 kg (220 lb) Documented at 10/29/2021 1433          Physical Exam:  Patient is examined using the personal protective equipment as per guidelines from infection control for this particular patient as enacted.  Hand hygiene was performed before and after patient interaction.   General Appearance:   Sedated intubated.  Following simple commands this morning.  ET tube good position orally.   Neck midline trachea, no thyromegaly   Lungs:    Equal breath sounds on the vent    Heart:    Regular rhythm and normal rate, " normal S1 and S2, no            murmur, no gallop, no rub, no click   Chest Wall:    No abnormalities observed   Abdomen:    Soft no masses felt   Extremities:  1+ edema, no cyanosis, no             redness.  Perfusion of lower extremity has improved  CNS sedated intubated.  Moving all extremities following simple commands  Skin no rashes no nodules  Musculoskeletal no cyanosis no clubbing normal range of motion     Results Review:        Results from last 7 days   Lab Units 11/02/21 0403 11/02/21  0009 11/01/21 2135 11/01/21  1522 11/01/21  0426 11/01/21  0426 10/31/21  0825 10/31/21  0250   SODIUM mmol/L 142  --   --   --   --  139  --  138   POTASSIUM mmol/L 3.6  --  3.6  --   --  3.4*   < > 3.0*   CHLORIDE mmol/L 102  --   --   --   --  103  --  100   CO2 mmol/L 28.4  --   --   --   --  26.5  --  24.6   BUN mg/dL 30*  --   --   --   --  37*  --  40*   CREATININE mg/dL 1.63* 1.66*  --  1.89*   < > 1.85*  1.85*   < > 2.15*   GLUCOSE mg/dL 108*  --   --   --    < > 94  --  102*   CALCIUM mg/dL 8.0*  --   --   --   --  7.4*  --  7.2*    < > = values in this interval not displayed.     Results from last 7 days   Lab Units 10/31/21  1527 10/31/21  0825 10/30/21  2227 10/30/21  1719 10/29/21  1247   CK TOTAL U/L 339* 347* 351*   < >  --    TROPONIN T ng/mL  --   --   --   --  0.038*    < > = values in this interval not displayed.     Results from last 7 days   Lab Units 11/02/21 0403 11/01/21 1522 11/01/21 0426   WBC 10*3/mm3 7.01 8.97 7.52   HEMOGLOBIN g/dL 13.0 13.9 12.2*   HEMATOCRIT % 39.9 41.9 35.5*   PLATELETS 10*3/mm3 56* 66* 64*     Results from last 7 days   Lab Units 11/02/21  0558 11/01/21  1522 11/01/21  0426   INR  1.29* 1.32* 1.65*   APTT seconds 42.2* 36.9* 53.4*         Results from last 7 days   Lab Units 10/31/21  0250   MAGNESIUM mg/dL 2.1     Results from last 7 days   Lab Units 11/02/21  0403   TRIGLYCERIDES mg/dL 102     Results from last 7 days   Lab Units 11/02/21  0843   PH, ARTERIAL pH  units 7.448   PO2 ART mm Hg 95.1   PCO2, ARTERIAL mm Hg 46.1*   HCO3 ART mmol/L 31.9*       I reviewed the patient's new clinical results.  I personally viewed and interpreted the patient's chest x-ray.        Medication Review:   chlorhexidine, 15 mL, Mouth/Throat, Q12H  pantoprazole, 40 mg, Intravenous, Daily  piperacillin-tazobactam, 3.375 g, Intravenous, Q8H  sodium chloride, 10 mL, Intravenous, Q12H        heparin in 500 mL dextrose 5% in water (Impella), 14 mL/hr, Last Rate: 14 mL/hr (10/31/21 1256)  milrinone, 0.25-0.75 mcg/kg/min, Last Rate: 0.25 mcg/kg/min (11/01/21 2143)  norepinephrine, 0.02-0.3 mcg/kg/min, Last Rate: Stopped (11/01/21 0745)  phenylephrine, 0.5-3 mcg/kg/min, Last Rate: Stopped (10/30/21 0545)  propofol, 5-50 mcg/kg/min, Last Rate: 15 mcg/kg/min (11/02/21 0515)        ASSESSMENT:   1. Acute liver failure: Suspect salicylate toxicity per history, subacute/acute on chronic.  2. Acute cardiogenic shock  3. Acute respiratory failure on vent  4. A. fib with RVR  5. KRISHNA: Mostly volume depletion due to liver failure  6. Electrolytes disturbance: Acute life-threatening hyperkalemia.  Hyponatremia.  7. Coagulopathy, secondary to acute liver disease  8. Lactic acidosis, secondary to liver failure  9. Slightly evaded troponin: Likely secondary to KRISHNA  10. Erythrocytosis, likely more hemoconcentration  11. Bilateral pleural effusion, mild  12. Thrombocytopenia  13. Subcentimetric bilateral calcified nodules  14. Altered mental status  15. Catheter associated right IJ DVT      PLAN:  Significant improvement in mental status and currently on 30% FiO2 PEEP of 5.  Once Impella is out I will try spontaneous breathing trial and wean and possibly extubate from the vent.  Currently in cardiogenic shock with [place] in place.  Impella management per cardiology.  Plans noted for possible weaning off the Impella today.  Cardiac output has improved.  Milrinone drip per cardiology  Improving kidney function and  hyperkalemia now resolved.  Appreciate input from nephrology  Coagulopathy with thrombocytopenia.  Platelet counts gradually decreasing.  Currently on heparin drip for Impella.  Hematology currently following.    Lactic acidosis improving with current supportive care  Electrolyte management per nephrology.  Creatinine stable and improving  I think this was all cardiogenic shock with CHF.  I will discontinue antibiotics  ICU core measures.  Tube feeds to be continued if unable to be extubated then will advance to goals  Heparin drip for DVT.  Will recommend discontinuing [place]    Critical care time 35 minutes      Kurt Evangelista MD  11/02/21  09:50 EDT

## 2021-11-02 NOTE — PROGRESS NOTES
[facility]  Inpatient Progress Note    Reason for Follow Up: Elevated LFTs    Subjective     Interval History:   Transaminase elevation still showing marked improvement.  Suspect this was associated with a shock liver and is in a recovery phase.  Discussed with RN and family at bedside.  Tolerating trickle tube feeds.    Current Facility-Administered Medications:   •  atropine sulfate injection 0.5 mg, 0.5 mg, Intravenous, Q5 Min PRN, Get Cunha MD  •  chlorhexidine (PERIDEX) 0.12 % solution 15 mL, 15 mL, Mouth/Throat, Q12H, Kurt Evangelista MD, 15 mL at 11/02/21 0833  •  fentaNYL citrate (PF) (SUBLIMAZE) injection 50 mcg, 50 mcg, Intravenous, Q30 Min PRN, Get Cunha MD, 50 mcg at 11/01/21 1242  •  heparin (porcine) 25,000 Units in dextrose (D5W) 5 % 500 mL infusion, 14 mL/hr, Intra-arterial, Continuous, Get Cunha MD, Last Rate: 14 mL/hr at 10/31/21 1256, 14 mL/hr at 10/31/21 1256  •  HYDROcodone-acetaminophen (NORCO) 5-325 MG per tablet 1 tablet, 1 tablet, Oral, Q4H PRN, Get Cunha MD  •  milrinone (PRIMACOR) 20 mg in 100 mL D5W infusion, 0.25-0.75 mcg/kg/min, Intravenous, Titrated, Get Cunha MD, Last Rate: 8.4 mL/hr at 11/01/21 2143, 0.25 mcg/kg/min at 11/01/21 2143  •  morphine injection 1 mg, 1 mg, Intravenous, Q4H PRN **AND** naloxone (NARCAN) injection 0.4 mg, 0.4 mg, Intravenous, Q5 Min PRN, Get Cunha MD  •  norepinephrine (LEVOPHED) 8 mg in 250 mL NS infusion (premix), 0.02-0.3 mcg/kg/min, Intravenous, Titrated, Get Cunha MD, Stopped at 11/01/21 0745  •  pantoprazole (PROTONIX) injection 40 mg, 40 mg, Intravenous, Daily, Get Cunha MD, 40 mg at 11/02/21 0833  •  phenylephrine (THONG-SYNEPHRINE) 50 mg in 250 mL NS infusion, 0.5-3 mcg/kg/min, Intravenous, Titrated, Get Cunha MD, Stopped at 10/30/21 0545  •  piperacillin-tazobactam (ZOSYN) 3.375 g in iso-osmotic dextrose 50 ml (premix), 3.375 g, Intravenous, Q8H, Get Cunha,  MD, 3.375 g at 11/02/21 0415  •  potassium chloride (K-DUR,KLOR-CON) ER tablet 40 mEq, 40 mEq, Oral, PRN **OR** potassium chloride (KLOR-CON) packet 40 mEq, 40 mEq, Oral, PRN, 40 mEq at 11/01/21 2224 **OR** potassium chloride 10 mEq in 100 mL IVPB, 10 mEq, Intravenous, Q1H PRN, Padmini Ma MD, Last Rate: 100 mL/hr at 11/02/21 0526, 10 mEq at 11/02/21 0526  •  propofol (DIPRIVAN) infusion 10 mg/mL 100 mL, 5-50 mcg/kg/min, Intravenous, Titrated, Kurt Evangelista MD, Last Rate: 8.98 mL/hr at 11/02/21 0515, 15 mcg/kg/min at 11/02/21 0515  •  sodium chloride 0.9 % flush 10 mL, 10 mL, Intravenous, PRN, Get Cunha MD  •  [COMPLETED] Insert peripheral IV, , , Once **AND** sodium chloride 0.9 % flush 10 mL, 10 mL, Intravenous, PRN, Get Cunha MD  •  sodium chloride 0.9 % flush 10 mL, 10 mL, Intravenous, Q12H, Get Cunha MD, 10 mL at 11/01/21 2117  Review of Systems:    Review of systems could not be obtained due to  patient intubated.    Objective     Vital Signs  Temp:  [97.16 °F (36.2 °C)-98.7 °F (37.1 °C)] 98.06 °F (36.7 °C)  Heart Rate:  [] 95  Resp:  [12-14] 14  BP: ()/() 118/77  Arterial Line BP: ()/() 93/71  FiO2 (%):  [30 %] 30 %  Body mass index is 36.41 kg/m².    Intake/Output Summary (Last 24 hours) at 11/2/2021 0839  Last data filed at 11/2/2021 0801  Gross per 24 hour   Intake 4958.15 ml   Output 9485 ml   Net -4526.85 ml     I/O this shift:  In: 477.3 [I.V.:454.3; NG/GT:23]  Out: 400 [Urine:400]     Physical Exam:   General: patient awake, alert and cooperative   Eyes: Normal lids and lashes, no scleral icterus   Neck: supple, normal ROM   Skin: warm and dry, not jaundiced   Cardiovascular: regular rhythm and rate, no murmurs auscultated   Pulm: clear to auscultation bilaterally, regular and unlabored   Abdomen: soft, nontender, nondistended; normal bowel sounds   Extremities: no rash or edema   Psychiatric: Normal mood and behavior; memory  intact     Results Review:     I reviewed the patient's new clinical results.    Results from last 7 days   Lab Units 11/02/21 0403 11/01/21 1522 11/01/21 0426   WBC 10*3/mm3 7.01 8.97 7.52   HEMOGLOBIN g/dL 13.0 13.9 12.2*   HEMATOCRIT % 39.9 41.9 35.5*   PLATELETS 10*3/mm3 56* 66* 64*     Results from last 7 days   Lab Units 11/02/21  0403 11/02/21  0009 11/01/21 2135 11/01/21 1522 11/01/21 0426 11/01/21  0426 10/31/21  0825 10/31/21  0250   SODIUM mmol/L 142  --   --   --   --  139  --  138   POTASSIUM mmol/L 3.6  --  3.6  --   --  3.4*   < > 3.0*   CHLORIDE mmol/L 102  --   --   --   --  103  --  100   CO2 mmol/L 28.4  --   --   --   --  26.5  --  24.6   BUN mg/dL 30*  --   --   --   --  37*  --  40*   CREATININE mg/dL 1.63* 1.66*  --  1.89*   < > 1.85*  1.85*   < > 2.15*   CALCIUM mg/dL 8.0*  --   --   --   --  7.4*  --  7.2*   BILIRUBIN mg/dL 5.4*  --   --   --   --  3.4*  --  3.9*   ALK PHOS U/L 62  --   --   --   --  54  --  47   ALT (SGPT) U/L 2,723*  --   --   --   --  3,451*  --  5,215*   AST (SGOT) U/L 985*  --   --   --   --  2,182*  --  >7,000*   GLUCOSE mg/dL 108*  --   --   --   --  94  --  102*    < > = values in this interval not displayed.     Results from last 7 days   Lab Units 11/02/21  0558 11/01/21 1522 11/01/21 0426   INR  1.29* 1.32* 1.65*     Lab Results   Lab Value Date/Time    LIPASE 32 10/29/2021 1247       Radiology:  XR Chest 1 View   Final Result   No significant interval change.       This report was finalized on 10/31/2021 5:16 AM by Dr. Henny Galvez M.D.          XR Chest 1 View   Final Result      XR Chest 1 View   Final Result      XR Chest 1 View   Final Result      CT Chest Without Contrast Diagnostic   Final Result   1. The findings suggest passive hepatic congestion with a tiny amount of   free fluid and 3rd spacing of fluid within the body wall. There are also   small-moderate-sized bilateral pleural effusions.   2. Moderately large left inguinal hernia  containing a long segment of   sigmoid colon and bladder dome without obstruction or incarceration.   3. Cardiomegaly.       Discussed with Dr. Gillespie.       This report was finalized on 10/29/2021 4:23 PM by Dr. Misti Georges M.D.          CT Abdomen Pelvis Without Contrast   Final Result   1. The findings suggest passive hepatic congestion with a tiny amount of   free fluid and 3rd spacing of fluid within the body wall. There are also   small-moderate-sized bilateral pleural effusions.   2. Moderately large left inguinal hernia containing a long segment of   sigmoid colon and bladder dome without obstruction or incarceration.   3. Cardiomegaly.       Discussed with Dr. Gillespie.       This report was finalized on 10/29/2021 4:23 PM by Dr. Misti Georges M.D.          XR Chest 1 View   Final Result   Subcentimeter bilateral calcified nodules are present   consistent with calcified granulomas. Heart size is enlarged. There is   no convincing evidence for active disease in the chest.       This report was finalized on 10/29/2021 12:27 PM by Dr. Kip Garcia M.D.              Assessment/Plan     Patient Active Problem List   Diagnosis   • Atrial fibrillation with rapid ventricular response (HCC)   • KRISHNA (acute kidney injury) (HCC)   • Hyperkalemia   • Right upper quadrant abdominal pain   • Elevated LFTs   • Mitral insufficiency, acute   • Cardiogenic shock (HCC)   • Coagulopathy (HCC)   • Thrombocytopenia (HCC)   • Other specified anemias   • Hypofibrinogenemia (HCC)       Assessment:  1. Elevated LFTs: Continued improvement as would be expected as liver recovers from shock event.  2. Nutrition: Core track tube feeding position with trickle feeds administered  3. Severe nonischemic cardiomyopathy      Plan:  · Continue to monitor LFTs  · Advance tube feeds as per nutrition's recommendations  I discussed the patients findings and my recommendations with family and nursing staff.    Rai Duncan,  MD

## 2021-11-02 NOTE — PLAN OF CARE
Pt remains in CCU on ventilator and impella device support. No alarms with device this shift, good cardiac output and stable numbers. Pt is following commands and opens eyes spontaneously. He is very pleasant and smiles at family and staff. Pt remains in rate controlled a-fib with some tachycardia. Great urine output this shift; 1 dose of lasix given. No bowel movement. Left arterial line pulled this shift per protocol, no pulsatile blood flow or accurate blood pressure reading on monitor. Family updated at bedside on status. Will continue to monitor.    Goal Outcome Evaluation:     Problem: Skin Injury Risk Increased  Goal: Skin Health and Integrity  Outcome: Ongoing, Progressing  Intervention: Optimize Skin Protection  Recent Flowsheet Documentation  Taken 11/2/2021 0400 by Rosalee Frederick RN  Pressure Reduction Techniques: weight shift assistance provided  Head of Bed (HOB): [facility] elevated  Pressure Reduction Devices: specialty bed utilized  Skin Protection:   adhesive use limited   incontinence pads utilized   tubing/devices free from skin contact   transparent dressing maintained  Taken 11/2/2021 0200 by Rosalee Frederick RN  Head of Bed (HOB): HOB elevated  Taken 11/2/2021 0002 by Rosalee Frederick RN  Head of Bed (HOB): HOB elevated  Taken 11/1/2021 2200 by Rosalee Frederick RN  Head of Bed (HOB): HOB elevated  Taken 11/1/2021 2000 by Rosalee Frederick RN  Pressure Reduction Techniques:   frequent weight shift encouraged   heels elevated off bed  Head of Bed (HOB): HOB elevated  Pressure Reduction Devices: specialty bed utilized  Skin Protection:   adhesive use limited   incontinence pads utilized   tubing/devices free from skin contact   transparent dressing maintained     Problem: Fall Injury Risk  Goal: Absence of Fall and Fall-Related Injury  Outcome: Ongoing, Progressing  Intervention: Identify and Manage Contributors to Fall Injury Risk  Recent Flowsheet Documentation  Taken 11/2/2021 0400 by Rosalee Frederick  RN  Medication Review/Management: medications reviewed  Taken 11/2/2021 0300 by Rosalee Frederick RN  Medication Review/Management: medications reviewed  Taken 11/2/2021 0200 by Rosalee Frederick RN  Medication Review/Management: medications reviewed  Taken 11/2/2021 0100 by Rosalee Frederick RN  Medication Review/Management: medications reviewed  Taken 11/2/2021 0002 by Rosalee Frederick RN  Medication Review/Management: medications reviewed  Taken 11/1/2021 2300 by Rosalee Frederick RN  Medication Review/Management: medications reviewed  Taken 11/1/2021 2200 by Rosalee Frederick RN  Medication Review/Management: medications reviewed  Taken 11/1/2021 2100 by Rosalee Frederick RN  Medication Review/Management: medications reviewed  Taken 11/1/2021 2000 by Rosalee Frederick RN  Medication Review/Management: medications reviewed  Intervention: Promote Injury-Free Environment  Recent Flowsheet Documentation  Taken 11/2/2021 0400 by Rosalee Frederick RN  Safety Promotion/Fall Prevention: safety round/check completed  Taken 11/2/2021 0300 by Rosalee Frederick RN  Safety Promotion/Fall Prevention: safety round/check completed  Taken 11/2/2021 0200 by Rosalee Frederick RN  Safety Promotion/Fall Prevention: safety round/check completed  Taken 11/2/2021 0100 by Rosalee Frederick RN  Safety Promotion/Fall Prevention: safety round/check completed  Taken 11/2/2021 0002 by Rosalee Frederick RN  Safety Promotion/Fall Prevention: safety round/check completed  Taken 11/1/2021 2300 by Rosalee Frederick RN  Safety Promotion/Fall Prevention: safety round/check completed  Taken 11/1/2021 2200 by Rosalee Frederick RN  Safety Promotion/Fall Prevention: safety round/check completed  Taken 11/1/2021 2100 by Rosalee Frederick RN  Safety Promotion/Fall Prevention: safety round/check completed  Taken 11/1/2021 2000 by Rosalee Frederick RN  Safety Promotion/Fall Prevention: safety round/check completed     Problem: Restraint, Nonbehavioral (Nonviolent)  Goal: Discontinuation  Criteria Achieved  Outcome: Ongoing, Progressing  Intervention: Implement Least-restrictive Safety Strategies  Recent Flowsheet Documentation  Taken 11/2/2021 0400 by Rosalee Frederick RN  Medical Device Protection: IV pole/bag removed from visual field  Taken 11/2/2021 0300 by Rosalee Frederick RN  Medical Device Protection: IV pole/bag removed from visual field  Taken 11/2/2021 0200 by Rosalee Frederick RN  Medical Device Protection: IV pole/bag removed from visual field  Taken 11/2/2021 0100 by Rosalee Frederick RN  Medical Device Protection: IV pole/bag removed from visual field  Taken 11/2/2021 0002 by Rosalee Frederick RN  Medical Device Protection: IV pole/bag removed from visual field  Taken 11/1/2021 2300 by Rosalee Frederick RN  Medical Device Protection: IV pole/bag removed from visual field  Taken 11/1/2021 2200 by Rosalee Frederick RN  Medical Device Protection: IV pole/bag removed from visual field  Taken 11/1/2021 2100 by Rosalee Frederick RN  Medical Device Protection: IV pole/bag removed from visual field  Taken 11/1/2021 2000 by Rosalee Frederick RN  Medical Device Protection: IV pole/bag removed from visual field  Goal: Personal Dignity and Safety Maintained  Outcome: Ongoing, Progressing  Intervention: Protect Dignity, Rights, and Personal Wellbeing  Recent Flowsheet Documentation  Taken 11/2/2021 0400 by Rosalee Frederick RN  Trust Relationship/Rapport:   care explained   emotional support provided   reassurance provided  Taken 11/2/2021 0002 by Rosalee Frederick RN  Trust Relationship/Rapport:   care explained   emotional support provided   reassurance provided  Taken 11/1/2021 2000 by Rosalee Frederick RN  Trust Relationship/Rapport:   care explained   emotional support provided   reassurance provided  Intervention: Protect Skin and Joint Integrity  Recent Flowsheet Documentation  Taken 11/2/2021 0400 by Rosalee Frederick RN  Body Position:   turned   side-lying, right   reverse Trendelenburg  Taken 11/2/2021 0200 by  Rosalee Frederick RN  Body Position:   reverse Trendelenburg   weight shift assistance provided  Taken 11/2/2021 0002 by Rosalee Frederick RN  Body Position:   weight shift assistance provided   turned   tilted, left   reverse Trendelenburg  Taken 11/1/2021 2200 by Rosalee Frederick RN  Body Position: weight shift assistance provided  Taken 11/1/2021 2000 by Rosalee Frederick RN  Body Position:   weight shift assistance provided   turned   tilted, right  Range of Motion: ROM (range of motion) performed     Problem: Adult Inpatient Plan of Care  Goal: Plan of Care Review  Outcome: Ongoing, Progressing  Goal: Patient-Specific Goal (Individualized)  Outcome: Ongoing, Progressing  Goal: Absence of Hospital-Acquired Illness or Injury  Outcome: Ongoing, Progressing  Intervention: Identify and Manage Fall Risk  Recent Flowsheet Documentation  Taken 11/2/2021 0400 by Rosalee Frederick RN  Safety Promotion/Fall Prevention: safety round/check completed  Taken 11/2/2021 0300 by Rosalee Frederick RN  Safety Promotion/Fall Prevention: safety round/check completed  Taken 11/2/2021 0200 by Rosalee Frederick RN  Safety Promotion/Fall Prevention: safety round/check completed  Taken 11/2/2021 0100 by Rosalee Frederick RN  Safety Promotion/Fall Prevention: safety round/check completed  Taken 11/2/2021 0002 by Rosalee Frederick RN  Safety Promotion/Fall Prevention: safety round/check completed  Taken 11/1/2021 2300 by Rosalee Frederick RN  Safety Promotion/Fall Prevention: safety round/check completed  Taken 11/1/2021 2200 by Rosalee Frederick RN  Safety Promotion/Fall Prevention: safety round/check completed  Taken 11/1/2021 2100 by Rosalee Frederick RN  Safety Promotion/Fall Prevention: safety round/check completed  Taken 11/1/2021 2000 by Rosalee Frederick RN  Safety Promotion/Fall Prevention: safety round/check completed  Intervention: Prevent Skin Injury  Recent Flowsheet Documentation  Taken 11/2/2021 0400 by Rosalee Frederick RN  Body Position:   turned    side-lying, right   reverse Trendelenburg  Skin Protection:   adhesive use limited   incontinence pads utilized   tubing/devices free from skin contact   transparent dressing maintained  Taken 11/2/2021 0200 by Rosalee Frederick RN  Body Position:   reverse Trendelenburg   weight shift assistance provided  Taken 11/2/2021 0002 by Rosalee Frederick RN  Body Position:   weight shift assistance provided   turned   tilted, left   reverse Trendelenburg  Taken 11/1/2021 2200 by Rosalee Frederick RN  Body Position: weight shift assistance provided  Taken 11/1/2021 2000 by Rosalee Frederick RN  Body Position:   weight shift assistance provided   turned   tilted, right  Skin Protection:   adhesive use limited   incontinence pads utilized   tubing/devices free from skin contact   transparent dressing maintained  Intervention: Prevent and Manage VTE (venous thromboembolism) Risk  Recent Flowsheet Documentation  Taken 11/2/2021 0400 by Rosalee Frederick RN  VTE Prevention/Management:   left   sequential compression devices on   bleeding risk factor(s) identified  Taken 11/2/2021 0002 by Rosalee Frederick RN  VTE Prevention/Management:   left   sequential compression devices on   bleeding risk factor(s) identified  Taken 11/1/2021 2000 by Rosalee Frederick RN  VTE Prevention/Management:   bleeding risk factor(s) identified   left   sequential compression devices on  Intervention: Prevent Infection  Recent Flowsheet Documentation  Taken 11/2/2021 0400 by Rosalee Frederick RN  Infection Prevention: rest/sleep promoted  Taken 11/2/2021 0300 by Rosalee Frederick RN  Infection Prevention: rest/sleep promoted  Taken 11/2/2021 0200 by Rosalee Frederick RN  Infection Prevention: rest/sleep promoted  Taken 11/2/2021 0100 by Rosalee Frederick RN  Infection Prevention: rest/sleep promoted  Taken 11/2/2021 0002 by Rosalee Frederick RN  Infection Prevention: rest/sleep promoted  Taken 11/1/2021 2300 by Rosalee Frederick RN  Infection Prevention: rest/sleep  promoted  Taken 11/1/2021 2200 by Rosalee Frederick RN  Infection Prevention: rest/sleep promoted  Taken 11/1/2021 2100 by Rosalee Frederick RN  Infection Prevention: rest/sleep promoted  Taken 11/1/2021 2000 by Rosalee Frederick RN  Infection Prevention: rest/sleep promoted  Goal: Optimal Comfort and Wellbeing  Outcome: Ongoing, Progressing  Intervention: Provide Person-Centered Care  Recent Flowsheet Documentation  Taken 11/2/2021 0400 by Rosalee Frederick RN  Trust Relationship/Rapport:   care explained   emotional support provided   reassurance provided  Taken 11/2/2021 0002 by Rosalee Frederick RN  Trust Relationship/Rapport:   care explained   emotional support provided   reassurance provided  Taken 11/1/2021 2000 by Rosalee Frederick RN  Trust Relationship/Rapport:   care explained   emotional support provided   reassurance provided  Goal: Readiness for Transition of Care  Outcome: Ongoing, Progressing     Problem: Adjustment to Device (Ventricular Assist Device)  Goal: Optimal Adjustment to Device  Outcome: Ongoing, Progressing  Intervention: Optimize Psychosocial Response to VAD Implantation  Recent Flowsheet Documentation  Taken 11/1/2021 2000 by Rosalee Frederick RN  Supportive Measures: relaxation techniques promoted  Family/Support System Care:   support provided   involvement promoted   presence promoted     Problem: Bleeding (Ventricular Assist Device)  Goal: Absence of Bleeding  Outcome: Ongoing, Progressing     Problem: Embolism (Ventricular Assist Device)  Goal: Absence of Embolism Signs and Symptoms  Outcome: Ongoing, Progressing  Intervention: Prevent and Manage Embolism Risk/Embolism  Recent Flowsheet Documentation  Taken 11/2/2021 0400 by Rosalee Frederick RN  VTE Prevention/Management:   left   sequential compression devices on   bleeding risk factor(s) identified  Taken 11/2/2021 0002 by Rosalee Frederick RN  VTE Prevention/Management:   left   sequential compression devices on   bleeding risk factor(s)  identified  Taken 11/1/2021 2000 by Rosalee Frederick RN  VTE Prevention/Management:   bleeding risk factor(s) identified   left   sequential compression devices on     Problem: Hemodynamic Instability (Ventricular Assist Device)  Goal: Optimal Blood Flow  Outcome: Ongoing, Progressing     Problem: Infection (Ventricular Assist Device)  Goal: Absence of Infection Signs and Symptoms  Outcome: Ongoing, Progressing  Intervention: Prevent or Manage Infection  Recent Flowsheet Documentation  Taken 11/2/2021 0400 by Rosalee Frederick RN  Infection Prevention: rest/sleep promoted  Taken 11/2/2021 0300 by Rosalee Frederick RN  Infection Prevention: rest/sleep promoted  Taken 11/2/2021 0200 by Rosalee Frederick RN  Infection Prevention: rest/sleep promoted  Taken 11/2/2021 0100 by Rosalee Frederick RN  Infection Prevention: rest/sleep promoted  Taken 11/2/2021 0002 by Rosalee Frederick RN  Infection Prevention: rest/sleep promoted  Taken 11/1/2021 2300 by Rosalee Frederick RN  Infection Prevention: rest/sleep promoted  Taken 11/1/2021 2200 by Rosalee Frederick RN  Infection Prevention: rest/sleep promoted  Taken 11/1/2021 2100 by Rosalee Frederick RN  Infection Prevention: rest/sleep promoted  Taken 11/1/2021 2000 by Rosalee Frederick RN  Infection Prevention: rest/sleep promoted     Problem: Communication Impairment (Mechanical Ventilation, Invasive)  Goal: Effective Communication  Outcome: Ongoing, Progressing  Intervention: Ensure Effective Communication  Recent Flowsheet Documentation  Taken 11/2/2021 0400 by Rosalee Frederick RN  Communication Enhancement Strategies:   one-step directions provided   repetition utilized  Taken 11/2/2021 0002 by Rosalee Frederick RN  Communication Enhancement Strategies:   repetition utilized   one-step directions provided  Taken 11/1/2021 2000 by Rosalee Frederick RN  Communication Enhancement Strategies:   one-step directions provided   repetition utilized     Problem: Device-Related Complication Risk  (Mechanical Ventilation, Invasive)  Goal: Optimal Device Function  Outcome: Ongoing, Progressing  Intervention: Optimize Device Care and Function  Recent Flowsheet Documentation  Taken 11/2/2021 0400 by Rosalee Frederick RN  Airway Safety Measures:   mask at bedside   manual resuscitator at bedside   manual resuscitator/mask/valve in room   suction at bedside  Taken 11/2/2021 0300 by Rosalee Frederick RN  Airway Safety Measures:   mask at bedside   manual resuscitator at bedside   manual resuscitator/mask/valve in room   suction at bedside  Taken 11/2/2021 0200 by Rosalee Frederick RN  Airway Safety Measures:   mask at bedside   manual resuscitator at bedside   manual resuscitator/mask/valve in room   suction at bedside  Taken 11/2/2021 0100 by Rosalee Frederick RN  Airway Safety Measures:   mask at bedside   manual resuscitator at bedside   manual resuscitator/mask/valve in room   suction at bedside  Taken 11/2/2021 0002 by Rosalee Frederick RN  Airway Safety Measures:   mask at bedside   manual resuscitator at bedside   manual resuscitator/mask/valve in room   suction at bedside  Taken 11/1/2021 2300 by Rosalee Frederick RN  Airway Safety Measures:   mask at bedside   manual resuscitator at bedside   manual resuscitator/mask/valve in room   suction at bedside  Taken 11/1/2021 2200 by Rosalee Frederick RN  Airway Safety Measures:   mask at bedside   manual resuscitator at bedside   manual resuscitator/mask/valve in room   suction at bedside  Taken 11/1/2021 2100 by Rosalee Frederick RN  Airway Safety Measures:   mask at bedside   manual resuscitator at bedside   manual resuscitator/mask/valve in room   suction at bedside  Taken 11/1/2021 2000 by Rosalee Frederick RN  Airway Safety Measures:   mask at bedside   manual resuscitator at bedside   suction at bedside   manual resuscitator/mask/valve in room     Problem: Inability to Wean (Mechanical Ventilation, Invasive)  Goal: Mechanical Ventilation Liberation  Outcome: Ongoing,  Progressing  Intervention: Promote Extubation and Mechanical Ventilation Liberation  Recent Flowsheet Documentation  Taken 11/2/2021 0400 by Rosalee Frederick RN  Medication Review/Management: medications reviewed  Taken 11/2/2021 0300 by Rosalee Frederick RN  Medication Review/Management: medications reviewed  Taken 11/2/2021 0200 by Rosalee Frederick RN  Medication Review/Management: medications reviewed  Taken 11/2/2021 0100 by Rosalee Frederick RN  Medication Review/Management: medications reviewed  Taken 11/2/2021 0002 by Rosalee Frederick RN  Environmental Support: calm environment promoted  Medication Review/Management: medications reviewed  Taken 11/1/2021 2300 by Rosalee Frederick RN  Medication Review/Management: medications reviewed  Taken 11/1/2021 2200 by Rosalee Frederick RN  Medication Review/Management: medications reviewed  Taken 11/1/2021 2100 by Rosalee Frederick RN  Medication Review/Management: medications reviewed  Taken 11/1/2021 2000 by Rosalee Frederick RN  Environmental Support:   calm environment promoted   environmental consistency promoted  Medication Review/Management: medications reviewed     Problem: Nutrition Impairment (Mechanical Ventilation, Invasive)  Goal: Optimal Nutrition Delivery  Outcome: Ongoing, Progressing     Problem: Skin and Tissue Injury (Mechanical Ventilation, Invasive)  Goal: Absence of Device-Related Skin and Tissue Injury  Outcome: Ongoing, Progressing  Intervention: Maintain Skin and Tissue Health  Recent Flowsheet Documentation  Taken 11/2/2021 0400 by Rosalee Frederick RN  Device Skin Pressure Protection:   absorbent pad utilized/changed   adhesive use limited  Taken 11/1/2021 2000 by Rosalee Frederick RN  Device Skin Pressure Protection:   absorbent pad utilized/changed   adhesive use limited     Problem: Ventilator-Induced Lung Injury (Mechanical Ventilation, Invasive)  Goal: Absence of Ventilator-Induced Lung Injury  Outcome: Ongoing, Progressing  Intervention: Facilitate  Lung-Protection Measures  Recent Flowsheet Documentation  Taken 11/2/2021 0400 by Rosalee Frederick RN  Lung Protection Measures:   fluid excess minimized   ventilator waveforms monitored   ventilator synchrony promoted   plateau/inspiratory pressure monitored   lung compliance monitored  Taken 11/2/2021 0002 by Rosalee Frederick RN  Lung Protection Measures:   fluid excess minimized   lung compliance monitored   plateau/inspiratory pressure monitored   ventilator synchrony promoted   ventilator waveforms monitored  Taken 11/1/2021 2000 by Rosalee Frederick RN  Lung Protection Measures:   fluid excess minimized   lung compliance monitored   plateau/inspiratory pressure monitored   ventilator synchrony promoted   ventilator waveforms monitored  Intervention: Prevent Ventilator-Associated Pneumonia  Recent Flowsheet Documentation  Taken 11/2/2021 0400 by Rosalee Frederick RN  Head of Bed ([facility]): HOB elevated  VAP Prevention Bundle:   HOB elevation maintained   oral care regularly provided   readiness to extubate assessed   VTE prophylaxis provided   vent circuit breaks minimized   stress ulcer prophylaxis provided  VAP Prevention Measures: completed  Oral Care:   teeth brushed   tongue brushed   swabbed with antiseptic solution   lip lubricant applied  Taken 11/2/2021 0200 by Rosalee Frederick RN  Head of Bed ([facility]): HOB elevated  Taken 11/2/2021 0002 by Rosalee Ferderick RN  Head of Bed ([facility]): HOB elevated  VAP Prevention Bundle:   HOB elevation maintained   oral care regularly provided   readiness to extubate assessed   stress ulcer prophylaxis provided   vent circuit breaks minimized   VTE prophylaxis provided  VAP Prevention Measures: completed  Taken 11/1/2021 2200 by Rosalee Frederick RN  Head of Bed ([facility]): HOB elevated  Taken 11/1/2021 2000 by Rosalee rFederick RN  Head of Bed ([facility]): HOB elevated  VAP Prevention Bundle:   HOB elevation maintained   oral care regularly provided   readiness to extubate assessed   stress ulcer  prophylaxis provided   vent circuit breaks minimized   VTE prophylaxis provided  VAP Prevention Measures: completed

## 2021-11-02 NOTE — PROGRESS NOTES
[facility] Progress Note      Patient Name: Denis Chavez  : 1965  MRN: [id number]  Primary Care Physician:  Provider, No Known  Date of admission: 10/29/2021    Subjective     Interval History:   Follow up KRISHNA.  UOP 9 liters.  On vent.  On multiple drips.  Impella.  CVP unable to be measured  Per RN.   Trickle tube feeds.  Heart rate better after digoxin load.  Denies pain. Not hot or cold. Not nauseated.     Review of Systems:   As noted above    Objective     Vitals:   Temp:  [96.98 °F (36.1 °C)-98.7 °F (37.1 °C)] 97.9 °F (36.6 °C)  Heart Rate:  [] 107  Resp:  [12-14] 12  BP: ()/() 107/93  Arterial Line BP: ()/() 93/71  FiO2 (%):  [30 %] 30 %    Intake/Output Summary (Last 24 hours) at 2021 0738  Last data filed at 2021 0600  Gross per 24 hour   Intake 4480.84 ml   Output 9097 ml   Net -4616.16 ml       Physical Exam:    General Appearance: on vent. Impella. Oral ETT. Cortrak. Nods or shakes head to questions. Squeezes hand. Jaundiced.   Skin: warm and dry  Neck: RIJ SG  Lungs: Clear to auscultation, no wheezing.   Heart: Irreg, irreg tachy, no rub.   Abdomen: soft, nontender, nondistended. + bs  : almanza  Extremities:1+ upper ext edema. Trace lower ext edema. Right femoral line for impella.   Neuro: appropriate response to questions.  Follows commands.     Scheduled Meds:     chlorhexidine, 15 mL, Mouth/Throat, Q12H  pantoprazole, 40 mg, Intravenous, Daily  piperacillin-tazobactam, 3.375 g, Intravenous, Q8H  sodium chloride, 10 mL, Intravenous, Q12H      IV Meds:   heparin in 500 mL dextrose 5% in water (Impella), 14 mL/hr, Last Rate: 14 mL/hr (10/31/21 1256)  milrinone, 0.25-0.75 mcg/kg/min, Last Rate: 0.25 mcg/kg/min (21 9471)  norepinephrine, 0.02-0.3 mcg/kg/min, Last Rate: Stopped (21 2269)  phenylephrine, 0.5-3 mcg/kg/min, Last Rate: Stopped (10/30/21 0599)  propofol, 5-50 mcg/kg/min, Last Rate: 15 mcg/kg/min (21  0515)  sodium chloride, 125 mL/hr, Last Rate: 125 mL/hr (11/02/21 0043)        Results Reviewed:   I have personally reviewed the results from the time of this admission to 11/2/2021 07:38 EDT     Results from last 7 days   Lab Units 11/02/21  0403 11/02/21  0009 11/01/21  2135 11/01/21  1522 11/01/21  0426 11/01/21  0426 10/31/21  0825 10/31/21  0250   SODIUM mmol/L 142  --   --   --   --  139  --  138   POTASSIUM mmol/L 3.6  --  3.6  --   --  3.4*   < > 3.0*   CHLORIDE mmol/L 102  --   --   --   --  103  --  100   CO2 mmol/L 28.4  --   --   --   --  26.5  --  24.6   BUN mg/dL 30*  --   --   --   --  37*  --  40*   CREATININE mg/dL 1.63* 1.66*  --  1.89*   < > 1.85*  1.85*   < > 2.15*   CALCIUM mg/dL 8.0*  --   --   --   --  7.4*  --  7.2*   BILIRUBIN mg/dL 5.4*  --   --   --   --  3.4*  --  3.9*   ALK PHOS U/L 62  --   --   --   --  54  --  47   ALT (SGPT) U/L 2,723*  --   --   --   --  3,451*  --  5,215*   AST (SGOT) U/L 985*  --   --   --   --  2,182*  --  >7,000*   GLUCOSE mg/dL 108*  --   --   --   --  94  --  102*    < > = values in this interval not displayed.       Estimated Creatinine Clearance: 61.7 mL/min (A) (by C-G formula based on SCr of 1.63 mg/dL (H)).    Results from last 7 days   Lab Units 10/31/21  0250 10/30/21  2227 10/29/21  1940   MAGNESIUM mg/dL 2.1 2.2 2.3       Results from last 7 days   Lab Units 10/30/21  0356   URIC ACID mg/dL 16.8*       Results from last 7 days   Lab Units 11/02/21  0403 11/01/21  1522 11/01/21  0426 10/31/21  1359 10/31/21  0250   WBC 10*3/mm3 7.01 8.97 7.52 9.55 7.15   HEMOGLOBIN g/dL 13.0 13.9 12.2* 12.7* 12.6*   PLATELETS 10*3/mm3 56* 66* 64* 79* 44*       Results from last 7 days   Lab Units 11/02/21  0558 11/01/21  1522 11/01/21  0426 10/31/21  1359 10/31/21  0250   INR  1.29* 1.32* 1.65* 2.04* 2.52*       Assessment / Plan     ASSESSMENT:  1. KRISHNA.  Creatinine at plateau.  Excellent urine output in response to lasix /albumin. Volume status by exam improved.   Upper ext edema due to DVT IJ on right. DC the Maintenance IVF.  K and bicarb ok.  2. Nonischemic cardiogenic shock on Impella. Biventricular failure. On milrinone. Acute respiratory failure on vent .  3. Shock liver.  Slowly improving.   4. TCP, coagulopathy with correction on mixing study. .  5. Atrial fibrillation. Digoxin loaded yesterday.  Rate overall improved.   6. RIJ DVT, LUE superficial thrombophlebitis. On  Heparin drip  PLAN:  1. DC lasix, albumin, maintenance IVF.   2. Dw Sister and Dr. Ma.       An Kirkpatrick MD  11/02/21  07:38 EDT    [facility]  [phone]

## 2021-11-02 NOTE — PLAN OF CARE
Pt remains in CCU. Off Impella and vent today. Right IJ and [place] out as well.    Pt remains on lowest dose of milrinone. Replacing K+.     Family at bedside.    Will continue to closely monitor.       Goal Outcome Evaluation:        Problem: Skin Injury Risk Increased  Goal: Skin Health and Integrity  Outcome: Ongoing, Progressing  Intervention: Optimize Skin Protection  Recent Flowsheet Documentation  Taken 11/2/2021 0800 by Ramandeep Su RN  Pressure Reduction Techniques:   frequent weight shift encouraged   weight shift assistance provided   sit time limited to 2 hours   heels elevated off bed   positioned off wounds  Pressure Reduction Devices: specialty bed utilized  Skin Protection:   adhesive use limited   tubing/devices free from skin contact   skin-to-skin areas padded   skin-to-device areas padded     Problem: Fall Injury Risk  Goal: Absence of Fall and Fall-Related Injury  Outcome: Ongoing, Progressing  Intervention: Identify and Manage Contributors to Fall Injury Risk  Recent Flowsheet Documentation  Taken 11/2/2021 1700 by Ramandeep Su RN  Medication Review/Management: medications reviewed  Taken 11/2/2021 1600 by Ramandeep Su RN  Medication Review/Management: medications reviewed  Taken 11/2/2021 1500 by Ramandeep Su RN  Medication Review/Management: medications reviewed  Taken 11/2/2021 1400 by Ramandeep Su RN  Medication Review/Management: medications reviewed  Taken 11/2/2021 1300 by Ramandeep Su RN  Medication Review/Management: medications reviewed  Taken 11/2/2021 1200 by Ramandeep Su RN  Medication Review/Management: medications reviewed  Taken 11/2/2021 1100 by Ramandeep Su RN  Medication Review/Management: medications reviewed  Taken 11/2/2021 1000 by Ramandeep Su RN  Medication Review/Management: medications reviewed  Taken 11/2/2021 0900 by Ramandeep Su RN  Medication Review/Management: medications reviewed  Taken 11/2/2021 0800 by Ramandeep Su RN  Medication  Review/Management: medications reviewed  Intervention: Promote Injury-Free Environment  Recent Flowsheet Documentation  Taken 11/2/2021 1700 by Ramandeep Su RN  Safety Promotion/Fall Prevention: safety round/check completed  Taken 11/2/2021 1600 by Ramandeep Su RN  Safety Promotion/Fall Prevention: safety round/check completed  Taken 11/2/2021 1500 by Ramandeep Su RN  Safety Promotion/Fall Prevention: safety round/check completed  Taken 11/2/2021 1400 by Ramandeep Su RN  Safety Promotion/Fall Prevention: safety round/check completed  Taken 11/2/2021 1300 by Ramandeep Su RN  Safety Promotion/Fall Prevention: safety round/check completed  Taken 11/2/2021 1200 by Ramandeep Su RN  Safety Promotion/Fall Prevention: safety round/check completed  Taken 11/2/2021 1100 by Ramandeep Su RN  Safety Promotion/Fall Prevention: (RN in cath lab with pt) patient off unit  Taken 11/2/2021 1000 by Ramandeep Su RN  Safety Promotion/Fall Prevention: safety round/check completed  Taken 11/2/2021 0900 by Ramandeep Su RN  Safety Promotion/Fall Prevention: safety round/check completed  Taken 11/2/2021 0800 by Ramandeep Su RN  Safety Promotion/Fall Prevention: safety round/check completed     Problem: Adult Inpatient Plan of Care  Goal: Plan of Care Review  Outcome: Ongoing, Progressing  Goal: Patient-Specific Goal (Individualized)  Outcome: Ongoing, Progressing  Goal: Absence of Hospital-Acquired Illness or Injury  Outcome: Ongoing, Progressing  Intervention: Identify and Manage Fall Risk  Recent Flowsheet Documentation  Taken 11/2/2021 1700 by Ramandeep Su RN  Safety Promotion/Fall Prevention: safety round/check completed  Taken 11/2/2021 1600 by Ramandeep Su RN  Safety Promotion/Fall Prevention: safety round/check completed  Taken 11/2/2021 1500 by Ramandeep Su RN  Safety Promotion/Fall Prevention: safety round/check completed  Taken 11/2/2021 1400 by Ramandeep Su RN  Safety Promotion/Fall  Prevention: safety round/check completed  Taken 11/2/2021 1300 by Ramandeep Su RN  Safety Promotion/Fall Prevention: safety round/check completed  Taken 11/2/2021 1200 by Ramandeep Su RN  Safety Promotion/Fall Prevention: safety round/check completed  Taken 11/2/2021 1100 by Ramandeep Su RN  Safety Promotion/Fall Prevention: (RN in cath lab with pt) patient off unit  Taken 11/2/2021 1000 by Ramandeep Su RN  Safety Promotion/Fall Prevention: safety round/check completed  Taken 11/2/2021 0900 by Ramandeep Su RN  Safety Promotion/Fall Prevention: safety round/check completed  Taken 11/2/2021 0800 by Ramandeep Su RN  Safety Promotion/Fall Prevention: safety round/check completed  Intervention: Prevent Skin Injury  Recent Flowsheet Documentation  Taken 11/2/2021 0800 by Ramandeep Su RN  Skin Protection:   adhesive use limited   tubing/devices free from skin contact   skin-to-skin areas padded   skin-to-device areas padded  Intervention: Prevent and Manage VTE (venous thromboembolism) Risk  Recent Flowsheet Documentation  Taken 11/2/2021 0800 by Ramandeep Su RN  VTE Prevention/Management:   left   sequential compression devices on  Goal: Optimal Comfort and Wellbeing  Outcome: Ongoing, Progressing  Intervention: Provide Person-Centered Care  Recent Flowsheet Documentation  Taken 11/2/2021 1600 by Ramandeep Su RN  Trust Relationship/Rapport:   care explained   choices provided   emotional support provided   questions answered   questions encouraged   thoughts/feelings acknowledged   reassurance provided   empathic listening provided  Taken 11/2/2021 0800 by Ramandeep Su RN  Trust Relationship/Rapport:   care explained   choices provided   emotional support provided   reassurance provided   thoughts/feelings acknowledged  Goal: Readiness for Transition of Care  Outcome: Ongoing, Progressing

## 2021-11-02 NOTE — PROGRESS NOTES
LOS: 4 days   Patient Care Team:  Provider, No Known as PCP - General    Chief Complaint:     Follow-up cardiogenic shock    Interval History:     Patient is awake on the ventilator and answering questions. He denies chest pain and is breathing is stable. Last night he put out 9 L of fluid.     Objective   Vital Signs  Temp:  [96.98 °F (36.1 °C)-98.7 °F (37.1 °C)] 97.9 °F (36.6 °C)  Heart Rate:  [] 107  Resp:  [12-14] 12  BP: ()/() 107/93  Arterial Line BP: ()/() 93/71  FiO2 (%):  [30 %] 30 %    Intake/Output Summary (Last 24 hours) at 11/2/2021 0741  Last data filed at 11/2/2021 0600  Gross per 24 hour   Intake 4480.84 ml   Output 9097 ml   Net -4616.16 ml       Comfortable NAD  Neck supple, no JVD or thyromegaly appreciated  S1/S2 RRR, no m/r/g  Lungs CTA B, normal effort  Abdomen S/NT/ND (+) BS, no HSM appreciated  Extremities warm, no clubbing, cyanosis, 1+ edema  No visible or palpable skin lesions  Awake on vent    Results Review:      Results from last 7 days   Lab Units 11/02/21  0403 11/02/21  0009 11/01/21  2135 11/01/21  1522 11/01/21  0426 11/01/21  0426 10/31/21  0825 10/31/21  0250   SODIUM mmol/L 142  --   --   --   --  139  --  138   POTASSIUM mmol/L 3.6  --  3.6  --   --  3.4*   < > 3.0*   CHLORIDE mmol/L 102  --   --   --   --  103  --  100   CO2 mmol/L 28.4  --   --   --   --  26.5  --  24.6   BUN mg/dL 30*  --   --   --   --  37*  --  40*   CREATININE mg/dL 1.63* 1.66*  --  1.89*   < > 1.85*  1.85*   < > 2.15*   GLUCOSE mg/dL 108*  --   --   --    < > 94  --  102*   CALCIUM mg/dL 8.0*  --   --   --   --  7.4*  --  7.2*    < > = values in this interval not displayed.     Results from last 7 days   Lab Units 10/31/21  1527 10/31/21  0825 10/30/21  2227 10/30/21  1719 10/29/21  1247   CK TOTAL U/L 339* 347* 351*   < >  --    TROPONIN T ng/mL  --   --   --   --  0.038*    < > = values in this interval not displayed.     Results from last 7 days   Lab Units  11/02/21  0403 11/01/21  1522 11/01/21  0426   WBC 10*3/mm3 7.01 8.97 7.52   HEMOGLOBIN g/dL 13.0 13.9 12.2*   HEMATOCRIT % 39.9 41.9 35.5*   PLATELETS 10*3/mm3 56* 66* 64*     Results from last 7 days   Lab Units 11/02/21  0558 11/01/21  1522 11/01/21  0426   INR  1.29* 1.32* 1.65*   APTT seconds 42.2* 36.9* 53.4*         Results from last 7 days   Lab Units 10/31/21  0250   MAGNESIUM mg/dL 2.1     Results from last 7 days   Lab Units 11/02/21  0403   TRIGLYCERIDES mg/dL 102       I reviewed the patient's new clinical results.  I personally viewed and interpreted the patient's EKG/Telemetry data        Medication Review:   chlorhexidine, 15 mL, Mouth/Throat, Q12H  pantoprazole, 40 mg, Intravenous, Daily  piperacillin-tazobactam, 3.375 g, Intravenous, Q8H  sodium chloride, 10 mL, Intravenous, Q12H        heparin in 500 mL dextrose 5% in water (Impella), 14 mL/hr, Last Rate: 14 mL/hr (10/31/21 1256)  milrinone, 0.25-0.75 mcg/kg/min, Last Rate: 0.25 mcg/kg/min (11/01/21 2143)  norepinephrine, 0.02-0.3 mcg/kg/min, Last Rate: Stopped (11/01/21 0745)  phenylephrine, 0.5-3 mcg/kg/min, Last Rate: Stopped (10/30/21 0545)  propofol, 5-50 mcg/kg/min, Last Rate: 15 mcg/kg/min (11/02/21 0515)  sodium chloride, 125 mL/hr, Last Rate: 125 mL/hr (11/02/21 0043)        Assessment/Plan       Atrial fibrillation with rapid ventricular response (HCC)    KRISHNA (acute kidney injury) (HCC)    Hyperkalemia    Right upper quadrant abdominal pain    Elevated LFTs    Mitral insufficiency, acute    Cardiogenic shock (HCC)    Coagulopathy (HCC)    Thrombocytopenia (HCC)    Other specified anemias    Hypofibrinogenemia (HCC)    1. cardiogenic shock with severe biventricular failure - not sure still of etiology.   Impella in place without real improvement.   2. Severe mitral insufficiency - functional.   3. Transaminitis, shock liver due to poor cardiac output - improving  4. acute renal failure - prerenal- stable and diuresed a lot overnight  "  5. Atrial fibrillation with rapid ventricular response on arrival-  has been cardioverted twice but went back into atrial fibrillation  - rate controlled.   6. Acute respiratory failure on vent - CXR pretty clear given everything  7. Coagulopathy with thrombocytopenia.  Coagulopathy is improving and his shock and liver are improving.       He is on the lowest dose of milrinone and is tolerating this. He did get albumin and Lasix yesterday and diuresed quite a bit. Nephrology saw him this morning and is stopping Lasix and albumin due to excessive urine output. He is off of Levophed.    Sister is at bedside, she is from [facility] and is a nurse. In talking with her, he really has been estranged from the family for years. According to her, he has never been a drinker and has been a \"healthy nut\"-always taking care of himself. Their father is an alcoholic and so for that reason he has never had alcohol.    Plan would be to start weaning Impella and ventilator. I think he is ready action to come off the ventilator altogether. We will talk with Dr. Cunha about the Impella. In the meantime, we will get a stat bedside echocardiogram to reassess LV function.    Echo today.     Padmini Ma MD  11/02/21  07:41 EDT      "

## 2021-11-02 NOTE — PROGRESS NOTES
[facility] INPATIENT PROGRESS NOTE    Length of Stay:  4 days    CHIEF COMPLAINT: Cardiogenic shock with multiorgan failure, coagulopathy, thrombocytopenia, right upper extremity/IJ DVT      SUBJECTIVE: Patient is awake and alert on the ventilator this morning following commands.  He has remained off of pressor support.  He continues with Impella device in place and flat rate heparin drip.  Plans to remove Impella device and potentially extubate patient today.      ROS:  Review of Systems unable to obtain full review of systems due to patient currently intubated.    OBJECTIVE:  Vitals:    11/02/21 0730 11/02/21 0800 11/02/21 0812 11/02/21 0900   BP: 118/80 118/77  121/85   Pulse: 95 103 95 102   Resp:   14    Temp: 97.7 °F (36.5 °C) 98.06 °F (36.7 °C) 98.06 °F (36.7 °C) 98.06 °F (36.7 °C)   TempSrc:       SpO2: 98% 98% 95% 98%   Weight:       Height:             PHYSICAL EXAMINATION:  General: Patient is intubated, awake and following commands on the ventilator  Chest/Lungs: Clear to auscultation bilaterally anteriorly  Heart: Regular rate and rhythm  Abdomen/GI: Soft nontender nondistended bowel sounds present  Extremities: Decreased edema noted in the upper and lower extremities.  Line sites in neck, groins bilaterally with no evidence of bleeding.    DIAGNOSTIC DATA:  Results Review:     I reviewed the patient's new clinical results.    Results from last 7 days   Lab Units 11/02/21  0403 11/01/21  1522 11/01/21  0426   WBC 10*3/mm3 7.01 8.97 7.52   HEMOGLOBIN g/dL 13.0 13.9 12.2*   HEMATOCRIT % 39.9 41.9 35.5*   PLATELETS 10*3/mm3 56* 66* 64*      Results from last 7 days   Lab Units 11/02/21  0403 11/02/21  0009 11/01/21  2135 11/01/21  1522 11/01/21  0426 11/01/21  0426 10/31/21  0825 10/31/21  0250   SODIUM mmol/L 142  --   --   --   --  139  --  138   POTASSIUM mmol/L 3.6  --  3.6  --   --  3.4*   < > 3.0*   CHLORIDE mmol/L 102  --   --   --   --  103  --  100   CO2 mmol/L 28.4  --   --   --    --  26.5  --  24.6   BUN mg/dL 30*  --   --   --   --  37*  --  40*   CREATININE mg/dL 1.63* 1.66*  --  1.89*   < > 1.85*  1.85*   < > 2.15*   CALCIUM mg/dL 8.0*  --   --   --   --  7.4*  --  7.2*   BILIRUBIN mg/dL 5.4*  --   --   --   --  3.4*  --  3.9*   ALK PHOS U/L 62  --   --   --   --  54  --  47   ALT (SGPT) U/L 2,723*  --   --   --   --  3,451*  --  5,215*   AST (SGOT) U/L 985*  --   --   --   --  2,182*  --  >7,000*   GLUCOSE mg/dL 108*  --   --   --   --  94  --  102*    < > = values in this interval not displayed.      Lab Results   Component Value Date    NEUTROABS 5.90 11/02/2021     Results from last 7 days   Lab Units 11/02/21  0558 11/01/21  1522 11/01/21  0426   INR  1.29* 1.32* 1.65*   APTT seconds 42.2* 36.9* 53.4*     Results from last 7 days   Lab Units 10/31/21  0250   MAGNESIUM mg/dL 2.1       Assessment/Plan   ASSESSMENT/PLAN:  This is a 56 y.o. male with:     Cardiogenic shock, atrial fibrillation  · Biventricular failure, etiology unclear  · Patient requiring pressor support  · Patient had transthoracic echocardiogram, and cardiac catheterization on 10/29/2021.  · Status post cardioversion x2, persistent atrial fibrillation  · Impella device placed, initiated heparin  · Patient had POOJA on 10/30/2021 with ejection fraction 26-30%, severe dilation right ventricular cavity, moderate mitral regurgitation, Impella device in left ventricle.  · Investigation regarding potential cardiac transplant per cardiology, does not appear feasible due to lack of social support.  · Patient currently off of pressors, continuing on milrinone  · Bedside echocardiogram today with ejection fraction 50%, low normal, normal right ventricular systolic function.  · Discussed patient with Dr. Cunha and Dr. Coreas.  Plans to remove Impella device today and possibly extubate.  Etiology of CHF remains unclear however patient appears to be improving.     *Acute hypoxic respiratory failure    · Patient remains on  ventilatory support  · Note plans for possible extubation today     *Shock liver  · Severe hepatic dysfunction secondary to cardiogenic shock  · Transaminases trending down with ALT 2723,  however bilirubin increased at 5.4.    *KRISHNA  · Secondary to cardiogenic shock  · Nephrology following  · Creatinine today trending down at 1.63     *Coagulopathy with hypofibrinogenemia/DIC  · Likely secondary to shock liver and reduced synthetic function in addition to possible effects from Impella device producing consumption  · Heparin drip initiated with placement of Impella device  · Patient has received FFP, cryoprecipitate, vitamin K  · PT mixing study 10/31/2021 with decreased from 22.3 down to 14.9 (near correction indicating likely a factor deficiency)  · Reluctant to administer further FFP/cryoprecipitate in the setting of active thrombosis (see below).  · Coags today with stable normal fibrinogen at 299, improvement in INR at 1.29.  PTT 42.2 on flat rate heparin drip.  Platelet count stable to slightly decreased at 56,000 today.  No current bleeding issues.  No need for factor replacement or platelet transfusion today.  Recheck labs in a.m.  Note potential removal of Impella device today which will likely lead to improvement in platelet count    *Right upper extremity/IJ catheter associated/IJ DVT  · Doppler on 10/31/2021 with acute right upper extremity catheter associated IJ thrombus, acute left upper extremity superficial thrombophlebitis (cephalic).  · Patient continuing on heparin with Impella device (heparin initiated 10/30/2021)  · Patient currently continuing on heparin at fixed rate with Impella device initiated 10/30/2021.  Patient with underlying coagulopathy and thrombocytopenia.  Reluctant to increase heparin rate currently in setting of thrombocytopenia and coagulopathy.  PTT today is 42.2.  Discussed with Dr. Cunha today, note plans for probable removal of Impella device.  Flat rate heparin would  stop at the same time.  For now we will hold on initiating heparin drip until we recheck platelet count in a.m.  If there has been improvement in platelet count at that time we will then begin weight-based heparin protocol for treatment of right upper extremity DVT.    *Thrombocytopenia.   · No prior labs available  · On admission 10/29/2021 platelets 131,000.   · Platelets trended down, 71,000 on 10/30/2021  · Patient received platelet transfusion 2 units around time of placement of Impella device  · On 10/31/2021 platelet count 44,000, received 1 additional unit platelets with increase to 79,000  · Today, platelet count is slightly lower at 56,000.  No current bleeding issues.  Recheck in a.m.     *Erythrocytosis  · Hematocrit on admission was 53.3 10/29/2021  · Hematocrit has gradually declined since admission into the 12-13 range  · Current hematocrit 39.9    *Possible pneumonia/sepsis  · Patient received empiric Zosyn, completed course.     PLAN:  1. The patient is currently receiving fixed dose heparin with Impella device.  Potential removal of Impella device today with discontinuation of fixed dose heparin.  2. Note plans for potential extubation today  3. Monitor for signs of bleeding on anticoagulation in the setting of coagulopathy and thrombocytopenia  4. We will hold for now on initiating full dose heparin drip following removal of Impella device today.  5. CBC, CMP, PT, PTT, fibrinogen in a.m.  If coags stable and platelet count improved we will consider initiating weight-based heparin protocol at that time for treatment of right upper extremity DVT.    Addendum: Orders placed for heparin drip this afternoon.  We will go ahead and recheck a platelet count prior to initiating heparin and if levels are 50,000 or above we will proceed as planned.  If platelets less than 50,000 hold heparin.      Discussed with Dr. Cunha and Dr. Coreas today.  Discussed with patient, sister, and brother at bedside.            Denis Melgoza MD

## 2021-11-03 ENCOUNTER — APPOINTMENT (OUTPATIENT)
Dept: CARDIOLOGY | Facility: HOSPITAL | Age: 56
End: 2021-11-03

## 2021-11-03 LAB
ALBUMIN SERPL-MCNC: 2.9 G/DL (ref 3.5–5.2)
ALBUMIN/GLOB SERPL: 1.5 G/DL
ALP SERPL-CCNC: 65 U/L (ref 39–117)
ALT SERPL W P-5'-P-CCNC: 1781 U/L (ref 1–41)
ANION GAP SERPL CALCULATED.3IONS-SCNC: 7.6 MMOL/L (ref 5–15)
APTT PPP: 30.1 SECONDS (ref 22.7–35.4)
AST SERPL-CCNC: 446 U/L (ref 1–40)
BACTERIA SPEC AEROBE CULT: NORMAL
BACTERIA SPEC AEROBE CULT: NORMAL
BASOPHILS # BLD AUTO: 0.01 10*3/MM3 (ref 0–0.2)
BASOPHILS NFR BLD AUTO: 0.2 % (ref 0–1.5)
BH CV LOW VAS LEFT COMMON FEMORAL SPONT: 1
BH CV LOW VAS RIGHT COMMON FEMORAL SPONT: 1
BH CV LOW VAS RIGHT GASTRONEMIUS VESSEL: 1
BH CV LOW VAS RIGHT PROFUNDA FEMORAL SPONT: 1
BH CV LOWER VASCULAR LEFT COMMON FEMORAL AUGMENT: NORMAL
BH CV LOWER VASCULAR LEFT COMMON FEMORAL COMPRESS: NORMAL
BH CV LOWER VASCULAR LEFT COMMON FEMORAL PHASIC: NORMAL
BH CV LOWER VASCULAR LEFT COMMON FEMORAL SPONT: NORMAL
BH CV LOWER VASCULAR LEFT COMMON FEMORAL THROMBUS: NORMAL
BH CV LOWER VASCULAR LEFT DISTAL FEMORAL COMPRESS: NORMAL
BH CV LOWER VASCULAR LEFT GASTRONEMIUS COMPRESS: NORMAL
BH CV LOWER VASCULAR LEFT GREATER SAPH BK COMPRESS: NORMAL
BH CV LOWER VASCULAR LEFT LESSER SAPH COMPRESS: NORMAL
BH CV LOWER VASCULAR LEFT MID FEMORAL AUGMENT: NORMAL
BH CV LOWER VASCULAR LEFT MID FEMORAL COMPETENT: NORMAL
BH CV LOWER VASCULAR LEFT MID FEMORAL COMPRESS: NORMAL
BH CV LOWER VASCULAR LEFT MID FEMORAL PHASIC: NORMAL
BH CV LOWER VASCULAR LEFT MID FEMORAL SPONT: NORMAL
BH CV LOWER VASCULAR LEFT PERONEAL COMPRESS: NORMAL
BH CV LOWER VASCULAR LEFT POPLITEAL AUGMENT: NORMAL
BH CV LOWER VASCULAR LEFT POPLITEAL COMPETENT: NORMAL
BH CV LOWER VASCULAR LEFT POPLITEAL COMPRESS: NORMAL
BH CV LOWER VASCULAR LEFT POPLITEAL PHASIC: NORMAL
BH CV LOWER VASCULAR LEFT POPLITEAL SPONT: NORMAL
BH CV LOWER VASCULAR LEFT POSTERIOR TIBIAL COMPRESS: NORMAL
BH CV LOWER VASCULAR LEFT PROFUNDA FEMORAL COMPRESS: NORMAL
BH CV LOWER VASCULAR LEFT PROXIMAL FEMORAL COMPRESS: NORMAL
BH CV LOWER VASCULAR RIGHT COMMON FEMORAL AUGMENT: NORMAL
BH CV LOWER VASCULAR RIGHT COMMON FEMORAL COMPRESS: NORMAL
BH CV LOWER VASCULAR RIGHT COMMON FEMORAL PHASIC: NORMAL
BH CV LOWER VASCULAR RIGHT COMMON FEMORAL SPONT: NORMAL
BH CV LOWER VASCULAR RIGHT COMMON FEMORAL THROMBUS: NORMAL
BH CV LOWER VASCULAR RIGHT DISTAL FEMORAL COMPRESS: NORMAL
BH CV LOWER VASCULAR RIGHT GASTRONEMIUS COMPRESS: NORMAL
BH CV LOWER VASCULAR RIGHT GASTRONEMIUS THROMBUS: NORMAL
BH CV LOWER VASCULAR RIGHT GREATER SAPH BK COMPRESS: NORMAL
BH CV LOWER VASCULAR RIGHT LESSER SAPH COMPRESS: NORMAL
BH CV LOWER VASCULAR RIGHT MID FEMORAL AUGMENT: NORMAL
BH CV LOWER VASCULAR RIGHT MID FEMORAL COMPETENT: NORMAL
BH CV LOWER VASCULAR RIGHT MID FEMORAL COMPRESS: NORMAL
BH CV LOWER VASCULAR RIGHT MID FEMORAL PHASIC: NORMAL
BH CV LOWER VASCULAR RIGHT MID FEMORAL SPONT: NORMAL
BH CV LOWER VASCULAR RIGHT PERONEAL COMPRESS: NORMAL
BH CV LOWER VASCULAR RIGHT POPLITEAL AUGMENT: NORMAL
BH CV LOWER VASCULAR RIGHT POPLITEAL COMPETENT: NORMAL
BH CV LOWER VASCULAR RIGHT POPLITEAL COMPRESS: NORMAL
BH CV LOWER VASCULAR RIGHT POPLITEAL PHASIC: NORMAL
BH CV LOWER VASCULAR RIGHT POPLITEAL SPONT: NORMAL
BH CV LOWER VASCULAR RIGHT POSTERIOR TIBIAL COMPRESS: NORMAL
BH CV LOWER VASCULAR RIGHT PROFUNDA FEMORAL COMPRESS: NORMAL
BH CV LOWER VASCULAR RIGHT PROFUNDA FEMORAL THROMBUS: NORMAL
BH CV LOWER VASCULAR RIGHT PROXIMAL FEMORAL COMPRESS: NORMAL
BH CV LOWER VASCULAR RIGHT SAPHENOFEMORAL JUNCTION COMPRESS: NORMAL
BH CV UPPER VENOUS LEFT CEPHALIC UPPER COLOR: 1
BH CV UPPER VENOUS LEFT CEPHALIC UPPER COMPRESS: NORMAL
BH CV UPPER VENOUS LEFT CEPHALIC UPPER THROMBUS: NORMAL
BH CV UPPER VENOUS LEFT INTERNAL JUGULAR AUGMENT: NORMAL
BH CV UPPER VENOUS LEFT INTERNAL JUGULAR COMPETENT: NORMAL
BH CV UPPER VENOUS LEFT INTERNAL JUGULAR COMPRESS: NORMAL
BH CV UPPER VENOUS LEFT INTERNAL JUGULAR PHASIC: NORMAL
BH CV UPPER VENOUS LEFT INTERNAL JUGULAR SPONT: NORMAL
BH CV UPPER VENOUS LEFT SUBCLAVIAN AUGMENT: NORMAL
BH CV UPPER VENOUS LEFT SUBCLAVIAN COMPETENT: NORMAL
BH CV UPPER VENOUS LEFT SUBCLAVIAN COMPRESS: NORMAL
BH CV UPPER VENOUS LEFT SUBCLAVIAN PHASIC: NORMAL
BH CV UPPER VENOUS LEFT SUBCLAVIAN SPONT: NORMAL
BH CV UPPER VENOUS RIGHT AXILLARY AUGMENT: NORMAL
BH CV UPPER VENOUS RIGHT AXILLARY COMPETENT: NORMAL
BH CV UPPER VENOUS RIGHT AXILLARY COMPRESS: NORMAL
BH CV UPPER VENOUS RIGHT AXILLARY PHASIC: NORMAL
BH CV UPPER VENOUS RIGHT AXILLARY SPONT: NORMAL
BH CV UPPER VENOUS RIGHT BASILIC FOREARM COMPRESS: NORMAL
BH CV UPPER VENOUS RIGHT BASILIC UPPER COLOR: 1
BH CV UPPER VENOUS RIGHT BASILIC UPPER COMPRESS: NORMAL
BH CV UPPER VENOUS RIGHT BASILIC UPPER THROMBUS: NORMAL
BH CV UPPER VENOUS RIGHT BRACHIAL COLOR: 1
BH CV UPPER VENOUS RIGHT BRACHIAL COMPRESS: NORMAL
BH CV UPPER VENOUS RIGHT BRACHIAL THROMBUS: NORMAL
BH CV UPPER VENOUS RIGHT CEPHALIC UPPER COLOR: 1
BH CV UPPER VENOUS RIGHT CEPHALIC UPPER COMPRESS: NORMAL
BH CV UPPER VENOUS RIGHT CEPHALIC UPPER THROMBUS: NORMAL
BH CV UPPER VENOUS RIGHT INTERNAL JUGULAR COLOR: 1
BH CV UPPER VENOUS RIGHT INTERNAL JUGULAR COMPRESS: NORMAL
BH CV UPPER VENOUS RIGHT INTERNAL JUGULAR PHASIC: NORMAL
BH CV UPPER VENOUS RIGHT INTERNAL JUGULAR SPONT: NORMAL
BH CV UPPER VENOUS RIGHT INTERNAL JUGULAR THROMBUS: NORMAL
BH CV UPPER VENOUS RIGHT MED CUBITAL COLOR: 1
BH CV UPPER VENOUS RIGHT MED CUBITAL COMPRESS: NORMAL
BH CV UPPER VENOUS RIGHT MED CUBITAL THROMBUS: NORMAL
BH CV UPPER VENOUS RIGHT RADIAL COMPRESS: NORMAL
BH CV UPPER VENOUS RIGHT SUBCLAVIAN AUGMENT: NORMAL
BH CV UPPER VENOUS RIGHT SUBCLAVIAN COLOR: 1
BH CV UPPER VENOUS RIGHT SUBCLAVIAN COMPRESS: NORMAL
BH CV UPPER VENOUS RIGHT SUBCLAVIAN PHASIC: NORMAL
BH CV UPPER VENOUS RIGHT SUBCLAVIAN SPONT: NORMAL
BH CV UPPER VENOUS RIGHT SUBCLAVIAN THROMBUS: NORMAL
BH CV UPPER VENOUS RIGHT ULNAR COMPRESS: NORMAL
BILIRUB SERPL-MCNC: 3.5 MG/DL (ref 0–1.2)
BUN SERPL-MCNC: 21 MG/DL (ref 6–20)
BUN/CREAT SERPL: 20.6 (ref 7–25)
CA-I BLD-MCNC: 4.5 MG/DL (ref 4.6–5.4)
CA-I SERPL ISE-MCNC: 1.13 MMOL/L (ref 1.15–1.35)
CALCIUM SPEC-SCNC: 8.1 MG/DL (ref 8.6–10.5)
CHLORIDE SERPL-SCNC: 105 MMOL/L (ref 98–107)
CO2 SERPL-SCNC: 30.4 MMOL/L (ref 22–29)
CREAT SERPL-MCNC: 1.02 MG/DL (ref 0.76–1.27)
CREAT SERPL-MCNC: 1.06 MG/DL (ref 0.76–1.27)
D-LACTATE SERPL-SCNC: 1.1 MMOL/L (ref 0.5–2)
D-LACTATE SERPL-SCNC: 1.2 MMOL/L (ref 0.5–2)
DEPRECATED RDW RBC AUTO: 52.7 FL (ref 37–54)
EOSINOPHIL # BLD AUTO: 0.15 10*3/MM3 (ref 0–0.4)
EOSINOPHIL NFR BLD AUTO: 2.4 % (ref 0.3–6.2)
ERYTHROCYTE [DISTWIDTH] IN BLOOD BY AUTOMATED COUNT: 14.6 % (ref 12.3–15.4)
FIBRINOGEN PPP-MCNC: 239 MG/DL (ref 219–464)
FOLATE SERPL-MCNC: 17.1 NG/ML (ref 4.78–24.2)
GFR SERPL CREATININE-BSD FRML MDRD: 72 ML/MIN/1.73
GFR SERPL CREATININE-BSD FRML MDRD: 75 ML/MIN/1.73
GLOBULIN UR ELPH-MCNC: 1.9 GM/DL
GLUCOSE BLDC GLUCOMTR-MCNC: 336 MG/DL (ref 70–130)
GLUCOSE SERPL-MCNC: 94 MG/DL (ref 65–99)
HCT VFR BLD AUTO: 35.8 % (ref 37.5–51)
HGB BLD-MCNC: 11.4 G/DL (ref 13–17.7)
INR PPP: 1.27 (ref 0.9–1.1)
LYMPHOCYTES # BLD AUTO: 0.5 10*3/MM3 (ref 0.7–3.1)
LYMPHOCYTES NFR BLD AUTO: 8.1 % (ref 19.6–45.3)
MAGNESIUM SERPL-MCNC: 1.6 MG/DL (ref 1.6–2.6)
MAXIMAL PREDICTED HEART RATE: 163 BPM
MCH RBC QN AUTO: 31.1 PG (ref 26.6–33)
MCHC RBC AUTO-ENTMCNC: 31.8 G/DL (ref 31.5–35.7)
MCV RBC AUTO: 97.5 FL (ref 79–97)
MONOCYTES # BLD AUTO: 0.79 10*3/MM3 (ref 0.1–0.9)
MONOCYTES NFR BLD AUTO: 12.8 % (ref 5–12)
NEUTROPHILS NFR BLD AUTO: 4.7 10*3/MM3 (ref 1.7–7)
NEUTROPHILS NFR BLD AUTO: 75.9 % (ref 42.7–76)
PHOSPHATE SERPL-MCNC: 2.3 MG/DL (ref 2.5–4.5)
PHOSPHATE SERPL-MCNC: 2.7 MG/DL (ref 2.5–4.5)
PLATELET # BLD AUTO: 36 10*3/MM3 (ref 140–450)
PLATELET # BLD AUTO: 36 10*3/MM3 (ref 140–450)
PLATELETS.RETICULATED NFR BLD AUTO: 7.7 % (ref 0.9–6.5)
PMV BLD AUTO: 12.5 FL (ref 6–12)
POTASSIUM SERPL-SCNC: 3.8 MMOL/L (ref 3.5–5.2)
PROT SERPL-MCNC: 4.8 G/DL (ref 6–8.5)
PROTHROMBIN TIME: 15.7 SECONDS (ref 11.7–14.2)
QT INTERVAL: 348 MS
RBC # BLD AUTO: 3.67 10*6/MM3 (ref 4.14–5.8)
SODIUM SERPL-SCNC: 143 MMOL/L (ref 136–145)
STRESS TARGET HR: 139 BPM
VIT B12 BLD-MCNC: >2000 PG/ML (ref 211–946)
WBC # BLD AUTO: 6.19 10*3/MM3 (ref 3.4–10.8)

## 2021-11-03 PROCEDURE — 84165 PROTEIN E-PHORESIS SERUM: CPT | Performed by: INTERNAL MEDICINE

## 2021-11-03 PROCEDURE — 85384 FIBRINOGEN ACTIVITY: CPT | Performed by: INTERNAL MEDICINE

## 2021-11-03 PROCEDURE — 0 MAGNESIUM SULFATE 4 GM/100ML SOLUTION: Performed by: INTERNAL MEDICINE

## 2021-11-03 PROCEDURE — 83883 ASSAY NEPHELOMETRY NOT SPEC: CPT | Performed by: INTERNAL MEDICINE

## 2021-11-03 PROCEDURE — 83605 ASSAY OF LACTIC ACID: CPT | Performed by: INTERNAL MEDICINE

## 2021-11-03 PROCEDURE — 25010000002 DIGOXIN PER 500 MCG: Performed by: INTERNAL MEDICINE

## 2021-11-03 PROCEDURE — 0 MILRINONE LACTATE IN DEXTROSE 20-5 MG/100ML-% SOLUTION: Performed by: INTERNAL MEDICINE

## 2021-11-03 PROCEDURE — 84100 ASSAY OF PHOSPHORUS: CPT | Performed by: INTERNAL MEDICINE

## 2021-11-03 PROCEDURE — 80053 COMPREHEN METABOLIC PANEL: CPT | Performed by: INTERNAL MEDICINE

## 2021-11-03 PROCEDURE — 82565 ASSAY OF CREATININE: CPT | Performed by: INTERNAL MEDICINE

## 2021-11-03 PROCEDURE — 82746 ASSAY OF FOLIC ACID SERUM: CPT | Performed by: INTERNAL MEDICINE

## 2021-11-03 PROCEDURE — 99233 SBSQ HOSP IP/OBS HIGH 50: CPT | Performed by: INTERNAL MEDICINE

## 2021-11-03 PROCEDURE — 82607 VITAMIN B-12: CPT | Performed by: INTERNAL MEDICINE

## 2021-11-03 PROCEDURE — 99231 SBSQ HOSP IP/OBS SF/LOW 25: CPT | Performed by: INTERNAL MEDICINE

## 2021-11-03 PROCEDURE — 86334 IMMUNOFIX E-PHORESIS SERUM: CPT | Performed by: INTERNAL MEDICINE

## 2021-11-03 PROCEDURE — 85025 COMPLETE CBC W/AUTO DIFF WBC: CPT | Performed by: INTERNAL MEDICINE

## 2021-11-03 PROCEDURE — 86022 PLATELET ANTIBODIES: CPT | Performed by: INTERNAL MEDICINE

## 2021-11-03 PROCEDURE — 93971 EXTREMITY STUDY: CPT

## 2021-11-03 PROCEDURE — 92610 EVALUATE SWALLOWING FUNCTION: CPT

## 2021-11-03 PROCEDURE — 82962 GLUCOSE BLOOD TEST: CPT

## 2021-11-03 PROCEDURE — 85610 PROTHROMBIN TIME: CPT | Performed by: INTERNAL MEDICINE

## 2021-11-03 PROCEDURE — 36415 COLL VENOUS BLD VENIPUNCTURE: CPT | Performed by: INTERNAL MEDICINE

## 2021-11-03 PROCEDURE — 85730 THROMBOPLASTIN TIME PARTIAL: CPT | Performed by: INTERNAL MEDICINE

## 2021-11-03 PROCEDURE — 93010 ELECTROCARDIOGRAM REPORT: CPT | Performed by: INTERNAL MEDICINE

## 2021-11-03 PROCEDURE — 82784 ASSAY IGA/IGD/IGG/IGM EACH: CPT | Performed by: INTERNAL MEDICINE

## 2021-11-03 PROCEDURE — 93005 ELECTROCARDIOGRAM TRACING: CPT | Performed by: INTERNAL MEDICINE

## 2021-11-03 PROCEDURE — 83735 ASSAY OF MAGNESIUM: CPT | Performed by: INTERNAL MEDICINE

## 2021-11-03 PROCEDURE — 85055 RETICULATED PLATELET ASSAY: CPT | Performed by: INTERNAL MEDICINE

## 2021-11-03 PROCEDURE — 99232 SBSQ HOSP IP/OBS MODERATE 35: CPT | Performed by: INTERNAL MEDICINE

## 2021-11-03 PROCEDURE — 82542 COL CHROMOTOGRAPHY QUAL/QUAN: CPT | Performed by: INTERNAL MEDICINE

## 2021-11-03 PROCEDURE — 93970 EXTREMITY STUDY: CPT

## 2021-11-03 PROCEDURE — 82330 ASSAY OF CALCIUM: CPT | Performed by: INTERNAL MEDICINE

## 2021-11-03 RX ORDER — METOPROLOL TARTRATE 50 MG/1
50 TABLET, FILM COATED ORAL EVERY 12 HOURS SCHEDULED
Status: DISCONTINUED | OUTPATIENT
Start: 2021-11-03 | End: 2021-11-03

## 2021-11-03 RX ORDER — MAGNESIUM SULFATE HEPTAHYDRATE 40 MG/ML
2 INJECTION, SOLUTION INTRAVENOUS AS NEEDED
Status: DISCONTINUED | OUTPATIENT
Start: 2021-11-03 | End: 2021-11-03

## 2021-11-03 RX ORDER — METOPROLOL SUCCINATE 50 MG/1
50 TABLET, EXTENDED RELEASE ORAL EVERY 12 HOURS SCHEDULED
Status: DISCONTINUED | OUTPATIENT
Start: 2021-11-03 | End: 2021-11-04

## 2021-11-03 RX ORDER — CALCIUM GLUCONATE 20 MG/ML
1 INJECTION, SOLUTION INTRAVENOUS AS NEEDED
Status: DISCONTINUED | OUTPATIENT
Start: 2021-11-03 | End: 2021-11-06

## 2021-11-03 RX ORDER — TORSEMIDE 20 MG/1
40 TABLET ORAL DAILY
Status: DISCONTINUED | OUTPATIENT
Start: 2021-11-03 | End: 2021-11-04

## 2021-11-03 RX ORDER — METOPROLOL TARTRATE 50 MG/1
50 TABLET, FILM COATED ORAL EVERY 12 HOURS SCHEDULED
Status: DISCONTINUED | OUTPATIENT
Start: 2021-11-03 | End: 2021-11-03 | Stop reason: SDUPTHER

## 2021-11-03 RX ORDER — MAGNESIUM SULFATE HEPTAHYDRATE 40 MG/ML
4 INJECTION, SOLUTION INTRAVENOUS AS NEEDED
Status: DISCONTINUED | OUTPATIENT
Start: 2021-11-03 | End: 2021-11-03

## 2021-11-03 RX ORDER — DIGOXIN 0.25 MG/ML
500 INJECTION INTRAMUSCULAR; INTRAVENOUS ONCE
Status: COMPLETED | OUTPATIENT
Start: 2021-11-03 | End: 2021-11-03

## 2021-11-03 RX ADMIN — CHLORHEXIDINE GLUCONATE 15 ML: 1.2 RINSE ORAL at 09:01

## 2021-11-03 RX ADMIN — TORSEMIDE 40 MG: 20 TABLET ORAL at 09:00

## 2021-11-03 RX ADMIN — SODIUM CHLORIDE, PRESERVATIVE FREE 10 ML: 5 INJECTION INTRAVENOUS at 20:32

## 2021-11-03 RX ADMIN — METOPROLOL SUCCINATE 50 MG: 50 TABLET, EXTENDED RELEASE ORAL at 20:10

## 2021-11-03 RX ADMIN — Medication 2 PACKET: at 06:15

## 2021-11-03 RX ADMIN — SODIUM CHLORIDE, PRESERVATIVE FREE 10 ML: 5 INJECTION INTRAVENOUS at 09:00

## 2021-11-03 RX ADMIN — METOPROLOL SUCCINATE 50 MG: 50 TABLET, EXTENDED RELEASE ORAL at 09:00

## 2021-11-03 RX ADMIN — MILRINONE LACTATE IN DEXTROSE 0.25 MCG/KG/MIN: 200 INJECTION, SOLUTION INTRAVENOUS at 01:20

## 2021-11-03 RX ADMIN — DIGOXIN 500 MCG: 250 INJECTION, SOLUTION INTRAMUSCULAR; INTRAVENOUS; PARENTERAL at 18:02

## 2021-11-03 RX ADMIN — CHLORHEXIDINE GLUCONATE 15 ML: 1.2 RINSE ORAL at 20:32

## 2021-11-03 RX ADMIN — PANTOPRAZOLE SODIUM 40 MG: 40 INJECTION, POWDER, FOR SOLUTION INTRAVENOUS at 09:00

## 2021-11-03 RX ADMIN — MAGNESIUM SULFATE HEPTAHYDRATE 4 G: 4 INJECTION, SOLUTION INTRAVENOUS at 06:17

## 2021-11-03 NOTE — THERAPY TREATMENT NOTE
Acute Care - Speech Language Pathology   Swallow Initial Evaluation [facility]     Patient Name: Denis Chavez  : 1965  MRN: [id number]  Today's Date: 11/3/2021               Admit Date: 10/29/2021    Visit Dx:     ICD-10-CM ICD-9-CM   1. Atrial fibrillation with rapid ventricular response (HCC)  I48.91 427.31   2. KRISHNA (acute kidney injury) (HCC)  N17.9 584.9   3. Hyperkalemia  E87.5 276.7   4. Right upper quadrant abdominal pain  R10.11 789.01   5. Elevated LFTs  R79.89 790.6   6. Cardiogenic shock (HCC)  R57.0 785.51     Patient Active Problem List   Diagnosis   • Atrial fibrillation with rapid ventricular response (HCC)   • KRISHNA (acute kidney injury) (HCC)   • Hyperkalemia   • Right upper quadrant abdominal pain   • Elevated LFTs   • Mitral insufficiency, acute   • Cardiogenic shock (HCC)   • Coagulopathy (HCC)   • Thrombocytopenia (HCC)   • Other specified anemias   • Hypofibrinogenemia (HCC)     No past medical history on file.  Past Surgical History:   Procedure Laterality Date   • CARDIAC CATHETERIZATION Right 10/29/2021    Procedure: Left Heart Cath;  Surgeon: Get Cunha MD;  Location: [facility] INVASIVE LOCATION;  Service: Cardiology;  Laterality: Right;   • CARDIAC CATHETERIZATION N/A 10/29/2021    Procedure: Coronary angiography;  Surgeon: Get Cunha MD;  Location: [facility] INVASIVE LOCATION;  Service: Cardiology;  Laterality: N/A;   • CARDIAC ELECTROPHYSIOLOGY PROCEDURE N/A 10/30/2021    Procedure: IMPELLA INSERTION;  Surgeon: Get Cunha MD;  Location: [facility] INVASIVE LOCATION;  Service: Cardiology;  Laterality: N/A;   • CARDIAC ELECTROPHYSIOLOGY PROCEDURE N/A 10/30/2021    Procedure: Cardioversion;  Surgeon: Get Cunha MD;  Location: [facility] INVASIVE LOCATION;  Service: Cardiology;  Laterality: N/A;   • CARDIAC ELECTROPHYSIOLOGY PROCEDURE N/A 2021    Procedure: Impella Removal;  Surgeon: Get Cunha MD;  Location: [facility] INVASIVE [facility];   Service: Cardiology;  Laterality: N/A;       SLP Recommendation and Plan  SLP Swallowing Diagnosis: swallow WFL (11/03/21 1500)  SLP Diet Recommendation: regular textures, thin liquids (11/03/21 1500)  Recommended Precautions and Strategies: upright posture during/after eating, small bites of food and sips of liquid, general aspiration precautions (slow rate) (11/03/21 1500)  SLP Rec. for Method of Medication Administration: meds whole, meds crushed, as tolerated (11/03/21 1500)     Monitor for Signs of Aspiration: yes, notify SLP if any concerns (11/03/21 1500)  Recommended Diagnostics: No further SLP services recommended (11/03/21 1500)     Anticipated Discharge Disposition (SLP): unknown (11/03/21 1500)     Therapy Frequency (Swallow): evaluation only (11/03/21 1500)             Plan for Continued Treatment (SLP): Plan to sign off at this time. (11/03/21 1500)                     SWALLOW EVALUATION (last 72 hours)     SLP Adult Swallow Evaluation     Row Name 11/03/21 1500                   Rehab Evaluation    Document Type evaluation  -TH        Subjective Information no complaints  -TH        Patient Observations cooperative; alert; agree to therapy  -TH        Care Plan Review evaluation/treatment results reviewed  -TH        Patient Effort excellent  -TH                  General Information    Patient Profile Reviewed yes  -TH        Pertinent History Of Current Problem Cardiogenic shock with multiorgan failure, coagulopathy, thrombocytopenia  -TH        Current Method of Nutrition NPO; nasogastric feedings  -TH        Precautions/Limitations, Vision WFL; for purposes of eval  -TH        Precautions/Limitations, Hearing WFL; for purposes of eval  -TH        Prior Level of Function-Communication WFL  -TH        Prior Level of Function-Swallowing no diet consistency restrictions  -TH        Plans/Goals Discussed with patient; family; agreed upon  -TH        Barriers to Rehab none identified  -TH         Patient's Goals for Discharge return home; return to regular diet  -TH                  Oral Motor Structure and Function    Dentition Assessment natural, present and adequate  -TH        Secretion Management WNL/WFL  -TH        Mucosal Quality moist, healthy  -TH                  Oral Musculature and Cranial Nerve Assessment    Oral Motor General Assessment WFL  -TH                  General Eating/Swallowing Observations    Respiratory Support Currently in Use room air  -TH        Eating/Swallowing Skills self-fed  -TH        Positioning During Eating upright 90 degree; upright in chair  -TH        Utensils Used spoon; cup; straw  -TH        Consistencies Trialed regular textures; pureed; thin liquids  -TH                  Clinical Swallow Eval    Clinical Swallow Evaluation Summary Patient seen for dysphagia evaluation on this date. Per RN he has been tolerating water trials, but wanted SLP consult before placing patient on a diet. Patient sitting in chair at bedside upon SLP arrival. Alert and willing to participate. Sister in law present for session. Oral [facility] exam WFL. Completed trials of ice chips, thin liquids, pureed, and solids. Patient tolerated approximately 18 oz of thin liquids via tsp/cup/straw without any overt s/sx of aspiration observed. Clear vocal quality post trials. He also appeared to tolerate pureed and solid trials without any problems. Patient with timely and adequate mastication of solids. No oral residue observed. At this time recommend patient is on a regular diet and thin liquids with medications whole as tolerated. Recommend upright position for all meals, slow rate, and small bites/sips. Discussed swallow precautions with patient and he verbalizes understanding. When discussing SLP roles, patient reported he feels that he has had some trouble with remembering initial hospital events, but overall reports he feels he’s back to baseline. Sister in law agreeable. If patient’s memory issues  persists recommend re-consulting SLP for further evaluation. At this time plan to sign off. Please re-consult if needed.     Discussed plan and recommendations with RN (Jose Guadalupe) and he verbalizes understanding as well. -TH                  Clinical Impression    SLP Swallowing Diagnosis swallow WFL  -TH        Plan for Continued Treatment (SLP) Plan to sign off at this time.  -TH                  Recommendations    Therapy Frequency (Swallow) evaluation only  -TH        SLP Diet Recommendation regular textures; thin liquids  -TH        Recommended Diagnostics No further SLP services recommended  -        Recommended Precautions and Strategies upright posture during/after eating; small bites of food and sips of liquid; general aspiration precautions  slow rate  -TH        Oral Care Recommendations Oral Care BID/PRN  -TH        SLP Rec. for Method of Medication Administration meds whole; meds crushed; as tolerated  -TH        Monitor for Signs of Aspiration yes; notify SLP if any concerns  -TH        Anticipated Discharge Disposition (SLP) unknown  -TH              User Key  (r) = Recorded By, (t) = Taken By, (c) = Cosigned By    Initials Name Effective Dates    Ginger Paniagua 06/16/21 -                 EDUCATION  The patient has been educated in the following areas:   Dysphagia (Swallowing Impairment) Oral Care/Hydration.              Time Calculation:    Time Calculation- SLP     Row Name 11/03/21 1520             Time Calculation- SLP    SLP Start Time 1400  -TH            User Key  (r) = Recorded By, (t) = Taken By, (c) = Cosigned By    Initials Name Provider Type    Ginger Paniagua Speech and Language Pathologist                Therapy Charges for Today     Code Description Service Date Service Provider Modifiers Qty    [id number] HC ST EVAL ORAL PHARYNG SWALLOW 4 11/3/2021 Ginger Riley GN 1               Ginger Riley  11/3/2021

## 2021-11-03 NOTE — PLAN OF CARE
Pt remains in CCU. AAOx4, very pleasant. Atrial fibrillation with controlled rate. 2L NC, oxygen saturations greater than 95%. Good UO through F/C. No bowel movement. Platelet count low this morning, no signs of bleeding, MD notified, no new orders at this time. Electrolytes replaced this morning per protocol. Sister updated at bedside. Will continue to monitor.    Goal Outcome Evaluation:     Problem: Skin Injury Risk Increased  Goal: Skin Health and Integrity  Outcome: Ongoing, Progressing  Intervention: Optimize Skin Protection  Recent Flowsheet Documentation  Taken 11/3/2021 0400 by Rosalee Frederick RN  Pressure Reduction Techniques:   frequent weight shift encouraged   heels elevated off bed  Head of Bed (HOB): HOB elevated  Pressure Reduction Devices: specialty bed utilized  Skin Protection:   tubing/devices free from skin contact   transparent dressing maintained   adhesive use limited   incontinence pads utilized  Taken 11/3/2021 0200 by Rosalee Frederick RN  Head of Bed ([facility]): HOB elevated  Taken 11/3/2021 0000 by Rosalee Frederick RN  Pressure Reduction Techniques:   frequent weight shift encouraged   heels elevated off bed  Head of Bed (HOB): HOB elevated  Pressure Reduction Devices: specialty bed utilized  Skin Protection:   adhesive use limited   incontinence pads utilized   transparent dressing maintained   tubing/devices free from skin contact  Taken 11/2/2021 2200 by Rosalee Frederick RN  Head of Bed ([facility]): HOB elevated  Taken 11/2/2021 2000 by Rosalee Frederick RN  Pressure Reduction Techniques:   frequent weight shift encouraged   heels elevated off bed   positioned off wounds   weight shift assistance provided   sit time limited to 2 hours  Pressure Reduction Devices: specialty bed utilized  Skin Protection:   adhesive use limited   incontinence pads utilized   transparent dressing maintained   skin-to-skin areas padded   skin-to-device areas padded     Problem: Fall Injury Risk  Goal: Absence of Fall and  Fall-Related Injury  Outcome: Ongoing, Progressing  Intervention: Identify and Manage Contributors to Fall Injury Risk  Recent Flowsheet Documentation  Taken 11/3/2021 0500 by Rosalee Frederick RN  Medication Review/Management: medications reviewed  Taken 11/3/2021 0400 by Rosalee Frederick RN  Medication Review/Management: medications reviewed  Taken 11/3/2021 0300 by Rosalee Frederick RN  Medication Review/Management: medications reviewed  Taken 11/3/2021 0200 by Rosalee Frederick RN  Medication Review/Management: medications reviewed  Taken 11/3/2021 0100 by Rosalee Frederick RN  Medication Review/Management: medications reviewed  Taken 11/3/2021 0000 by Rosalee Frederick RN  Medication Review/Management: medications reviewed  Taken 11/2/2021 2300 by Rosalee Frederick RN  Medication Review/Management: medications reviewed  Taken 11/2/2021 2200 by Rosalee Frederick RN  Medication Review/Management: medications reviewed  Taken 11/2/2021 2100 by Rosalee Frederick RN  Medication Review/Management: medications reviewed  Taken 11/2/2021 2000 by Rosalee Frederick RN  Medication Review/Management: medications reviewed  Intervention: Promote Injury-Free Environment  Recent Flowsheet Documentation  Taken 11/3/2021 0500 by Rosalee Frederick RN  Safety Promotion/Fall Prevention: safety round/check completed  Taken 11/3/2021 0400 by Rosalee Frederick RN  Safety Promotion/Fall Prevention: safety round/check completed  Taken 11/3/2021 0300 by Rosalee Frederick RN  Safety Promotion/Fall Prevention: safety round/check completed  Taken 11/3/2021 0200 by Rosalee Frederick RN  Safety Promotion/Fall Prevention: safety round/check completed  Taken 11/3/2021 0100 by Rosalee Frederick RN  Safety Promotion/Fall Prevention: safety round/check completed  Taken 11/3/2021 0000 by Rosalee Frederick RN  Safety Promotion/Fall Prevention: safety round/check completed  Taken 11/2/2021 2300 by Rosalee Frederick RN  Safety Promotion/Fall Prevention: safety round/check  completed  Taken 11/2/2021 2200 by Rosalee Frederick RN  Safety Promotion/Fall Prevention: safety round/check completed  Taken 11/2/2021 2100 by Rosalee Frederick RN  Safety Promotion/Fall Prevention: safety round/check completed  Taken 11/2/2021 2000 by Rosalee Frederick RN  Safety Promotion/Fall Prevention: safety round/check completed     Problem: Restraint, Nonbehavioral (Nonviolent)  Goal: Discontinuation Criteria Achieved  Outcome: Ongoing, Progressing  Intervention: Implement Least-restrictive Safety Strategies  Recent Flowsheet Documentation  Taken 11/3/2021 0500 by Rosalee Frederick RN  Medical Device Protection: IV pole/bag removed from visual field  Taken 11/3/2021 0400 by Rosalee Frederick RN  Medical Device Protection: IV pole/bag removed from visual field  Taken 11/3/2021 0300 by Rosalee Frederick RN  Medical Device Protection: IV pole/bag removed from visual field  Taken 11/3/2021 0200 by Rosalee Frederick RN  Medical Device Protection: IV pole/bag removed from visual field  Taken 11/3/2021 0100 by Rosalee Frederick RN  Medical Device Protection: IV pole/bag removed from visual field  Taken 11/3/2021 0000 by Rosalee Frederick RN  Medical Device Protection: IV pole/bag removed from visual field  Taken 11/2/2021 2300 by Rosalee Frederick RN  Medical Device Protection: IV pole/bag removed from visual field  Taken 11/2/2021 2200 by Rosalee Frederick RN  Medical Device Protection: IV pole/bag removed from visual field  Taken 11/2/2021 2100 by Rosalee Frederick RN  Medical Device Protection: IV pole/bag removed from visual field  Taken 11/2/2021 2000 by Rosalee Frederick RN  Medical Device Protection: IV pole/bag removed from visual field  Goal: Personal Dignity and Safety Maintained  Outcome: Ongoing, Progressing  Intervention: Protect Dignity, Rights, and Personal Wellbeing  Recent Flowsheet Documentation  Taken 11/3/2021 0400 by Rosalee Frederick RN  Trust Relationship/Rapport:   care explained   emotional support provided    reassurance provided   thoughts/feelings acknowledged   questions encouraged   empathic listening provided   questions answered   choices provided  Taken 11/2/2021 2000 by Rosalee Frederick RN  Trust Relationship/Rapport:   care explained   choices provided   emotional support provided   questions answered   empathic listening provided   questions encouraged   reassurance provided   thoughts/feelings acknowledged  Intervention: Protect Skin and Joint Integrity  Recent Flowsheet Documentation  Taken 11/3/2021 0400 by Rosalee Frederick RN  Body Position: position changed independently  Range of Motion: ROM (range of motion) performed  Taken 11/3/2021 0200 by Rosalee Frederick RN  Body Position: position changed independently  Taken 11/3/2021 0000 by Rosalee Frederick RN  Body Position: position changed independently  Range of Motion: active ROM (range of motion) encouraged  Taken 11/2/2021 2200 by Rosalee Frederick RN  Body Position: position changed independently  Taken 11/2/2021 2000 by Rosalee Frederick RN  Body Position: position changed independently  Range of Motion: ROM (range of motion) performed     Problem: Adult Inpatient Plan of Care  Goal: Plan of Care Review  Outcome: Ongoing, Progressing  Goal: Patient-Specific Goal (Individualized)  Outcome: Ongoing, Progressing  Goal: Absence of Hospital-Acquired Illness or Injury  Outcome: Ongoing, Progressing  Intervention: Identify and Manage Fall Risk  Recent Flowsheet Documentation  Taken 11/3/2021 0500 by Rosalee Frederick RN  Safety Promotion/Fall Prevention: safety round/check completed  Taken 11/3/2021 0400 by Rosalee Frederick RN  Safety Promotion/Fall Prevention: safety round/check completed  Taken 11/3/2021 0300 by Rosalee Frederick RN  Safety Promotion/Fall Prevention: safety round/check completed  Taken 11/3/2021 0200 by Rosalee Frederick RN  Safety Promotion/Fall Prevention: safety round/check completed  Taken 11/3/2021 0100 by Rosalee Frederick RN  Safety Promotion/Fall  Prevention: safety round/check completed  Taken 11/3/2021 0000 by Rosalee Frederick RN  Safety Promotion/Fall Prevention: safety round/check completed  Taken 11/2/2021 2300 by Rosalee Frederick RN  Safety Promotion/Fall Prevention: safety round/check completed  Taken 11/2/2021 2200 by Rosalee Frederick RN  Safety Promotion/Fall Prevention: safety round/check completed  Taken 11/2/2021 2100 by Rosalee Frederick RN  Safety Promotion/Fall Prevention: safety round/check completed  Taken 11/2/2021 2000 by Rosalee Frederick RN  Safety Promotion/Fall Prevention: safety round/check completed  Intervention: Prevent Skin Injury  Recent Flowsheet Documentation  Taken 11/3/2021 0400 by Rosalee Frederick RN  Body Position: position changed independently  Skin Protection:   tubing/devices free from skin contact   transparent dressing maintained   adhesive use limited   incontinence pads utilized  Taken 11/3/2021 0200 by Rosalee Frederick RN  Body Position: position changed independently  Taken 11/3/2021 0000 by Rosalee Frederick RN  Body Position: position changed independently  Skin Protection:   adhesive use limited   incontinence pads utilized   transparent dressing maintained   tubing/devices free from skin contact  Taken 11/2/2021 2200 by Rosalee Frederick RN  Body Position: position changed independently  Taken 11/2/2021 2000 by Rosalee Frederick RN  Body Position: position changed independently  Skin Protection:   adhesive use limited   incontinence pads utilized   transparent dressing maintained   skin-to-skin areas padded   skin-to-device areas padded  Intervention: Prevent and Manage VTE (venous thromboembolism) Risk  Recent Flowsheet Documentation  Taken 11/3/2021 0400 by Rosalee Frederick RN  VTE Prevention/Management:   bilateral   sequential compression devices on   bleeding risk factor(s) identified  Taken 11/3/2021 0000 by Rosalee Frederick RN  VTE Prevention/Management:   bilateral   sequential compression devices on   bleeding risk  factor(s) identified  Taken 11/2/2021 2000 by Rosalee Frederick RN  VTE Prevention/Management:   bilateral   sequential compression devices on  Intervention: Prevent Infection  Recent Flowsheet Documentation  Taken 11/3/2021 0500 by Rosalee Frederick RN  Infection Prevention: rest/sleep promoted  Taken 11/3/2021 0400 by Rosalee Frederick RN  Infection Prevention: rest/sleep promoted  Taken 11/3/2021 0300 by Rosalee Frederick RN  Infection Prevention: rest/sleep promoted  Taken 11/3/2021 0200 by Rosalee Frederick RN  Infection Prevention: rest/sleep promoted  Taken 11/3/2021 0100 by Rosalee Frederick RN  Infection Prevention: rest/sleep promoted  Taken 11/3/2021 0000 by Rosalee Frederick RN  Infection Prevention: rest/sleep promoted  Taken 11/2/2021 2300 by Rosalee Frederick RN  Infection Prevention: rest/sleep promoted  Taken 11/2/2021 2200 by Rosalee Frederick RN  Infection Prevention: rest/sleep promoted  Taken 11/2/2021 2100 by Rosalee Frederick RN  Infection Prevention: rest/sleep promoted  Taken 11/2/2021 2000 by Rosalee Frederick RN  Infection Prevention: rest/sleep promoted  Goal: Optimal Comfort and Wellbeing  Outcome: Ongoing, Progressing  Intervention: Provide Person-Centered Care  Recent Flowsheet Documentation  Taken 11/3/2021 0400 by Rosalee Frederick RN  Trust Relationship/Rapport:   care explained   emotional support provided   reassurance provided   thoughts/feelings acknowledged   questions encouraged   empathic listening provided   questions answered   choices provided  Taken 11/2/2021 2000 by Rosalee Frederick RN  Trust Relationship/Rapport:   care explained   choices provided   emotional support provided   questions answered   empathic listening provided   questions encouraged   reassurance provided   thoughts/feelings acknowledged  Goal: Readiness for Transition of Care  Outcome: Ongoing, Progressing     Problem: Adjustment to Device (Ventricular Assist Device)  Goal: Optimal Adjustment to Device  Outcome: Ongoing,  Progressing  Intervention: Optimize Psychosocial Response to VAD Implantation  Recent Flowsheet Documentation  Taken 11/3/2021 0400 by Rosalee Frederick RN  Family/Support System Care: support provided  Taken 11/2/2021 2000 by Rosalee Frederick RN  Family/Support System Care: support provided     Problem: Bleeding (Ventricular Assist Device)  Goal: Absence of Bleeding  Outcome: Ongoing, Progressing     Problem: Embolism (Ventricular Assist Device)  Goal: Absence of Embolism Signs and Symptoms  Outcome: Ongoing, Progressing  Intervention: Prevent and Manage Embolism Risk/Embolism  Recent Flowsheet Documentation  Taken 11/3/2021 0400 by Rosalee Frederick RN  VTE Prevention/Management:   bilateral   sequential compression devices on   bleeding risk factor(s) identified  Taken 11/3/2021 0000 by Rosalee Frederick RN  VTE Prevention/Management:   bilateral   sequential compression devices on   bleeding risk factor(s) identified  Taken 11/2/2021 2000 by Rosalee Frederick RN  VTE Prevention/Management:   bilateral   sequential compression devices on     Problem: Hemodynamic Instability (Ventricular Assist Device)  Goal: Optimal Blood Flow  Outcome: Ongoing, Progressing     Problem: Infection (Ventricular Assist Device)  Goal: Absence of Infection Signs and Symptoms  Outcome: Ongoing, Progressing  Intervention: Prevent or Manage Infection  Recent Flowsheet Documentation  Taken 11/3/2021 0500 by Rosalee Frederick RN  Infection Prevention: rest/sleep promoted  Taken 11/3/2021 0400 by Rosalee Frederick RN  Infection Prevention: rest/sleep promoted  Taken 11/3/2021 0300 by Rosalee Frederick RN  Infection Prevention: rest/sleep promoted  Taken 11/3/2021 0200 by Rosalee Frederick RN  Infection Prevention: rest/sleep promoted  Taken 11/3/2021 0100 by Rosalee Frederick RN  Infection Prevention: rest/sleep promoted  Taken 11/3/2021 0000 by Roaslee Frederick RN  Infection Prevention: rest/sleep promoted  Taken 11/2/2021 2300 by Rosalee Frederick  RN  Infection Prevention: rest/sleep promoted  Taken 11/2/2021 2200 by Rosalee Frederick RN  Infection Prevention: rest/sleep promoted  Taken 11/2/2021 2100 by Rosalee Frederick RN  Infection Prevention: rest/sleep promoted  Taken 11/2/2021 2000 by Rosalee Frederick RN  Infection Prevention: rest/sleep promoted     Problem: Communication Impairment (Mechanical Ventilation, Invasive)  Goal: Effective Communication  Outcome: Ongoing, Progressing  Intervention: Ensure Effective Communication  Recent Flowsheet Documentation  Taken 11/3/2021 0400 by Rosalee Frederick RN  Communication Enhancement Strategies:   one-step directions provided   repetition utilized  Taken 11/3/2021 0000 by Rosalee Frederick RN  Communication Enhancement Strategies:   one-step directions provided   repetition utilized  Taken 11/2/2021 2000 by Rosalee Frederick RN  Communication Enhancement Strategies:   one-step directions provided   repetition utilized     Problem: Device-Related Complication Risk (Mechanical Ventilation, Invasive)  Goal: Optimal Device Function  Outcome: Ongoing, Progressing  Intervention: Optimize Device Care and Function  Recent Flowsheet Documentation  Taken 11/3/2021 0500 by Rosalee Frederick RN  Airway Safety Measures:   mask at bedside   manual resuscitator at bedside   manual resuscitator/mask/valve in room   suction at bedside  Taken 11/3/2021 0400 by Rosalee Frederick RN  Airway Safety Measures:   mask at bedside   manual resuscitator at bedside   manual resuscitator/mask/valve in room   suction at bedside  Taken 11/3/2021 0300 by Rosalee Frederick RN  Airway Safety Measures:   mask at bedside   manual resuscitator at bedside   manual resuscitator/mask/valve in room   suction at bedside  Taken 11/3/2021 0200 by Rosalee Frederick RN  Airway Safety Measures:   mask at bedside   manual resuscitator at bedside   manual resuscitator/mask/valve in room   suction at bedside  Taken 11/3/2021 0100 by Rosalee Frederick RN  Airway Safety  Measures:   mask at bedside   manual resuscitator at bedside   manual resuscitator/mask/valve in room   suction at bedside  Taken 11/3/2021 0000 by Rosalee Frederick RN  Airway Safety Measures:   mask at bedside   manual resuscitator at bedside   manual resuscitator/mask/valve in room   suction at bedside  Taken 11/2/2021 2300 by Rosalee Frederick RN  Airway Safety Measures:   mask at bedside   manual resuscitator at bedside   manual resuscitator/mask/valve in room   suction at bedside  Taken 11/2/2021 2200 by Rosalee Frederick RN  Airway Safety Measures:   mask at bedside   manual resuscitator at bedside   manual resuscitator/mask/valve in room   suction at bedside  Taken 11/2/2021 2100 by Rosalee Frederick RN  Airway Safety Measures:   mask at bedside   manual resuscitator at bedside   manual resuscitator/mask/valve in room   suction at bedside  Taken 11/2/2021 2000 by Rosalee Frederick RN  Airway Safety Measures:   mask at bedside   manual resuscitator at bedside   manual resuscitator/mask/valve in room   suction at bedside     Problem: Inability to Wean (Mechanical Ventilation, Invasive)  Goal: Mechanical Ventilation Liberation  Outcome: Ongoing, Progressing  Intervention: Promote Extubation and Mechanical Ventilation Liberation  Recent Flowsheet Documentation  Taken 11/3/2021 0500 by Rosalee Frederick RN  Medication Review/Management: medications reviewed  Taken 11/3/2021 0400 by Rosalee Frederick RN  Environmental Support: calm environment promoted  Medication Review/Management: medications reviewed  Taken 11/3/2021 0300 by Rosalee Frederick RN  Medication Review/Management: medications reviewed  Taken 11/3/2021 0200 by Rosalee Frederick RN  Medication Review/Management: medications reviewed  Taken 11/3/2021 0100 by Rosalee Frederick RN  Medication Review/Management: medications reviewed  Taken 11/3/2021 0000 by Rosalee Frederick RN  Environmental Support: calm environment promoted  Medication Review/Management: medications  reviewed  Taken 11/2/2021 2300 by Rosalee Frederick RN  Medication Review/Management: medications reviewed  Taken 11/2/2021 2200 by Rosalee Frederick RN  Medication Review/Management: medications reviewed  Taken 11/2/2021 2100 by Rosalee Frederick RN  Medication Review/Management: medications reviewed  Taken 11/2/2021 2000 by Rosalee Frederick RN  Environmental Support: calm environment promoted  Sleep/Rest Enhancement:   awakenings minimized   consistent schedule promoted   noise level reduced   regular sleep/rest pattern promoted   relaxation techniques promoted   room darkened   therapeutic touch utilized  Medication Review/Management: medications reviewed     Problem: Nutrition Impairment (Mechanical Ventilation, Invasive)  Goal: Optimal Nutrition Delivery  Outcome: Ongoing, Progressing     Problem: Skin and Tissue Injury (Mechanical Ventilation, Invasive)  Goal: Absence of Device-Related Skin and Tissue Injury  Outcome: Ongoing, Progressing  Intervention: Maintain Skin and Tissue Health  Recent Flowsheet Documentation  Taken 11/3/2021 0400 by Rosalee Frederick RN  Device Skin Pressure Protection:   absorbent pad utilized/changed   adhesive use limited  Taken 11/3/2021 0000 by Rosalee Frederick RN  Device Skin Pressure Protection: absorbent pad utilized/changed  Taken 11/2/2021 2000 by Rosalee Frederick RN  Device Skin Pressure Protection:   absorbent pad utilized/changed   adhesive use limited     Problem: Ventilator-Induced Lung Injury (Mechanical Ventilation, Invasive)  Goal: Absence of Ventilator-Induced Lung Injury  Outcome: Ongoing, Progressing  Intervention: Prevent Ventilator-Associated Pneumonia  Recent Flowsheet Documentation  Taken 11/3/2021 0400 by Rosalee Frederick RN  Head of Bed (HOB): HOB elevated  Taken 11/3/2021 0200 by Rosalee Frederick RN  Head of Bed (HOB): HOB elevated  Taken 11/3/2021 0000 by Rosalee Frederick RN  Head of Bed (HOB): HOB elevated  Taken 11/2/2021 2200 by Rosalee Frederick RN  Head of Bed (HOB):  HOB elevated  Taken 11/2/2021 2000 by Rosalee Frederick, RN  Oral Care:   lip lubricant applied   swabbed with antiseptic solution   teeth brushed   tongue brushed

## 2021-11-03 NOTE — PROGRESS NOTES
[facility] Progress Note      Patient Name: Denis Chavez  : 1965  MRN: [id number]  Primary Care Physician:  Provider, No Known  Date of admission: 10/29/2021    Subjective     Interval History:   Follow up KRISHNA.  Off vent and impella out.  Tired. Denies pain. uop 2.6 liters. Weight down.  Voice weak, throat sore.  Taking water without difficulty.  No bm.     Review of Systems:   As noted above    Objective     Vitals:   Temp:  [97.3 °F (36.3 °C)-98.8 °F (37.1 °C)] 98.7 °F (37.1 °C)  Heart Rate:  [] 111  Resp:  [12-15] 12  BP: ()/(72-98) 143/94  Flow (L/min):  [2] 2  FiO2 (%):  [30 %-100 %] 30 %    Intake/Output Summary (Last 24 hours) at 11/3/2021 0740  Last data filed at 11/3/2021 0400  Gross per 24 hour   Intake 1534.01 ml   Output 2610 ml   Net -1075.99 ml       Physical Exam:    General Appearance: Awake, nasal 02.  Cortrak. Voice weak. Answers appropriately.    Skin: warm and dry  Neck: no jvd.   HEENT scleral icterus. Cortrak.    Lungs: Clear to auscultation, no wheezing.   Heart: Irreg, irreg tachy, no rub.   Abdomen: soft, nontender, nondistended. + bs.  Body wall edema.   : almanza  Extremities:1+ upper ext edema. R > L. 1+ lower ext edema.   Neuro: appropriate response to questions.  Moves all 4 ext.     Scheduled Meds:     chlorhexidine, 15 mL, Mouth/Throat, Q12H  metoprolol succinate XL, 50 mg, Oral, Q12H  pantoprazole, 40 mg, Intravenous, Daily  sodium chloride, 10 mL, Intravenous, Q12H      IV Meds:   heparin (porcine), 18 Units/kg/hr  propofol, 5-50 mcg/kg/min, Last Rate: Stopped (21 1426)        Results Reviewed:   I have personally reviewed the results from the time of this admission to 11/3/2021 07:40 EDT     Results from last 7 days   Lab Units 21  0316 21  0026 21  1806 21  0924 21  0923 21  0403 21  1522 21  0426   SODIUM mmol/L 143  --   --   --   --  142  --  139   POTASSIUM mmol/L 3.8  --   --   3.5  --  3.6   < > 3.4*   CHLORIDE mmol/L 105  --   --   --   --  102  --  103   CO2 mmol/L 30.4*  --   --   --   --  28.4  --  26.5   BUN mg/dL 21*  --   --   --   --  30*  --  37*   CREATININE mg/dL 1.02 1.06 1.28*  --    < > 1.63*   < > 1.85*  1.85*   CALCIUM mg/dL 8.1*  --   --   --   --  8.0*  --  7.4*   BILIRUBIN mg/dL 3.5*  --   --   --   --  5.4*  --  3.4*   ALK PHOS U/L 65  --   --   --   --  62  --  54   ALT (SGPT) U/L 1,781*  --   --   --   --  2,723*  --  3,451*   AST (SGOT) U/L 446*  --   --   --   --  985*  --  2,182*   GLUCOSE mg/dL 94  --   --   --   --  108*  --  94    < > = values in this interval not displayed.       Estimated Creatinine Clearance: 96.7 mL/min (by C-G formula based on SCr of 1.02 mg/dL).    Results from last 7 days   Lab Units 11/03/21  0316 10/31/21  0250 10/30/21  2227   MAGNESIUM mg/dL 1.6 2.1 2.2   PHOSPHORUS mg/dL 2.3*  --   --        Results from last 7 days   Lab Units 10/30/21  0356   URIC ACID mg/dL 16.8*       Results from last 7 days   Lab Units 11/03/21 0316 11/02/21 1806 11/02/21  0403 11/01/21  1522 11/01/21  0426   WBC 10*3/mm3 6.19 6.86 7.01 8.97 7.52   HEMOGLOBIN g/dL 11.4* 12.3* 13.0 13.9 12.2*   PLATELETS 10*3/mm3 36* 38* 56* 66* 64*       Results from last 7 days   Lab Units 11/03/21 0316 11/02/21 1806 11/02/21  0558 11/01/21  1522 11/01/21  0426   INR  1.27* 1.26* 1.29* 1.32* 1.65*       Assessment / Plan     ASSESSMENT:  1. KRISHNA.  Creatinine improved. .  Excellent urine output . Volume status by exam improved. Oral diuretic today.  Upper ext edema due to DVT IJ on right.     2. Nonischemic cardiogenic shock . Now off Impella. Biventricular failure. LV and RV improved on repeat echo yesterday.  Acute respiratory failure resolved off vent.   3. Shock liver.  Slowly improving.   4. TCP, coagulopathy with correction on mixing study. Dr. Melgoza following.   5. Atrial fibrillation. SP cardioversion x 2 without success.  Digoxin loaded 11/1.  Oral metoprolol  today per cardiology.  Rate overall improved.   6. RIJ DVT assoc with catheter, LUE superficial thrombophlebitis. Off  Heparin drip now.  Dr. Melgoza to address.   PLAN:  1. Oral diuretic today. Torsemide 40 mg.   2. DW sister, patient, Dr. Ma.   3. Mg and phos being replaced. I will replace Mg. intermittently rather than Mg protocol. Ok for phos protocol.     An Kirkpatrick MD  11/03/21  07:40 EDT    [facility]  [phone]

## 2021-11-03 NOTE — PROGRESS NOTES
[facility] INPATIENT PROGRESS NOTE    Length of Stay:  5 days    CHIEF COMPLAINT: Cardiogenic shock with multiorgan failure, coagulopathy, thrombocytopenia, right upper extremity/IJ DVT      SUBJECTIVE: Patient had Impella device removed yesterday, was extubated as well. There was discussion regarding initiation of full dose heparin however upon recheck of platelet count was found to be lower at 38,000 and heparin was held. Platelet count again this morning 36,000 and heparin again has been held. Patient has not experienced any bleeding issues.  This morning he is awake, alert and conversant with no complaints.      ROS:  Review of Systems a comprehensive review of systems was obtained with pertinent positive findings as noted in the interval history above.  All other systems negative.    OBJECTIVE:  Vitals:    11/02/21 2004 11/02/21 2057 11/03/21 0049 11/03/21 0501   BP: 143/94      Pulse: 111      Resp:       Temp:  97.8 °F (36.6 °C) 98.8 °F (37.1 °C) 98.7 °F (37.1 °C)   TempSrc:  Oral Oral Oral   SpO2:       Weight:    108 kg (237 lb 3.4 oz)   Height:             PHYSICAL EXAMINATION:  General: Alert and oriented x3 no distress  Neck: Right IJ central catheter has been removed  Chest/Lungs: Clear to auscultation bilaterally anteriorly  Heart: Irregular, borderline tachycardic, no murmurs gallops or rubs  Abdomen/GI: Soft nontender nondistended bowel sounds present  Extremities: There is generalized anasarca with 1+ bilateral lower extremity edema.  In the upper extremities there is 1+ edema in the right, trace edema left.    DIAGNOSTIC DATA:  Results Review:     I reviewed the patient's new clinical results.    Results from last 7 days   Lab Units 11/03/21  0316 11/02/21  1806 11/02/21  0403   WBC 10*3/mm3 6.19 6.86 7.01   HEMOGLOBIN g/dL 11.4* 12.3* 13.0   HEMATOCRIT % 35.8* 37.0* 39.9   PLATELETS 10*3/mm3 36* 38* 56*      Results from last 7 days   Lab Units 11/03/21  0316 11/03/21  0026  11/02/21  1806 11/02/21  0924 11/02/21  0923 11/02/21  0403 11/01/21  1522 11/01/21  0426   SODIUM mmol/L 143  --   --   --   --  142  --  139   POTASSIUM mmol/L 3.8  --   --  3.5  --  3.6   < > 3.4*   CHLORIDE mmol/L 105  --   --   --   --  102  --  103   CO2 mmol/L 30.4*  --   --   --   --  28.4  --  26.5   BUN mg/dL 21*  --   --   --   --  30*  --  37*   CREATININE mg/dL 1.02 1.06 1.28*  --    < > 1.63*   < > 1.85*  1.85*   CALCIUM mg/dL 8.1*  --   --   --   --  8.0*  --  7.4*   BILIRUBIN mg/dL 3.5*  --   --   --   --  5.4*  --  3.4*   ALK PHOS U/L 65  --   --   --   --  62  --  54   ALT (SGPT) U/L 1,781*  --   --   --   --  2,723*  --  3,451*   AST (SGOT) U/L 446*  --   --   --   --  985*  --  2,182*   GLUCOSE mg/dL 94  --   --   --   --  108*  --  94    < > = values in this interval not displayed.      Lab Results   Component Value Date    NEUTROABS 4.70 11/03/2021     Results from last 7 days   Lab Units 11/03/21  0316 11/02/21  1806 11/02/21  0558   INR  1.27* 1.26* 1.29*   APTT seconds 30.1 30.5 42.2*     Results from last 7 days   Lab Units 11/03/21  0316   MAGNESIUM mg/dL 1.6       Assessment/Plan   ASSESSMENT/PLAN:  This is a 56 y.o. male with:     Cardiogenic shock, atrial fibrillation  · Biventricular failure, etiology unclear  · Patient requiring pressor support  · Patient had transthoracic echocardiogram, and cardiac catheterization on 10/29/2021.  · Status post cardioversion x2, persistent atrial fibrillation  · Impella device placed, initiated heparin  · Patient had POOJA on 10/30/2021 with ejection fraction 26-30%, severe dilation right ventricular cavity, moderate mitral regurgitation, Impella device in left ventricle.  · Investigation regarding potential cardiac transplant per cardiology, does not appear feasible due to lack of social support.  · Patient currently off of pressors, continuing on milrinone  · Bedside echocardiogram 11/2/2021 with ejection fraction 50%, low normal, normal right  ventricular systolic function.  · Impella device removed on 11/2/2021, flat rate heparin discontinued as well  · Per Dr. Ma, patient will require ongoing anticoagulation due to atrial fibrillation.     *Acute hypoxic respiratory failure    · Patient previously required ventilatory support  · Patient extubated on 11/2/2021     *Shock liver  · Severe hepatic dysfunction secondary to cardiogenic shock  · Transaminases trending down with ALT 1781,  and bilirubin improved at 3.5    *KRISHNA  · Secondary to cardiogenic shock  · Nephrology following  · Creatinine has continued to improve, currently down to 1.02     *Coagulopathy with hypofibrinogenemia/DIC  · Likely secondary to shock liver and reduced synthetic function in addition to possible effects from Impella device producing consumption  · Heparin drip initiated with placement of Impella device  · Patient has received FFP, cryoprecipitate, vitamin K  · PT mixing study 10/31/2021 with decreased from 22.3 down to 14.9 (near correction indicating likely a factor deficiency)  · Reluctant to administer further FFP/cryoprecipitate in the setting of active thrombosis (see below).  · Subsequent improvement in coagulation parameters spontaneously  · Labs today with INR 1.27, PTT 30.1 (off heparin), fibrinogen 239. Platelet count remains low however at 36,000 (see below)    *Right upper extremity/IJ catheter associated/IJ DVT  · Doppler on 10/31/2021 with acute right upper extremity catheter associated IJ thrombus, acute left upper extremity superficial thrombophlebitis (cephalic).  · Patient was previously receiving fixed rate heparin with Impella device (heparin initiated 10/30/2021) until Impella device was removed on 11/2/2021  · Orders placed for initiation of full dose heparin on 11/2/2021 however heparin was not initiated due to platelet count of 38,000.  · Platelet count this morning remains low at 36,000, reluctant to initiate full dose heparin with  increased risk of bleeding. We will hold heparin drip for now and reimage with repeat Doppler right upper extremity to assess status of the thrombosis with slight increase in edema.  We will also check bilateral lower extremity Dopplers with increased edema today.    *Thrombocytopenia.   · No prior labs available  · On admission 10/29/2021 platelets 131,000.   · Platelets trended down, 71,000 on 10/30/2021  · Labs on 10/31/2021 with negative heparin antiplatelet antibody with KASEY 0.121 OD.  · CT abdomen and pelvis 10/29/2021 with normal spleen.  · Thrombocytopenia felt to be related to consumption initially due to shock/DIC with exacerbation by Impella device  · Patient received platelet transfusion 2 units around time of placement of Impella device  · On 10/31/2021 platelet count 44,000, received 1 additional unit platelets with increase to 79,000  · Impella device was removed on 11/2/2021  · Platelet count has declined further down to 38,000 on 11/2/2021 and 36,000 today. With resolution of DIC by coag parameters and removal of Impella device, it is unclear why the platelet count remains in this range. Anticipated that platelet count would be improving. We will therefore send off additional evaluation today with B12, folate, serum protein like pheresis, immunoelectrophoresis, quantitative immunoglobulins, free serum light chains, IPF. We will also repeat the heparin antiplatelet antibody and check a serotonin release assay confirmatory test. Given the thrombocytopenia we will hold on initiation of anticoagulation for the patient's DVT and atrial fibrillation.     *Erythrocytosis  · Hematocrit on admission was 53.3 10/29/2021  · Hematocrit has gradually declined since admission into the 12-13 range  · Current hematocrit 35.8    *Possible pneumonia/sepsis  · Patient received empiric Zosyn, completed course.     PLAN:  1. We will continue to hold on initiating full dose heparin drip due to ongoing thrombocytopenia  and risk of bleeding.  2. Reimage right upper extremity DVT today with Doppler  3. Bilateral lower extremity Dopplers today  4. Additional labs today with IPF, B12, folate, serum protein electrophoresis, immunoelectrophoresis, quantitative immunoglobulins, free serum light chains, repeat heparin antiplatelet antibody and check serotonin release assay.  5. Daily CBC, CMP, PT, PTT, fibrinogen    Patient discussed with Dr. Ma.  Discussed with patient and family at bedside.           Denis Melgoza MD

## 2021-11-03 NOTE — PLAN OF CARE
Goal Outcome Evaluation:              Outcome Summary: At this time recommend patient is on a regular diet and thin liquids with medications whole as tolerated. Recommend upright position for all meals, slow rate, and small bites/sips. Discussed swallow precautions with patient and he verbalizes understanding. When discussing SLP roles, patient reported he feels that he has had some trouble with remembering initial hospital events, but overall reports he feels he’s back to baseline. Sister in law agreeable. If patient’s memory issues persists recommend re-consulting SLP for further evaluation. At this time plan to sign off. Please re-consult if needed.

## 2021-11-03 NOTE — PROGRESS NOTES
"Denis Chavez  Interventional Team Note  1965 56 y.o.  [id number]      Patient Care Team:  Provider, No Known as PCP - General    CC: Cardiogenic shock, a fib    Interval History: DOING well much improved    Objective   Vital Signs  Temp:  [97.3 °F (36.3 °C)-98.8 °F (37.1 °C)] 98.7 °F (37.1 °C)  Heart Rate:  [] 111  Resp:  [12-15] 12  BP: ()/(77-98) 143/94  FiO2 (%):  [30 %-100 %] 30 %    Intake/Output Summary (Last 24 hours) at 11/3/2021 1130  Last data filed at 11/3/2021 1100  Gross per 24 hour   Intake 946.93 ml   Output 4700 ml   Net -3753.07 ml     Flowsheet Rows      First Filed Value   Admission Height 175.3 cm (69\") Documented at 10/29/2021 1717   Admission Weight 99.8 kg (220 lb) Documented at 10/29/2021 1433          Physical Exam:   General Appearance:   Arousable, in no acute distress   Lungs:     Clear to auscultation,BS are equal    Heart:    Normal S1 and S2, iRRR without murmur, gallop or rub   HEENT:    Sclerae are clear, no JVD or adenopathy   Abdomen:     Normal bowel sounds, soft nontender, nondistended, no HSM   Extremities:   Moves all extremities well, diffuse edema, no cyanosis, no             Redness, no rash     Medication Review:      chlorhexidine, 15 mL, Mouth/Throat, Q12H  metoprolol succinate XL, 50 mg, Oral, Q12H  pantoprazole, 40 mg, Intravenous, Daily  sodium chloride, 10 mL, Intravenous, Q12H  torsemide, 40 mg, Oral, Daily      propofol, 5-50 mcg/kg/min, Last Rate: Stopped (11/02/21 1426)          I reviewed the patient's new clinical results.  I personally viewed and interpreted the patient's EKG/Telemetry data    Assessment/Plan  Active Hospital Problems    Diagnosis  POA   • KRISHNA (acute kidney injury) (HCC) [N17.9]  Yes     Priority: High   • Hyperkalemia [E87.5]  Unknown     Priority: High   • Right upper quadrant abdominal pain [R10.11]  Unknown     Priority: High   • Elevated LFTs [R79.89]  Unknown     Priority: High   • Cardiogenic shock (HCC) [R57.0]  Yes     " Priority: High   • Other specified anemias [D64.89]  Unknown   • Hypofibrinogenemia (HCC) [D68.8]  Unknown   • Coagulopathy (HCC) [D68.9]  Unknown   • Thrombocytopenia (HCC) [D69.6]  Unknown   • Atrial fibrillation with rapid ventricular response (HCC) [I48.91]  Yes   • Mitral insufficiency, acute [I34.0]  Yes      Resolved Hospital Problems   No resolved problems to display.     Well he is made a tremendous recovery his access sites look good I think probably this is looking like it could be a tachycardia mediated cardiomyopathy we will move him out of the unit today increase his activity hopefully get his NG tube out start eating get an MRI tomorrow he does need anticoagulation and is in A. fib but his platelets are little low    Get Cunha MD  11/03/21  11:30 EDT

## 2021-11-03 NOTE — PROGRESS NOTES
"      [facility]         Dr Hicks Sayied   LOS: 5 days   Patient Care Team:  Provider, No Known as PCP - General    Chief Complaint: Acute liver failure suspected salicylate toxicity with cardiogenic shock coagulopathy lactic acidosis thrombocytopenia multiple issues ongoing    Interval History: Extubated yesterday.  Currently on nasal cannula oxygen.  Looks much better this morning.  I took him off nasal cannula oxygen his sats are maintaining above 90%.    REVIEW OF SYSTEMS:   No chest pain no shortness of breath at this time    Ventilator/Non-Invasive Ventilation Settings (From admission, onward)           Cannula oxygen    Vital Signs  Temp:  [97.3 °F (36.3 °C)-98.8 °F (37.1 °C)] 98.7 °F (37.1 °C)  Heart Rate:  [] 111  Resp:  [12-15] 12  BP: ()/(77-98) 143/94  FiO2 (%):  [30 %-100 %] 30 %    Intake/Output Summary (Last 24 hours) at 11/3/2021 1020  Last data filed at 11/3/2021 0400  Gross per 24 hour   Intake 946.93 ml   Output 1800 ml   Net -853.07 ml     Flowsheet Rows      First Filed Value   Admission Height 175.3 cm (69\") Documented at 10/29/2021 1717   Admission Weight 99.8 kg (220 lb) Documented at 10/29/2021 1433          Physical Exam:  Patient is examined using the personal protective equipment as per guidelines from infection control for this particular patient as enacted.  Hand hygiene was performed before and after patient interaction.   General Appearance:   Awake no distress following simple commands  Neck midline trachea, no thyromegaly   Lungs:    Diminished breath sounds on the bases bilaterally    Heart:    Regular rhythm and normal rate, normal S1 and S2, no            murmur, no gallop, no rub, no click   Chest Wall:    No abnormalities observed   Abdomen:    Soft no masses felt   Extremities:  1+ edema, no cyanosis, no             redness.  Perfusion of lower extremity has improved  CNS no focal deficit simple commands  Skin no rashes no nodules  Musculoskeletal " no cyanosis no clubbing normal range of motion     Results Review:        Results from last 7 days   Lab Units 11/03/21  0316 11/03/21  0026 11/02/21  1806 11/02/21  0924 11/02/21  0923 11/02/21  0403 11/02/21  0403 11/01/21  1522 11/01/21  0426   SODIUM mmol/L 143  --   --   --   --   --  142  --  139   POTASSIUM mmol/L 3.8  --   --  3.5  --   --  3.6   < > 3.4*   CHLORIDE mmol/L 105  --   --   --   --   --  102  --  103   CO2 mmol/L 30.4*  --   --   --   --   --  28.4  --  26.5   BUN mg/dL 21*  --   --   --   --   --  30*  --  37*   CREATININE mg/dL 1.02 1.06 1.28*  --    < >  --  1.63*   < > 1.85*  1.85*   GLUCOSE mg/dL 94  --   --   --   --    < > 108*  --  94   CALCIUM mg/dL 8.1*  --   --   --   --   --  8.0*  --  7.4*    < > = values in this interval not displayed.     Results from last 7 days   Lab Units 10/31/21  1527 10/31/21  0825 10/30/21  2227 10/30/21  1719 10/29/21  1247   CK TOTAL U/L 339* 347* 351*   < >  --    TROPONIN T ng/mL  --   --   --   --  0.038*    < > = values in this interval not displayed.     Results from last 7 days   Lab Units 11/03/21 0316 11/02/21 1806 11/02/21  0403   WBC 10*3/mm3 6.19 6.86 7.01   HEMOGLOBIN g/dL 11.4* 12.3* 13.0   HEMATOCRIT % 35.8* 37.0* 39.9   PLATELETS 10*3/mm3 36* 38* 56*     Results from last 7 days   Lab Units 11/03/21 0316 11/02/21 1806 11/02/21  0558   INR  1.27* 1.26* 1.29*   APTT seconds 30.1 30.5 42.2*         Results from last 7 days   Lab Units 11/03/21  0316   MAGNESIUM mg/dL 1.6     Results from last 7 days   Lab Units 11/02/21  0403   TRIGLYCERIDES mg/dL 102     Results from last 7 days   Lab Units 11/02/21  0843   PH, ARTERIAL pH units 7.448   PO2 ART mm Hg 95.1   PCO2, ARTERIAL mm Hg 46.1*   HCO3 ART mmol/L 31.9*       I reviewed the patient's new clinical results.  I personally viewed and interpreted the patient's chest x-ray.        Medication Review:   chlorhexidine, 15 mL, Mouth/Throat, Q12H  metoprolol succinate XL, 50 mg, Oral,  Q12H  pantoprazole, 40 mg, Intravenous, Daily  sodium chloride, 10 mL, Intravenous, Q12H  torsemide, 40 mg, Oral, Daily        propofol, 5-50 mcg/kg/min, Last Rate: Stopped (11/02/21 1426)        ASSESSMENT:   1. Acute liver failure: Suspect salicylate toxicity per history, subacute/acute on chronic.  2. Acute cardiogenic shock  3. Acute respiratory failure on vent  4. A. fib with RVR  5. KRISHNA: Mostly volume depletion due to liver failure  6. Electrolytes disturbance: Acute life-threatening hyperkalemia.  Hyponatremia.  7. Coagulopathy, secondary to acute liver disease  8. Lactic acidosis, secondary to liver failure  9. Slightly evaded troponin: Likely secondary to KRISHNA  10. Erythrocytosis, likely more hemoconcentration  11. Bilateral pleural effusion, mild  12. Thrombocytopenia  13. Subcentimetric bilateral calcified nodules  14. Altered mental status  15. Catheter associated right IJ DVT      PLAN:  Extubated yesterday and tolerating extubation well.  I will wean off oxygen to keep sats above 90%.  Cardiogenic shock much improved.  Cardiology currently following  Improving kidney function and hyperkalemia now resolved.  Appreciate input from nephrology  Coagulopathy with thrombocytopenia.  Platelet counts gradually decreasing.  Hematology currently following  Electrolyte management per nephrology.  Creatinine stable and improving  I think this was all cardiogenic shock with CHF.  Attics have been discontinued  Diet per speech.  Stable to transfer out of the ICU today        Kurt Evangelista MD  11/03/21  10:20 EDT

## 2021-11-03 NOTE — PROGRESS NOTES
[facility]  Inpatient Progress Note    Reason for Follow Up: Elevated LFTs    Subjective     Interval History:   Transaminase levels show continued improvement, daily basis.  Bilirubin also improved.  Patient extubated and interactive.  Discussed with family at bedside.  Core track tube in place with trickle feeds.    Current Facility-Administered Medications:   •  atropine sulfate injection 0.5 mg, 0.5 mg, Intravenous, Q5 Min PRN, Get Cunha MD  •  atropine sulfate injection 0.5 mg, 0.5 mg, Intravenous, Q5 Min PRN, Get Cunha MD  •  calcium gluconate 1g/50ml 0.675% NaCl IV SOLN, 1 g, Intravenous, PRN **AND** calcium gluconate 6 g in sodium chloride 0.9 % 500 mL IVPB, 6 g, Intravenous, PRN **AND** Calcium, , , PRN, Lulu Rico MD  •  chlorhexidine (PERIDEX) 0.12 % solution 15 mL, 15 mL, Mouth/Throat, Q12H, Get Cunha MD, 15 mL at 11/02/21 2006  •  fentaNYL citrate (PF) (SUBLIMAZE) injection 50 mcg, 50 mcg, Intravenous, Q30 Min PRN, Get Cunha MD, 50 mcg at 11/01/21 1242  •  HYDROcodone-acetaminophen (NORCO) 5-325 MG per tablet 1 tablet, 1 tablet, Oral, Q4H PRN, Get Cunha MD  •  metoprolol succinate XL (TOPROL-XL) 24 hr tablet 50 mg, 50 mg, Oral, Q12H, Padmini Ma MD  •  morphine injection 1 mg, 1 mg, Intravenous, Q4H PRN **AND** naloxone (NARCAN) injection 0.4 mg, 0.4 mg, Intravenous, Q5 Min PRN, Get Cunha MD  •  pantoprazole (PROTONIX) injection 40 mg, 40 mg, Intravenous, Daily, Get Cunha MD, 40 mg at 11/02/21 0833  •  potassium & sodium phosphates (PHOS-NAK) 280-160-250 MG packet - for Phosphorus less than 1.25 mg/dL, 2 packet, Oral, Q6H PRN **OR** potassium & sodium phosphates (PHOS-NAK) 280-160-250 MG packet - for Phosphorus 1.25 - 2.5 mg/dL, 2 packet, Oral, Q6H PRN, Lulu Rico MD, 2 packet at 11/03/21 0615  •  potassium chloride (K-DUR,KLOR-CON) ER tablet 40 mEq, 40 mEq, Oral, PRN **OR** potassium chloride  (KLOR-CON) packet 40 mEq, 40 mEq, Oral, PRN, 40 mEq at 11/01/21 2224 **OR** potassium chloride 10 mEq in 100 mL IVPB, 10 mEq, Intravenous, Q1H PRN, Get Cunha MD, Last Rate: 100 mL/hr at 11/02/21 2007, 10 mEq at 11/02/21 2007  •  propofol (DIPRIVAN) infusion 10 mg/mL 100 mL, 5-50 mcg/kg/min, Intravenous, Titrated, Get Cunha MD, Stopped at 11/02/21 1426  •  sodium chloride 0.9 % flush 10 mL, 10 mL, Intravenous, PRN, Get Cunha MD  •  [COMPLETED] Insert peripheral IV, , , Once **AND** sodium chloride 0.9 % flush 10 mL, 10 mL, Intravenous, PRN, Get Cunha MD  •  sodium chloride 0.9 % flush 10 mL, 10 mL, Intravenous, Q12H, Get Cunha MD, 10 mL at 11/02/21 2006  •  sodium chloride 0.9 % infusion 250 mL, 250 mL, Intravenous, Once PRN, Get Cunha MD  •  torsemide (DEMADEX) tablet 40 mg, 40 mg, Oral, Daily, An Kirkpatrick MD  Review of Systems:    All systems were reviewed and negative except for:  Gastrointestinal: positive for  See HPI    Objective     Vital Signs  Temp:  [97.3 °F (36.3 °C)-98.8 °F (37.1 °C)] 98.7 °F (37.1 °C)  Heart Rate:  [] 111  Resp:  [12-15] 12  BP: ()/(72-98) 143/94  FiO2 (%):  [30 %-100 %] 30 %  Body mass index is 35.01 kg/m².    Intake/Output Summary (Last 24 hours) at 11/3/2021 0758  Last data filed at 11/3/2021 0400  Gross per 24 hour   Intake 1534.01 ml   Output 2610 ml   Net -1075.99 ml     No intake/output data recorded.     Physical Exam:   General: patient awake, alert and cooperative   Eyes: Normal lids and lashes, no scleral icterus   Neck: supple, normal ROM   Skin: warm and dry, not jaundiced   Cardiovascular: regular rhythm and rate, no murmurs auscultated   Pulm: clear to auscultation bilaterally, regular and unlabored   Abdomen: soft, nontender, nondistended; normal bowel sounds   Extremities: no rash or edema   Psychiatric: Normal mood and behavior; memory intact     Results Review:     I reviewed the patient's new clinical  results.    Results from last 7 days   Lab Units 11/03/21 0316 11/02/21 1806 11/02/21  0403   WBC 10*3/mm3 6.19 6.86 7.01   HEMOGLOBIN g/dL 11.4* 12.3* 13.0   HEMATOCRIT % 35.8* 37.0* 39.9   PLATELETS 10*3/mm3 36* 38* 56*     Results from last 7 days   Lab Units 11/03/21 0316 11/03/21  0026 11/02/21 1806 11/02/21  0924 11/02/21  0923 11/02/21  0403 11/01/21  1522 11/01/21  0426   SODIUM mmol/L 143  --   --   --   --  142  --  139   POTASSIUM mmol/L 3.8  --   --  3.5  --  3.6   < > 3.4*   CHLORIDE mmol/L 105  --   --   --   --  102  --  103   CO2 mmol/L 30.4*  --   --   --   --  28.4  --  26.5   BUN mg/dL 21*  --   --   --   --  30*  --  37*   CREATININE mg/dL 1.02 1.06 1.28*  --    < > 1.63*   < > 1.85*  1.85*   CALCIUM mg/dL 8.1*  --   --   --   --  8.0*  --  7.4*   BILIRUBIN mg/dL 3.5*  --   --   --   --  5.4*  --  3.4*   ALK PHOS U/L 65  --   --   --   --  62  --  54   ALT (SGPT) U/L 1,781*  --   --   --   --  2,723*  --  3,451*   AST (SGOT) U/L 446*  --   --   --   --  985*  --  2,182*   GLUCOSE mg/dL 94  --   --   --   --  108*  --  94    < > = values in this interval not displayed.     Results from last 7 days   Lab Units 11/03/21 0316 11/02/21 1806 11/02/21  0558   INR  1.27* 1.26* 1.29*     Lab Results   Lab Value Date/Time    LIPASE 32 10/29/2021 1247       Radiology:  XR Chest 1 View   Final Result   No significant interval change.       This report was finalized on 10/31/2021 5:16 AM by Dr. Henny Galvez M.D.          XR Chest 1 View   Final Result      XR Chest 1 View   Final Result      XR Chest 1 View   Final Result      CT Chest Without Contrast Diagnostic   Final Result   1. The findings suggest passive hepatic congestion with a tiny amount of   free fluid and 3rd spacing of fluid within the body wall. There are also   small-moderate-sized bilateral pleural effusions.   2. Moderately large left inguinal hernia containing a long segment of   sigmoid colon and bladder dome without  obstruction or incarceration.   3. Cardiomegaly.       Discussed with Dr. Gillespie.       This report was finalized on 10/29/2021 4:23 PM by Dr. Misti Georges M.D.          CT Abdomen Pelvis Without Contrast   Final Result   1. The findings suggest passive hepatic congestion with a tiny amount of   free fluid and 3rd spacing of fluid within the body wall. There are also   small-moderate-sized bilateral pleural effusions.   2. Moderately large left inguinal hernia containing a long segment of   sigmoid colon and bladder dome without obstruction or incarceration.   3. Cardiomegaly.       Discussed with Dr. Gillespie.       This report was finalized on 10/29/2021 4:23 PM by Dr. Misti Georges M.D.          XR Chest 1 View   Final Result   Subcentimeter bilateral calcified nodules are present   consistent with calcified granulomas. Heart size is enlarged. There is   no convincing evidence for active disease in the chest.       This report was finalized on 10/29/2021 12:27 PM by Dr. Kip Garcia M.D.          MRI Cardiac Function Complete With & Without Morphology    (Results Pending)       Assessment/Plan     Patient Active Problem List   Diagnosis   • Atrial fibrillation with rapid ventricular response (HCC)   • KRISHNA (acute kidney injury) (HCC)   • Hyperkalemia   • Right upper quadrant abdominal pain   • Elevated LFTs   • Mitral insufficiency, acute   • Cardiogenic shock (HCC)   • Coagulopathy (HCC)   • Thrombocytopenia (HCC)   • Other specified anemias   • Hypofibrinogenemia (HCC)       Assessment:  1. Elevated LFTs: Suspect shock liver, showing gradual but progressive improvement.  2. Nutrition: Core track in place.  Subsequent extubation he may be able to initiate p.o. intake.  Will defer to nutritionist.      Plan:  · Monitor LFTs intermittently to confirm resolution  · Will sign off but available as needed.  I discussed the patients findings and my recommendations with patient, family and nursing staff.    Rai ADKINS  MD Perla

## 2021-11-03 NOTE — PROGRESS NOTES
LOS: 5 days   Patient Care Team:  Provider, No Known as PCP - General    Chief Complaint:     F/u a fib and cardiogenic shock    Interval History:     He is weak, has an NG tube in place.  He is supposed to have swallow evaluation today but is swallowing pills without difficulty with sips of water.  He has no chest pain or difficulty breathing.  He does not feel his heart racing or skipping.  He does admit that he been in atrial fibrillation for probably a month prior to admission.  He has no orthopnea or PND.  He has no nausea or vomiting.  Is no abdominal pain.      Echo 11/2/21  Interpretation Summary    · Left ventricular systolic function is low normal.  · The following left ventricular wall segments are hypokinetic: basal inferoseptal, mid inferoseptal, mid anteroseptal and basal inferoseptal.        Objective   Vital Signs  Temp:  [97.3 °F (36.3 °C)-98.8 °F (37.1 °C)] 98.7 °F (37.1 °C)  Heart Rate:  [] 111  Resp:  [12-15] 12  BP: ()/(72-98) 143/94  FiO2 (%):  [30 %-100 %] 30 %    Intake/Output Summary (Last 24 hours) at 11/3/2021 0703  Last data filed at 11/3/2021 0400  Gross per 24 hour   Intake 1534.01 ml   Output 2610 ml   Net -1075.99 ml       Comfortable NAD  Neck supple, no JVD or thyromegaly appreciated  S1/S2 irregular no m/r/g  Lungs CTA B, normal effort  Abdomen S/NT/ND (+) BS, no HSM appreciated  Extremities warm, no clubbing, cyanosis, 2+ LE edema  No visible or palpable skin lesions  A/Ox4, mood and affect appropriate    Results Review:      Results from last 7 days   Lab Units 11/03/21  0316 11/03/21  0026 11/02/21  1806 11/02/21  0924 11/02/21  0923 11/02/21  0403 11/02/21  0403 11/01/21  1522 11/01/21  0426   SODIUM mmol/L 143  --   --   --   --   --  142  --  139   POTASSIUM mmol/L 3.8  --   --  3.5  --   --  3.6   < > 3.4*   CHLORIDE mmol/L 105  --   --   --   --   --  102  --  103   CO2 mmol/L 30.4*  --   --   --   --   --  28.4  --  26.5   BUN mg/dL 21*  --   --   --   --    --  30*  --  37*   CREATININE mg/dL 1.02 1.06 1.28*  --    < >  --  1.63*   < > 1.85*  1.85*   GLUCOSE mg/dL 94  --   --   --   --    < > 108*  --  94   CALCIUM mg/dL 8.1*  --   --   --   --   --  8.0*  --  7.4*    < > = values in this interval not displayed.     Results from last 7 days   Lab Units 10/31/21  1527 10/31/21  0825 10/30/21  2227 10/30/21  1719 10/29/21  1247   CK TOTAL U/L 339* 347* 351*   < >  --    TROPONIN T ng/mL  --   --   --   --  0.038*    < > = values in this interval not displayed.     Results from last 7 days   Lab Units 11/03/21  0316 11/02/21  1806 11/02/21  0403   WBC 10*3/mm3 6.19 6.86 7.01   HEMOGLOBIN g/dL 11.4* 12.3* 13.0   HEMATOCRIT % 35.8* 37.0* 39.9   PLATELETS 10*3/mm3 36* 38* 56*     Results from last 7 days   Lab Units 11/03/21  0316 11/02/21  1806 11/02/21  0558   INR  1.27* 1.26* 1.29*   APTT seconds 30.1 30.5 42.2*         Results from last 7 days   Lab Units 11/03/21  0316   MAGNESIUM mg/dL 1.6     Results from last 7 days   Lab Units 11/02/21  0403   TRIGLYCERIDES mg/dL 102       I reviewed the patient's new clinical results.  I personally viewed and interpreted the patient's EKG/Telemetry data        Medication Review:   chlorhexidine, 15 mL, Mouth/Throat, Q12H  metoprolol tartrate, 50 mg, Oral, Q12H  pantoprazole, 40 mg, Intravenous, Daily  sodium chloride, 10 mL, Intravenous, Q12H        heparin (porcine), 18 Units/kg/hr  propofol, 5-50 mcg/kg/min, Last Rate: Stopped (11/02/21 1426)        Assessment/Plan       Atrial fibrillation with rapid ventricular response (HCC)    KRISHNA (acute kidney injury) (HCC)    Hyperkalemia    Right upper quadrant abdominal pain    Elevated LFTs    Mitral insufficiency, acute    Cardiogenic shock (HCC)    Coagulopathy (HCC)    Thrombocytopenia (HCC)    Other specified anemias    Hypofibrinogenemia (HCC)    1. cardiogenic shock with severe biventricular failure -likely tachycardia mediated.  Impella DC'd on 11/2/2021. LV and RV have  improved.  2. Severe mitral insufficiency - functional.  Resolved as cardiac function has improved  3. Transaminitis, shock liver due to poor cardiac output - improving  4. acute renal failure -resolved, nephrology following.  5. Atrial fibrillation with rapid ventricular response on arrival-  has been cardioverted twice but went back into atrial fibrillation  - rate controlled.   6. Acute respiratory failure - now off vent, extubated 11/2/21.    7. Coagulopathy-resolved   8. thrombocytopenia.-Worsening, hematology following, is not on heparin now as was stopped prior to pulling impella. Will not restart.   9. Probable untreated KELSY.   10. Acute left upper extremity/IJ DVT due to catheter.       Likely has sleep apnea according to the patient.  Is also possible that he has had uncontrolled hypertension.  He really has not been exercising at home.  He has not seen a physician in years prior to this.  Will set up for cardiac MRI to be done tomorrow.  Nephrology is adding Lasix today which she needs because of his edema.  Because of his cardiomyopathy and the fact that he is swallowing well, I am going to change him to metoprolol succinate 50 mg twice daily. Will need AC when OK with hematology.     It is okay for him to go to a monitored bed later today.    Spoke with nursing and family and Dr. Kirkpatrick and Dr. Melgoza.     Padmini Ma MD  11/03/21  07:03 EDT

## 2021-11-03 NOTE — PROGRESS NOTES
Vascular lab preliminary, bedside exam in the CCU    Bilateral lower extremity venous duplex exam is complete  Right upper extremity venous duplex exam is complete      There is acute DVT and SVT in the right arm     There is acute DVT in the right and left leg.    There is an incidental finding of a left groin pseudoaneurysm    There is an incidental finding of right arm radial artery occlusion          Spoke with The patients nurse Braden MALONEY at 1:49pm    Final report to follow

## 2021-11-04 LAB
ALBUMIN SERPL ELPH-MCNC: 2.8 G/DL (ref 2.9–4.4)
ALBUMIN SERPL-MCNC: 3.3 G/DL (ref 3.5–5.2)
ALBUMIN/GLOB SERPL: 1.5 {RATIO} (ref 0.7–1.7)
ALBUMIN/GLOB SERPL: 1.6 G/DL
ALP SERPL-CCNC: 105 U/L (ref 39–117)
ALPHA1 GLOB SERPL ELPH-MCNC: 0.3 G/DL (ref 0–0.4)
ALPHA2 GLOB SERPL ELPH-MCNC: 0.3 G/DL (ref 0.4–1)
ALT SERPL W P-5'-P-CCNC: 1352 U/L (ref 1–41)
ANION GAP SERPL CALCULATED.3IONS-SCNC: 9.3 MMOL/L (ref 5–15)
APTT PPP: 27.4 SECONDS (ref 22.7–35.4)
AST SERPL-CCNC: 245 U/L (ref 1–40)
B-GLOBULIN SERPL ELPH-MCNC: 0.7 G/DL (ref 0.7–1.3)
BASOPHILS # BLD AUTO: 0.02 10*3/MM3 (ref 0–0.2)
BASOPHILS NFR BLD AUTO: 0.3 % (ref 0–1.5)
BILIRUB SERPL-MCNC: 2.9 MG/DL (ref 0–1.2)
BUN SERPL-MCNC: 19 MG/DL (ref 6–20)
BUN/CREAT SERPL: 21.8 (ref 7–25)
CALCIUM SPEC-SCNC: 8.5 MG/DL (ref 8.6–10.5)
CHLORIDE SERPL-SCNC: 98 MMOL/L (ref 98–107)
CO2 SERPL-SCNC: 30.7 MMOL/L (ref 22–29)
CREAT SERPL-MCNC: 0.87 MG/DL (ref 0.76–1.27)
DEPRECATED RDW RBC AUTO: 50.6 FL (ref 37–54)
EOSINOPHIL # BLD AUTO: 0.14 10*3/MM3 (ref 0–0.4)
EOSINOPHIL NFR BLD AUTO: 2 % (ref 0.3–6.2)
ERYTHROCYTE [DISTWIDTH] IN BLOOD BY AUTOMATED COUNT: 14.4 % (ref 12.3–15.4)
FIBRINOGEN PPP-MCNC: 270 MG/DL (ref 219–464)
GAMMA GLOB SERPL ELPH-MCNC: 0.6 G/DL (ref 0.4–1.8)
GFR SERPL CREATININE-BSD FRML MDRD: 90 ML/MIN/1.73
GLOBULIN SER-MCNC: 1.9 G/DL (ref 2.2–3.9)
GLOBULIN UR ELPH-MCNC: 2.1 GM/DL
GLUCOSE BLDC GLUCOMTR-MCNC: 159 MG/DL (ref 70–130)
GLUCOSE SERPL-MCNC: 97 MG/DL (ref 65–99)
HCT VFR BLD AUTO: 40.5 % (ref 37.5–51)
HGB BLD-MCNC: 13.2 G/DL (ref 13–17.7)
IGA SERPL-MCNC: 226 MG/DL (ref 90–386)
IGG SERPL-MCNC: 710 MG/DL (ref 603–1613)
IGM SERPL-MCNC: 49 MG/DL (ref 20–172)
INR PPP: 1.18 (ref 0.9–1.1)
INTERPRETATION SERPL IEP-IMP: ABNORMAL
KAPPA LC FREE SER-MCNC: 19.7 MG/L (ref 3.3–19.4)
KAPPA LC FREE/LAMBDA FREE SER: 0.94 {RATIO} (ref 0.26–1.65)
LABORATORY COMMENT REPORT: ABNORMAL
LAMBDA LC FREE SERPL-MCNC: 20.9 MG/L (ref 5.7–26.3)
LYMPHOCYTES # BLD AUTO: 0.73 10*3/MM3 (ref 0.7–3.1)
LYMPHOCYTES NFR BLD AUTO: 10.4 % (ref 19.6–45.3)
M PROTEIN SERPL ELPH-MCNC: ABNORMAL G/DL
MAGNESIUM SERPL-MCNC: 1.8 MG/DL (ref 1.6–2.6)
MCH RBC QN AUTO: 31.2 PG (ref 26.6–33)
MCHC RBC AUTO-ENTMCNC: 32.6 G/DL (ref 31.5–35.7)
MCV RBC AUTO: 95.7 FL (ref 79–97)
MONOCYTES # BLD AUTO: 0.94 10*3/MM3 (ref 0.1–0.9)
MONOCYTES NFR BLD AUTO: 13.4 % (ref 5–12)
NEUTROPHILS NFR BLD AUTO: 5.16 10*3/MM3 (ref 1.7–7)
NEUTROPHILS NFR BLD AUTO: 73.3 % (ref 42.7–76)
PHOSPHATE SERPL-MCNC: 2.7 MG/DL (ref 2.5–4.5)
PLATELET # BLD AUTO: 46 10*3/MM3 (ref 140–450)
PMV BLD AUTO: 12 FL (ref 6–12)
POTASSIUM SERPL-SCNC: 3.6 MMOL/L (ref 3.5–5.2)
PROT SERPL-MCNC: 4.7 G/DL (ref 6–8.5)
PROT SERPL-MCNC: 5.4 G/DL (ref 6–8.5)
PROTHROMBIN TIME: 14.8 SECONDS (ref 11.7–14.2)
RBC # BLD AUTO: 4.23 10*6/MM3 (ref 4.14–5.8)
SODIUM SERPL-SCNC: 138 MMOL/L (ref 136–145)
WBC # BLD AUTO: 7.03 10*3/MM3 (ref 3.4–10.8)

## 2021-11-04 PROCEDURE — 85730 THROMBOPLASTIN TIME PARTIAL: CPT | Performed by: INTERNAL MEDICINE

## 2021-11-04 PROCEDURE — 25010000002 MAGNESIUM SULFATE 2 GM/50ML SOLUTION: Performed by: INTERNAL MEDICINE

## 2021-11-04 PROCEDURE — 83735 ASSAY OF MAGNESIUM: CPT | Performed by: INTERNAL MEDICINE

## 2021-11-04 PROCEDURE — 85610 PROTHROMBIN TIME: CPT | Performed by: INTERNAL MEDICINE

## 2021-11-04 PROCEDURE — 97162 PT EVAL MOD COMPLEX 30 MIN: CPT

## 2021-11-04 PROCEDURE — 80053 COMPREHEN METABOLIC PANEL: CPT | Performed by: INTERNAL MEDICINE

## 2021-11-04 PROCEDURE — 82962 GLUCOSE BLOOD TEST: CPT

## 2021-11-04 PROCEDURE — 85384 FIBRINOGEN ACTIVITY: CPT | Performed by: INTERNAL MEDICINE

## 2021-11-04 PROCEDURE — 85025 COMPLETE CBC W/AUTO DIFF WBC: CPT | Performed by: INTERNAL MEDICINE

## 2021-11-04 PROCEDURE — 97530 THERAPEUTIC ACTIVITIES: CPT

## 2021-11-04 PROCEDURE — 99232 SBSQ HOSP IP/OBS MODERATE 35: CPT | Performed by: INTERNAL MEDICINE

## 2021-11-04 PROCEDURE — 84100 ASSAY OF PHOSPHORUS: CPT | Performed by: INTERNAL MEDICINE

## 2021-11-04 PROCEDURE — 25010000002 FONDAPARINUX PER 0.5 MG: Performed by: INTERNAL MEDICINE

## 2021-11-04 PROCEDURE — 99233 SBSQ HOSP IP/OBS HIGH 50: CPT | Performed by: INTERNAL MEDICINE

## 2021-11-04 RX ORDER — LOSARTAN POTASSIUM 50 MG/1
50 TABLET ORAL
Status: DISCONTINUED | OUTPATIENT
Start: 2021-11-04 | End: 2021-11-08 | Stop reason: HOSPADM

## 2021-11-04 RX ORDER — MAGNESIUM SULFATE HEPTAHYDRATE 40 MG/ML
2 INJECTION, SOLUTION INTRAVENOUS ONCE
Status: COMPLETED | OUTPATIENT
Start: 2021-11-04 | End: 2021-11-04

## 2021-11-04 RX ORDER — FONDAPARINUX SODIUM 2.5 MG/.5ML
2.5 INJECTION SUBCUTANEOUS
Status: DISCONTINUED | OUTPATIENT
Start: 2021-11-04 | End: 2021-11-05

## 2021-11-04 RX ORDER — DIGOXIN 125 MCG
125 TABLET ORAL
Status: DISCONTINUED | OUTPATIENT
Start: 2021-11-04 | End: 2021-11-07

## 2021-11-04 RX ORDER — LANSOPRAZOLE
30 KIT EVERY MORNING
Status: DISCONTINUED | OUTPATIENT
Start: 2021-11-05 | End: 2021-11-04

## 2021-11-04 RX ADMIN — METOPROLOL SUCCINATE 75 MG: 25 TABLET, EXTENDED RELEASE ORAL at 08:09

## 2021-11-04 RX ADMIN — LOSARTAN POTASSIUM 50 MG: 50 TABLET, FILM COATED ORAL at 12:11

## 2021-11-04 RX ADMIN — DIGOXIN 125 MCG: 125 TABLET ORAL at 12:11

## 2021-11-04 RX ADMIN — PANTOPRAZOLE SODIUM 40 MG: 40 INJECTION, POWDER, FOR SOLUTION INTRAVENOUS at 08:09

## 2021-11-04 RX ADMIN — SODIUM CHLORIDE, PRESERVATIVE FREE 10 ML: 5 INJECTION INTRAVENOUS at 21:58

## 2021-11-04 RX ADMIN — SODIUM CHLORIDE, PRESERVATIVE FREE 10 ML: 5 INJECTION INTRAVENOUS at 09:44

## 2021-11-04 RX ADMIN — CHLORHEXIDINE GLUCONATE 15 ML: 1.2 RINSE ORAL at 21:58

## 2021-11-04 RX ADMIN — METOPROLOL SUCCINATE 75 MG: 25 TABLET, EXTENDED RELEASE ORAL at 21:58

## 2021-11-04 RX ADMIN — MAGNESIUM SULFATE HEPTAHYDRATE 2 G: 2 INJECTION, SOLUTION INTRAVENOUS at 08:11

## 2021-11-04 RX ADMIN — FONDAPARINUX SODIUM 2.5 MG: 2.5 INJECTION, SOLUTION SUBCUTANEOUS at 12:11

## 2021-11-04 NOTE — PLAN OF CARE
Problem: Adult Inpatient Plan of Care  Goal: Plan of Care Review  Recent Flowsheet Documentation  Taken 11/4/2021 5996 by Emma Hinds PT  Plan of Care Reviewed With: patient  Outcome Summary: Pt is a 55 yo male admitted with progressive fatigue and RUQ pain. Pt found to be in cardiogenic shock and is s/p cardiac intervention. Pt works as a Chiropractor and typically is IND with all mobility however recently becomes SOA and fatigued with minimal activity. Upon PT exam, pt performed bed mobility with supervision, min A for transfers and min A to ambulate about 100'. Pt generally unsteady and has multiple losses of balance while ambulating in hallway - PT provided HHA to assist with balance. Pt may benefit from skilled PT while inpatient to address balance and endurance deficits. DC recs pending progress - include acute rehab vs home with assist and HH.

## 2021-11-04 NOTE — THERAPY EVALUATION
Patient Name: Denis Chavez  : 1965    MRN: [id number]                              Today's Date: 2021       Admit Date: 10/29/2021    Visit Dx:     ICD-10-CM ICD-9-CM   1. Atrial fibrillation with rapid ventricular response (HCC)  I48.91 427.31   2. KRISHNA (acute kidney injury) (HCC)  N17.9 584.9   3. Hyperkalemia  E87.5 276.7   4. Right upper quadrant abdominal pain  R10.11 789.01   5. Elevated LFTs  R79.89 790.6   6. Cardiogenic shock (HCC)  R57.0 785.51     Patient Active Problem List   Diagnosis   • Atrial fibrillation with rapid ventricular response (HCC)   • KRISHNA (acute kidney injury) (HCC)   • Hyperkalemia   • Right upper quadrant abdominal pain   • Elevated LFTs   • Mitral insufficiency, acute   • Cardiogenic shock (HCC)   • Coagulopathy (HCC)   • Thrombocytopenia (HCC)   • Other specified anemias   • Hypofibrinogenemia (HCC)     No past medical history on file.  Past Surgical History:   Procedure Laterality Date   • CARDIAC CATHETERIZATION Right 10/29/2021    Procedure: Left Heart Cath;  Surgeon: Get Cunha MD;  Location: [facility] CATH INVASIVE LOCATION;  Service: Cardiology;  Laterality: Right;   • CARDIAC CATHETERIZATION N/A 10/29/2021    Procedure: Coronary angiography;  Surgeon: Get Cunha MD;  Location: [facility] CATH INVASIVE LOCATION;  Service: Cardiology;  Laterality: N/A;   • CARDIAC ELECTROPHYSIOLOGY PROCEDURE N/A 10/30/2021    Procedure: IMPELLA INSERTION;  Surgeon: Get Cunha MD;  Location: [facility] CATH INVASIVE LOCATION;  Service: Cardiology;  Laterality: N/A;   • CARDIAC ELECTROPHYSIOLOGY PROCEDURE N/A 10/30/2021    Procedure: Cardioversion;  Surgeon: Get Cunha MD;  Location:  [facility] CATH INVASIVE LOCATION;  Service: Cardiology;  Laterality: N/A;   • CARDIAC ELECTROPHYSIOLOGY PROCEDURE N/A 2021    Procedure: Impella Removal;  Surgeon: Get Cunha MD;  Location: [facility] CATH INVASIVE LOCATION;  Service: Cardiology;  Laterality: N/A;      General Information      Row Name 11/04/21 1531          Physical Therapy Time and Intention    Document Type evaluation  -CF     Mode of Treatment individual therapy; physical therapy  -CF     Row Name 11/04/21 1531          General Information    Patient Profile Reviewed yes  -CF     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  -CF     Existing Precautions/Restrictions fall  -CF     Barriers to Rehab medically complex  -CF     Row Name 11/04/21 1531          Living Environment    Lives With alone  -CF     Row Name 11/04/21 1531          Cognition    Orientation Status (Cognition) oriented x 4  -CF     Row Name 11/04/21 1531          Safety Issues, Functional Mobility    Safety Issues Affecting Function (Mobility) insight into deficits/self-awareness  -CF     Impairments Affecting Function (Mobility) endurance/activity tolerance; balance; strength  -CF           User Key  (r) = Recorded By, (t) = Taken By, (c) = Cosigned By    Initials Name Provider Type    CF Emma Hinds, PT Physical Therapist               Mobility     Row Name 11/04/21 1532          Bed Mobility    Bed Mobility supine-sit  -CF     Supine-Sit [place] (Bed Mobility) supervision  -CF     Assistive Device (Bed Mobility) head of bed elevated; bed rails  -CF     Row Name 11/04/21 1532          Transfers    Comment (Transfers) Posterior lean initially with standing, generally unsteady throughout. At first did not want HHA but PT insisted as pt continued to have losses of balance  -CF     Row Name 11/04/21 1532          Sit-Stand Transfer    Sit-Stand [place] (Transfers) contact guard; minimum assist (75% patient effort); verbal cues  -CF     Row Name 11/04/21 1532          Gait/Stairs (Locomotion)    [place] Level (Gait) minimum assist (75% patient effort); verbal cues  -CF     Assistive Device (Gait) other (see comments)  HHA  -CF     Deviations/Abnormal Patterns (Gait) wagner decreased; gait speed decreased  -CF     Bilateral Gait  Deviations forward flexed posture  -CF     Comment (Gait/Stairs) >5 minor losses of balance in hallway  -CF           User Key  (r) = Recorded By, (t) = Taken By, (c) = Cosigned By    Initials Name Provider Type    Emma Smith PT Physical Therapist               Obj/Interventions     Row Name 11/04/21 1533          Range of Motion Comprehensive    General Range of Motion bilateral lower extremity ROM WFL  -CF     Row Name 11/04/21 1533          Strength Comprehensive (MMT)    Comment, General Manual Muscle Testing (MMT) Assessment Generalized weakness  -CF     Row Name 11/04/21 1533          Balance    Balance Assessment sitting static balance; sitting dynamic balance; standing static balance; standing dynamic balance  -CF     Static Sitting Balance WFL; sitting, edge of bed  -CF     Dynamic Sitting Balance WFL; sitting, edge of bed  -CF     Static Standing Balance mild impairment; unsupported  -CF     Dynamic Standing Balance moderate impairment; unsupported  -CF           User Key  (r) = Recorded By, (t) = Taken By, (c) = Cosigned By    Initials Name Provider Type    Emma Smith PT Physical Therapist               Goals/Plan     Row Name 11/04/21 1544          Transfer Goal 1 (PT)    Activity/Assistive Device (Transfer Goal 1, PT) sit-to-stand/stand-to-sit; bed-to-chair/chair-to-bed  -CF     [place] Level/Cues Needed (Transfer Goal 1, PT) modified independence  -CF     Time Frame (Transfer Goal 1, PT) 2 weeks  -CF     [facility] Name 11/04/21 1544          Gait Training Goal 1 (PT)    Activity/Assistive Device (Gait Training Goal 1, PT) gait (walking locomotion)  -CF     [place] Level (Gait Training Goal 1, PT) modified independence  -CF     Distance (Gait Training Goal 1, PT) 300'  -CF     Time Frame (Gait Training Goal 1, PT) 2 weeks  -CF           User Key  (r) = Recorded By, (t) = Taken By, (c) = Cosigned By    Initials Name Provider Type    Emma Smith PT Physical Therapist                Clinical Impression     Row Name 11/04/21 1536          Pain    Additional Documentation Pain Scale: Numbers Pre/Post-Treatment (Group)  -CF     Row Name 11/04/21 1536          Pain Scale: Numbers Pre/Post-Treatment    Pretreatment Pain Rating 0/10 - no pain  -     Row Name 11/04/21 1536          Plan of Care Review    Plan of Care Reviewed With patient  -CF     Outcome Summary Pt is a 57 yo male admitted with progressive fatigue and RUQ pain. Pt found to be in cardiogenic shock and is s/p cardiac intervention. 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 Upon PT exam, pt performed bed mbility with supervision, min A for transfers and min A to ambulate about 100'. Pt generally unsteady and has multiple losses of balance while ambulating in hallway - PT provided HHA to assist with balance. Pt may benefit from skilled PT while inpatient to address balance and endurance deficits. DC recs pending progress - include acute rehab vs home with assist and HH.  -     Row Name 11/04/21 1536          Therapy Assessment/Plan (PT)    Rehab Potential (PT) good, to achieve stated therapy goals  -CF     Criteria for Skilled Interventions Met (PT) yes  -CF     Predicted Duration of Therapy Intervention (PT) 2 weeks  -     Row Name 11/04/21 1536          Vital Signs    Intratreatment Heart Rate (beats/min) 137  -CF     Posttreatment Heart Rate (beats/min) 110  -CF     O2 Delivery Pre Treatment room air  -CF     O2 Delivery Intra Treatment room air  -CF     O2 Delivery Post Treatment room air  -CF     Row Name 11/04/21 1536          Positioning and Restraints    Pre-Treatment Position in bed  -CF     Post Treatment Position chair  -CF     In Chair sitting; call light within reach; encouraged to call for assist; notified nsg; with family/caregiver  -CF           User Key  (r) = Recorded By, (t) = Taken By, (c) = Cosigned By     Initials Name Provider Type    CF Emma Hinds, GAYLE Physical Therapist               Outcome Measures     Row Name 11/04/21 1545          How much help from another person do you currently need...    Turning from your back to your side while in flat bed without using bedrails? 3  -CF     Moving from lying on back to sitting on the side of a flat bed without bedrails? 3  -CF     Moving to and from a bed to a chair (including a wheelchair)? 3  -CF     Standing up from a chair using your arms (e.g., wheelchair, bedside chair)? 3  -CF     Climbing 3-5 steps with a railing? 2  -CF     To walk in hospital room? 3  -CF     AM-PAC 6 Clicks Score (PT) 17  -CF     Row Name 11/04/21 1545          Functional Assessment    Outcome Measure Options AM-PAC 6 Clicks Basic Mobility (PT)  -CF           User Key  (r) = Recorded By, (t) = Taken By, (c) = Cosigned By    Initials Name Provider Type    CF Emma Hinds PT Physical Therapist                             Physical Therapy Education                 Title: PT OT SLP Therapies (In Progress)     Topic: Physical Therapy (In Progress)     Point: Mobility training (Done)     Learning Progress Summary           Patient Acceptance, E, VU,NR by  at 11/4/2021 1545                   Point: Home exercise program (Not Started)     Learner Progress:  Not documented in this visit.          Point: Body mechanics (Done)     Learning Progress Summary           Patient Acceptance, E, VU,NR by CF at 11/4/2021 1545                   Point: Precautions (Done)     Learning Progress Summary           Patient Acceptance, E, VU,NR by CF at 11/4/2021 1545                               User Key     Initials Effective Dates Name Provider Type Discipline    CF 06/16/21 -  Emma Hinds PT Physical Therapist PT              PT Recommendation and Plan  Planned Therapy Interventions (PT): balance training, bed mobility training, gait training, stair training, ROM (range of motion),  strengthening, patient/family education, transfer training  Plan of Care Reviewed With: patient  Outcome Summary: Pt is a 55 yo male admitted with progressive fatigue and RUQ pain. Pt found to be in cardiogenic shock and is s/p cardiac intervention. 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 Upon PT exam, pt performed bed mbility with supervision, min A for transfers and min A to ambulate about 100'. Pt generally unsteady and has multiple losses of balance while ambulating in hallway - PT provided HHA to assist with balance. Pt may benefit from skilled PT while inpatient to address balance and endurance deficits. DC recs pending progress - include acute rehab vs home with assist and HH.     Time Calculation:    PT Charges     Row Name 11/04/21 1547             Time Calculation    Start Time 1407  -CF      Stop Time 1425  -CF      Time Calculation (min) 18 min  -CF      PT Received On 11/04/21  -CF      PT - Next Appointment 11/05/21  -CF      PT Goal Re-Cert Due Date 11/18/21  -CF              Time Calculation- PT    Total Timed Code Minutes- PT 10 minute(s)  -CF              Timed Charges    54277 - PT Therapeutic Activity Minutes 10  -CF              Total Minutes    Timed Charges Total Minutes 10  -CF       Total Minutes 10  -CF            User Key  (r) = Recorded By, (t) = Taken By, (c) = Cosigned By    Initials Name Provider Type    CF Emma Hinds PT Physical Therapist              Therapy Charges for Today     Code Description Service Date Service Provider Modifiers Qty    88996320620  PT EVAL MOD COMPLEXITY 2 11/4/2021 Emma Hinds, PT GP 1    [id number]  PT THERAPEUTIC ACT EA 15 MIN 11/4/2021 Emma Hinds, PT GP 1          PT G-Codes  Outcome Measure Options: AM-PAC 6 Clicks Basic Mobility (PT)  AM-PAC 6 Clicks Score (PT): 17    Emma Hinds PT  11/4/2021

## 2021-11-04 NOTE — PROGRESS NOTES
[facility] GROUP INPATIENT PROGRESS NOTE    Length of Stay:  6 days    CHIEF COMPLAINT: Cardiogenic shock with multiorgan failure, coagulopathy, thrombocytopenia, right upper extremity/IJ DVT      SUBJECTIVE: The patient today is lying in bed, conversant, has no current complaints. He did undergo Dopplers yesterday which showed acute right IJ and subclavian DVT, acute right basilic superficial thrombophlebitis, acute left upper extremity superficial, phlebitis, subacute right upper extremity brachial DVT, acute right lower extremity femoral and calf DVT, acute left femoral DVT with incidental finding of small left femoral pseudoaneurysm. Patient has remained off anticoagulation due to his thrombocytopenia.      ROS:  Review of Systems a comprehensive review of systems was obtained with pertinent positive findings as noted in the interval history above. All other systems negative.    OBJECTIVE:  Vitals:    11/03/21 2010 11/03/21 2345 11/04/21 0352 11/04/21 0700   BP:    (!) 140/107   Pulse: 105   99   Resp:       Temp:  98.4 °F (36.9 °C) 98 °F (36.7 °C)    TempSrc:  Oral Oral    SpO2:    95%   Weight:       Height:             PHYSICAL EXAMINATION:  General: Alert and oriented x3 no distress  Neck: Right IJ central catheter has been removed  Chest/Lungs: Clear to auscultation bilaterally anteriorly  Heart: Irregular, borderline tachycardic, no murmurs gallops or rubs  Abdomen/GI: Soft nontender nondistended bowel sounds present  Extremities: Trace edema in the right upper extremity, no significant edema left upper extremity. 1+ edema bilateral lower extremities    DIAGNOSTIC DATA:  Results Review:     I reviewed the patient's new clinical results.    Results from last 7 days   Lab Units 11/03/21  0932 11/03/21  0316 11/02/21  1806 11/02/21  0403 11/02/21  0403   WBC 10*3/mm3  --  6.19 6.86  --  7.01   HEMOGLOBIN g/dL  --  11.4* 12.3*  --  13.0   HEMATOCRIT %  --  35.8* 37.0*  --  39.9   PLATELETS 10*3/mm3 36*  36* 38*   < > 56*    < > = values in this interval not displayed.      Results from last 7 days   Lab Units 11/03/21  0316 11/03/21  0026 11/02/21  1806 11/02/21  0924 11/02/21  0923 11/02/21  0403 11/01/21  1522 11/01/21  0426   SODIUM mmol/L 143  --   --   --   --  142  --  139   POTASSIUM mmol/L 3.8  --   --  3.5  --  3.6   < > 3.4*   CHLORIDE mmol/L 105  --   --   --   --  102  --  103   CO2 mmol/L 30.4*  --   --   --   --  28.4  --  26.5   BUN mg/dL 21*  --   --   --   --  30*  --  37*   CREATININE mg/dL 1.02 1.06 1.28*  --    < > 1.63*   < > 1.85*  1.85*   CALCIUM mg/dL 8.1*  --   --   --   --  8.0*  --  7.4*   BILIRUBIN mg/dL 3.5*  --   --   --   --  5.4*  --  3.4*   ALK PHOS U/L 65  --   --   --   --  62  --  54   ALT (SGPT) U/L 1,781*  --   --   --   --  2,723*  --  3,451*   AST (SGOT) U/L 446*  --   --   --   --  985*  --  2,182*   GLUCOSE mg/dL 94  --   --   --   --  108*  --  94    < > = values in this interval not displayed.      Lab Results   Component Value Date    NEUTROABS 4.70 11/03/2021     Results from last 7 days   Lab Units 11/03/21  0316 11/02/21  1806 11/02/21  0558   INR  1.27* 1.26* 1.29*   APTT seconds 30.1 30.5 42.2*     Results from last 7 days   Lab Units 11/03/21  0316   MAGNESIUM mg/dL 1.6       Assessment/Plan   ASSESSMENT/PLAN:  This is a 56 y.o. male with:     Cardiogenic shock, atrial fibrillation  · Biventricular failure, etiology unclear  · Patient requiring pressor support  · Patient had transthoracic echocardiogram, and cardiac catheterization on 10/29/2021.  · Status post cardioversion x2, persistent atrial fibrillation  · Impella device placed, initiated heparin  · Patient had POOJA on 10/30/2021 with ejection fraction 26-30%, severe dilation right ventricular cavity, moderate mitral regurgitation, Impella device in left ventricle.  · Investigation regarding potential cardiac transplant per cardiology, does not appear feasible due to lack of social support.  · Patient  currently off of pressors, continuing on milrinone  · Bedside echocardiogram 11/2/2021 with ejection fraction 50%, low normal, normal right ventricular systolic function.  · Impella device removed on 11/2/2021, flat rate heparin discontinued as well  · Per Dr. Ma, patient will require ongoing anticoagulation due to atrial fibrillation.     *Acute hypoxic respiratory failure    · Patient previously required ventilatory support  · Patient extubated on 11/2/2021     *Shock liver  · Severe hepatic dysfunction secondary to cardiogenic shock  · Transaminases trending down with ALT 1352, , total bilirubin 2.9    *KRISHNA  · Secondary to cardiogenic shock  · Nephrology following  · Creatinine has continued to improve, currently down to 0.87     *Coagulopathy with hypofibrinogenemia/DIC  · Likely secondary to shock liver and reduced synthetic function in addition to possible effects from Impella device producing consumption  · Heparin drip initiated with placement of Impella device  · Patient has received FFP, cryoprecipitate, vitamin K  · PT mixing study 10/31/2021 with decreased from 22.3 down to 14.9 (near correction indicating likely a factor deficiency)  · Reluctant to administer further FFP/cryoprecipitate in the setting of active thrombosis (see below).  · Subsequent improvement in coagulation parameters spontaneously  · Labs today with INR 1.18, PTT 27.4, fibrinogen 270. Platelet count remains low however at 46,000 (see below) although it is slightly improved from yesterday.    *Right upper extremity/IJ catheter associated/IJ DVT 10/31/2021 with subsequent acute right upper extremity IJ and subclavian, subacute right brachial DVT, acute bilateral upper extremity superficial thrombophlebitis, acute right femoral/calf DVT, acute left femoral DVT 11/3/2021  · Doppler on 10/31/2021 with acute right upper extremity catheter associated IJ thrombus, acute left upper extremity superficial thrombophlebitis  (cephalic).  · Patient was previously receiving fixed rate heparin with Impella device (heparin initiated 10/30/2021) until Impella device was removed on 11/2/2021  · Orders placed for initiation of full dose heparin on 11/2/2021 however heparin was not initiated due to platelet count of 38,000.  · Dopplers on 11/3/2021 showed acute right upper extremity IJ and subclavian, subacute right brachial DVT, acute bilateral upper extremity superficial thrombophlebitis, acute right femoral/calf DVT, acute left femoral DVT  · Incidental finding on Dopplers of right radial artery occlusion (secondary to radial artery catheterization) and small left femoral artery pseudoaneurysm.  · Platelet count today has improved slightly to 46,000. With improvement in platelet count and severity of the patient's underlying thrombotic issues we will go ahead and begin anticoagulation today. There is concern regarding possible heparin-induced thrombocytopenia given the extent of his thromboses. We will therefore avoid heparin. Argatroban is not a good choice given the patient's hepatic dysfunction. Therefore we will go ahead and begin treatment with reduced dose Arixtra at 2.5 mg subcutaneous injection daily. If platelet count improves we will increase to full dose Arixtra and eventually consider transition to NOAC. Monitor for signs of bleeding with anticoagulation in the setting of thrombocytopenia.    *Thrombocytopenia.   · No prior labs available  · On admission 10/29/2021 platelets 131,000.   · Platelets trended down, 71,000 on 10/30/2021  · Labs on 10/31/2021 with negative heparin antiplatelet antibody with KASEY 0.121 OD.  · CT abdomen and pelvis 10/29/2021 with normal spleen.  · Thrombocytopenia felt to be related to consumption initially due to shock/DIC with exacerbation by Impella device  · Patient received platelet transfusion 2 units around time of placement of Impella device  · On 10/31/2021 platelet count 44,000, received 1  additional unit platelets with increase to 79,000  · Impella device was removed on 11/2/2021  · Platelet count declined further down to 38,000 on 11/2/2021 and 36,000 on 11/3/2021. With resolution of DIC by coag parameters and removal of Impella device, unclear as to the etiology of the worsening thrombocytopenia.   · On 11/3/2021, sent additional evaluation with evaluation today with B12 greater than 2000, folate 17.1, IPF 7.7% (elevated). Additional labs pending with serum protein electrophoresis, immunoelectrophoresis, quantitative immunoglobulins, free serum light chains as well as repeat heparin antiplatelet antibody as well as serotonin release assay.   · Given the presence of multiple areas of thrombosis, suspicion for possible heparin-induced thrombocytopenia  · Today, platelet count has improved slightly to 46,000. As above we will initiate anticoagulation with low-dose Arixtra 2.5 mg subcutaneous injection daily and increased dose in the future as appropriate pending platelet count.     *Erythrocytosis  · Hematocrit on admission was 53.3 10/29/2021  · Hematocrit has gradually declined since admission into the 12-13 range  · Current hematocrit 40.5    *Possible pneumonia/sepsis  · Patient received empiric Zosyn, completed course.     PLAN:  1. Begin Arixtra 2.5 mg subcutaneous injection daily  2. We will need to avoid any heparin products pending the heparin antiplatelet antibody/serotonin release assay results  3. Labs pending from 11/3/2021 with serum protein electrophoresis, immunoelectrophoresis, quantitative immunoglobulins, free serum light chains, repeat heparin antiplatelet antibody and serotonin release assay.  4. Consult vascular surgery regarding right radial artery occlusion and left femoral artery pseudoaneurysm.  5. Daily CBC, CMP, PT, PTT, fibrinogen    Discussed with patient at bedside           Denis Melgoza MD

## 2021-11-04 NOTE — PROGRESS NOTES
[facility] Progress Note      Patient Name: Denis Chavez  : 1965  MRN: [id number]  Primary Care Physician:  Provider, No Known  Date of admission: 10/29/2021    Subjective     Interval History:   Follow up KRISHNA. Slept 4 hours. No labs drawn yet. Complains of boring type chest pain left sternal border.  Not pleuritic.  Eating very well. Not soa lying flat. Almanza not dc yesterday.  UOP 8.5 liters. Some tachycardia with eating yesterday.   Says he lost over 20 pounds unintentionally because he felt so full in his abdomen past 2 months.     Review of Systems:   As noted above    Objective     Vitals:   Temp:  [98 °F (36.7 °C)-98.4 °F (36.9 °C)] 98 °F (36.7 °C)  Heart Rate:  [105] 105    Intake/Output Summary (Last 24 hours) at 2021 0715  Last data filed at 2021 0456  Gross per 24 hour   Intake 750 ml   Output 8500 ml   Net -7750 ml       Physical Exam:    General Appearance: Awake, nasal 02.   Voice stronger. Answers appropriately.    Skin: warm and dry  Neck: no jvd.   HEENT scleral icterus. Oral mucosa moist. Pharynx mild erythema .  Lungs: Clear to auscultation, no wheezing.   Heart: Irreg, irreg . no rub.   Abdomen: soft, nontender, nondistended. + bs.  Body wall edema.   : almanza  Extremities:1+ upper ext edema. R > L. 1+ lower ext edema.   Neuro: appropriate response to questions.  Moves all 4 ext.     Scheduled Meds:     chlorhexidine, 15 mL, Mouth/Throat, Q12H  metoprolol succinate XL, 50 mg, Oral, Q12H  pantoprazole, 40 mg, Intravenous, Daily  sodium chloride, 10 mL, Intravenous, Q12H  torsemide, 40 mg, Oral, Daily      IV Meds:   propofol, 5-50 mcg/kg/min, Last Rate: Stopped (21 1426)        Results Reviewed:   I have personally reviewed the results from the time of this admission to 2021 07:15 EDT     Results from last 7 days   Lab Units 21  0316 21  0026 21  1806 21  0924 21  0923 21  0403 21  1522  11/01/21 0426   SODIUM mmol/L 143  --   --   --   --  142  --  139   POTASSIUM mmol/L 3.8  --   --  3.5  --  3.6   < > 3.4*   CHLORIDE mmol/L 105  --   --   --   --  102  --  103   CO2 mmol/L 30.4*  --   --   --   --  28.4  --  26.5   BUN mg/dL 21*  --   --   --   --  30*  --  37*   CREATININE mg/dL 1.02 1.06 1.28*  --    < > 1.63*   < > 1.85*  1.85*   CALCIUM mg/dL 8.1*  --   --   --   --  8.0*  --  7.4*   BILIRUBIN mg/dL 3.5*  --   --   --   --  5.4*  --  3.4*   ALK PHOS U/L 65  --   --   --   --  62  --  54   ALT (SGPT) U/L 1,781*  --   --   --   --  2,723*  --  3,451*   AST (SGOT) U/L 446*  --   --   --   --  985*  --  2,182*   GLUCOSE mg/dL 94  --   --   --   --  108*  --  94    < > = values in this interval not displayed.       Estimated Creatinine Clearance: 96.7 mL/min (by C-G formula based on SCr of 1.02 mg/dL).    Results from last 7 days   Lab Units 11/03/21  1951 11/03/21  0316 10/31/21  0250 10/30/21  2227   MAGNESIUM mg/dL  --  1.6 2.1 2.2   PHOSPHORUS mg/dL 2.7 2.3*  --   --        Results from last 7 days   Lab Units 10/30/21  0356   URIC ACID mg/dL 16.8*       Results from last 7 days   Lab Units 11/03/21  0932 11/03/21 0316 11/02/21  1806 11/02/21  0403 11/01/21  1522 11/01/21  0426 11/01/21  0426   WBC 10*3/mm3  --  6.19 6.86 7.01 8.97  --  7.52   HEMOGLOBIN g/dL  --  11.4* 12.3* 13.0 13.9  --  12.2*   PLATELETS 10*3/mm3 36* 36* 38* 56* 66*   < > 64*    < > = values in this interval not displayed.       Results from last 7 days   Lab Units 11/03/21  0316 11/02/21  1806 11/02/21  0558 11/01/21  1522 11/01/21  0426   INR  1.27* 1.26* 1.29* 1.32* 1.65*       Assessment / Plan     ASSESSMENT:  1. KRISHNA.  Excellent response to oral torsemide.  Will hold diuretic this am until I see labs.  Volume by exam better. Suspect some of the edema is due to bilateral lower ext DVT.    2. Nonischemic cardiogenic shock . Off Impella. Biventricular failure. LV and RV improved on repeat echo 11/2.  Acute respiratory  failure resolved off vent.    3. Shock liver.  Slowly improving.   4. TCP, coagulopathy with correction on mixing study. Dr. Melgoza evaluating.  Hypercoag state with multiple DVT  RIJ, RUE, bilateral lower ext DVT   5. Atrial fibrillation. SP cardioversion x 2 without success.  Digoxin loaded 11/1.  Oral metoprolol today per cardiology.  Rate overall improved.     PLAN:  1. RN instructed to have lab do labs now.  2. OUMOU almanza later today.  3. Hold diuretic today .      An Kirkpatrick MD  11/04/21  07:15 EDT    [facility] \[facility]\""  [phone]

## 2021-11-04 NOTE — PROGRESS NOTES
"      [facility]         Dr Hicks Sayied   LOS: 6 days   Patient Care Team:  Provider, No Known as PCP - General    Chief Complaint: Acute liver failure suspected salicylate toxicity with cardiogenic shock coagulopathy lactic acidosis thrombocytopenia multiple issues ongoing    Interval History: Doing well currently on nasal cannula oxygen.  No overnight issues reported.    REVIEW OF SYSTEMS:   No chest pain no shortness of breath at this time    Ventilator/Non-Invasive Ventilation Settings (From admission, onward)           Cannula oxygen    Vital Signs  Temp:  [98 °F (36.7 °C)-98.9 °F (37.2 °C)] 98.9 °F (37.2 °C)  Heart Rate:  [] 96  BP: (130-157)/() 154/103    Intake/Output Summary (Last 24 hours) at 11/4/2021 1245  Last data filed at 11/4/2021 1100  Gross per 24 hour   Intake 750 ml   Output 6250 ml   Net -5500 ml     Flowsheet Rows      First Filed Value   Admission Height 175.3 cm (69\") Documented at 10/29/2021 1717   Admission Weight 99.8 kg (220 lb) Documented at 10/29/2021 1433          Physical Exam:  Patient is examined using the personal protective equipment as per guidelines from infection control for this particular patient as enacted.  Hand hygiene was performed before and after patient interaction.   General Appearance:   Awake no distress following simple commands  Neck midline trachea, no thyromegaly   Lungs:    Diminished breath sounds on the bases bilaterally    Heart:    Regular rhythm and normal rate, normal S1 and S2, no            murmur, no gallop, no rub, no click   Chest Wall:    No abnormalities observed   Abdomen:    Soft no masses felt   Extremities:  1+ edema, no cyanosis, no             redness.  Perfusion of lower extremity has improved  CNS no focal deficit simple commands  Skin no rashes no nodules  Musculoskeletal no cyanosis no clubbing normal range of motion     Results Review:        Results from last 7 days   Lab Units 11/04/21  0756 11/03/21  4176 " 11/03/21 0316 11/03/21  0026 11/02/21  1806 11/02/21  0924 11/02/21  0923 11/02/21  0403 11/02/21  0403   SODIUM mmol/L 138  --  143  --   --   --   --   --  142   POTASSIUM mmol/L 3.6  --  3.8  --   --  3.5  --   --  3.6   CHLORIDE mmol/L 98  --  105  --   --   --   --   --  102   CO2 mmol/L 30.7*  --  30.4*  --   --   --   --   --  28.4   BUN mg/dL 19  --  21*  --   --   --   --   --  30*   CREATININE mg/dL 0.87  --  1.02 1.06   < >  --    < >  --  1.63*   GLUCOSE mg/dL 97   < > 94  --   --   --   --    < > 108*   CALCIUM mg/dL 8.5*  --  8.1*  --   --   --   --   --  8.0*    < > = values in this interval not displayed.     Results from last 7 days   Lab Units 10/31/21  1527 10/31/21  0825 10/30/21  2227 10/30/21  1719 10/29/21  1247   CK TOTAL U/L 339* 347* 351*   < >  --    TROPONIN T ng/mL  --   --   --   --  0.038*    < > = values in this interval not displayed.     Results from last 7 days   Lab Units 11/04/21 0758 11/03/21  0932 11/03/21 0316 11/02/21 1806 11/02/21 1806   WBC 10*3/mm3 7.03  --  6.19  --  6.86   HEMOGLOBIN g/dL 13.2  --  11.4*  --  12.3*   HEMATOCRIT % 40.5  --  35.8*  --  37.0*   PLATELETS 10*3/mm3 46* 36* 36*   < > 38*    < > = values in this interval not displayed.     Results from last 7 days   Lab Units 11/04/21 0758 11/03/21 0316 11/02/21 1806   INR  1.18* 1.27* 1.26*   APTT seconds 27.4 30.1 30.5         Results from last 7 days   Lab Units 11/04/21  0758   MAGNESIUM mg/dL 1.8     Results from last 7 days   Lab Units 11/02/21  0403   TRIGLYCERIDES mg/dL 102     Results from last 7 days   Lab Units 11/02/21  0843   PH, ARTERIAL pH units 7.448   PO2 ART mm Hg 95.1   PCO2, ARTERIAL mm Hg 46.1*   HCO3 ART mmol/L 31.9*       I reviewed the patient's new clinical results.  I personally viewed and interpreted the patient's chest x-ray.        Medication Review:   chlorhexidine, 15 mL, Mouth/Throat, Q12H  digoxin, 125 mcg, Oral, Daily  fondaparinux, 2.5 mg, Subcutaneous, Q24H  losartan,  50 mg, Oral, Q24H  metoprolol succinate XL, 75 mg, Oral, Q12H  pantoprazole, 40 mg, Intravenous, Daily  sodium chloride, 10 mL, Intravenous, Q12H        propofol, 5-50 mcg/kg/min, Last Rate: Stopped (11/02/21 1426)        ASSESSMENT:   1. Acute liver failure: Suspect salicylate toxicity per history, subacute/acute on chronic.  2. Acute cardiogenic shock  3. Acute respiratory failure on vent  4. A. fib with RVR  5. KRISHNA: Mostly volume depletion due to liver failure  6. Electrolytes disturbance: Acute life-threatening hyperkalemia.  Hyponatremia.  7. Coagulopathy, secondary to acute liver disease  8. Lactic acidosis, secondary to liver failure  9. Slightly evaded troponin: Likely secondary to KRISHNA  10. Erythrocytosis, likely more hemoconcentration  11. Bilateral pleural effusion, mild  12. Thrombocytopenia  13. Subcentimetric bilateral calcified nodules  14. Altered mental status  15. Catheter associated right IJ DVT      PLAN:  Respiratory status stable and improving.  Currently on nasal cannula oxygen.  Will wean oxygen to keep sats above 90%.  Cardiogenic shock much improved.  Off Impella and inotropic's.  Cardiac MRI per cardiology  Improving kidney function and hyperkalemia now resolved.  Appreciate input from nephrology  Coagulopathy with thrombocytopenia.  Platelet counts gradually decreasing.  Hematology currently following  Electrolyte management per nephrology.  Creatinine stable and improving  I think this was all cardiogenic shock with CHF.  Attics have been discontinued  Diet per speech.  Stable to transfer out of the ICU today  We will consult hospitalist on the floor        Kurt Evangelista MD  11/04/21  12:45 EDT

## 2021-11-04 NOTE — PROGRESS NOTES
Continued Stay Note  [facility]     Patient Name: Denis Chavez  MRN: [id number]  Today's Date: 11/4/2021    Admit Date: 10/29/2021     Discharge Plan     Row Name 11/04/21 1157       Plan    Plan Plan is home.  Follow up for HH vs cardiac rehab    Plan Comments CCP spoke to patient at bedside  He states he would like to go home at DC  He is aware that he will have therapy evaluations.  He is agreeable to cardiac rehab if he is a candidate  May need Home Health               Discharge Codes    No documentation.                     Sabrina Ortiz RN

## 2021-11-04 NOTE — PLAN OF CARE
Goal Outcome Evaluation:  Plan of Care Reviewed With: patient        Progress: improving  Outcome Summary: pt states he is feeling much better,pt remains on room air, pt hr was elevated at 110's but no where near what it had been earlier yesterday, pt remains in afib, pt complains of no pain at this time,pt rested most of shift, pt hopes to move to regular room later today, will continue to monitor

## 2021-11-04 NOTE — PLAN OF CARE
Goal Outcome Evaluation:           Progress: improving       Patient continues to improve and will be downgraded from ICU today per rounds reports.  Patient to go to MRI at some point today.  Family at bedside with patient at this time.

## 2021-11-04 NOTE — CONSULTS
Patient Name: Denis Chavez Account #: [id number]    MRN: [id number] Admission Date: 10/29/2021      Consulting Service: Vascular Surgery Date of Evaluation: November 4, 2021    Requesting Provider:  Denis Melgoza MD    CHIEF COMPLAINT: Radial artery occlusion and right femoral pseudoaneurysm  HPI: Denis Chavez is a 56 y.o. male whose past medical record I have reviewed and summarize below in addition to the findings from today's exam.   The patient is being seen for a consultation and evaluation/management of occlusion of the right radial artery after cardiac intervention and small right SFA pseudoaneurysm after Impella insertion.  The patient presented to the emergency room with severe nonischemic cardiomyopathy and had cardiac arrest.  He was urgently taken to the Cath Lab where he underwent cardiac catheterization and then Impella insertion.  Catheterization was done from the right radial artery approach and the Impella insertion was from the right femoral approach.  An antegrade stick was performed in the right superficial femoral artery for continued perfusion of that limb.  These were done on October 30, 2021.  The Impella device was removed on November 2, 2021.  He was noted on venous scans to have incidental findings of a right radial artery occlusion but also deep venous thrombosis of the right internal jugular, subclavian and brachial veins.  Also acute superficial thrombophlebitis and cephalic and basilic veins.  In addition, he was noted to have a small pseudoaneurysm of the left superficial femoral artery measuring 1.15 cm.  This was in addition to the acute DVT involving the common femoral, profunda femoral, and calf veins.  The left profunda femoral vein had thrombosis as well.    He has had shock liver, DIC,  as well as thrombocytopenia.  His platelet count was 36,000 yesterday 46,000 today.  His anticoagulation has been held because of this.  Currently he is on low-dose Arixtra.    He is having no symptoms  of hand ischemia on the right.  There is swelling related to the DVT.  No right leg  ischemic changes or symptoms.    PAST MEDICAL HISTORY: No past medical history on file.   PAST SURGICAL HISTORY:   Past Surgical History:   Procedure Laterality Date   • CARDIAC CATHETERIZATION Right 10/29/2021    Procedure: Left Heart Cath;  Surgeon: Get Cunha MD;  Location:  [facility];  Service: Cardiology;  Laterality: Right;   • CARDIAC CATHETERIZATION N/A 10/29/2021    Procedure: Coronary angiography;  Surgeon: Get Cunha MD;  Location:  [facility];  Service: Cardiology;  Laterality: N/A;   • CARDIAC ELECTROPHYSIOLOGY PROCEDURE N/A 10/30/2021    Procedure: IMPELLA INSERTION;  Surgeon: Get Cunha MD;  Location:  [facility];  Service: Cardiology;  Laterality: N/A;   • CARDIAC ELECTROPHYSIOLOGY PROCEDURE N/A 10/30/2021    Procedure: Cardioversion;  Surgeon: Get Cunha MD;  Location:  [facility];  Service: Cardiology;  Laterality: N/A;   • CARDIAC ELECTROPHYSIOLOGY PROCEDURE N/A 11/2/2021    Procedure: Impella Removal;  Surgeon: Get Cunha MD;  Location:  [facility];  Service: Cardiology;  Laterality: N/A;      FAMILY HISTORY: No family history on file.   SOCIAL HISTORY:   Social History     Tobacco Use   • Smoking status: Not on file   • Smokeless tobacco: Not on file   Substance Use Topics   • Alcohol use: Not on file   • Drug use: Not on file      MEDICATIONS:   No current facility-administered medications on file prior to encounter.     No current outpatient medications on file prior to encounter.             ALLERGIES: Patient has no known allergies.   COMPLETE REVIEW OF SYSTEMS:     Review of Systems: 10 system review of system has been performed.  Pertinent positives are as described above.  Remaining systems have been reviewed and are negative.      Vital Signs  Temp:  [98 °F (36.7 °C)-98.9 °F (37.2 °C)]  98.7 °F (37.1 °C)  Heart Rate:  [] 101  BP: (100-163)/() 100/80    Physical Exam: Well-developed well-nourished gentleman in no acute distress awake, alert, and oriented x3.  Currently sitting up in a chair extubated.  HEENT: Normocephalic and atraumatic, extraocular movements intact.  Mildly icteric sclera.  Neck: Supple, full range of motion, no JVD  Heart: Primarily Normal sinus rhythm with occasional ectopic beat.  Chest: Equal bilateral expansion and symmetrical.  Unlabored breathing.  Essentially clear.  Abdomen: Soft and benign no masses palpated.  Neuro: Grossly intact without focal deficit.  Extremities 2+ palpable brachial, ulnar and radial pulses on the left with 2+ palpable brachial, and ulnar pulses on the right.  All digits warm and well-perfused with good capillary refill.  1+ edema of the right arm from the shoulder distally to near the wrist.  2+ palpable femoral pulses noted bilaterally with 1+ palpable pedal pulses noted bilaterally.  Good capillary refill in all digits.  No thrill noted in the right groin.  Trace to 1+ edema noted in the right leg.    LABS:      Results Review:       I reviewed the patient's new clinical results.  Results from last 7 days   Lab Units 11/04/21  0758 11/03/21  0932 11/03/21  0316 11/02/21  1806 11/02/21  0403 11/01/21  1522 11/01/21  0426 10/31/21  1359 10/31/21  1359   WBC 10*3/mm3 7.03  --  6.19 6.86 7.01 8.97 7.52  --  9.55   HEMOGLOBIN g/dL 13.2  --  11.4* 12.3* 13.0 13.9 12.2*  --  12.7*   PLATELETS 10*3/mm3 46* 36* 36* 38* 56* 66* 64*   < > 79*    < > = values in this interval not displayed.     Results from last 7 days   Lab Units 11/04/21  0758 11/03/21  0316 11/03/21  0026 11/02/21  1806 11/02/21  0924 11/02/21  0923 11/02/21  0403 11/02/21  0009 11/01/21  2135 11/01/21  1522 11/01/21  0426 10/31/21  1527 10/31/21  1201 10/31/21  0825 10/31/21  0250 10/30/21  2227 10/30/21  1842 10/30/21  1719 10/30/21  0356   SODIUM mmol/L 138 143  --   --    --   --  142  --   --   --  139  --   --   --  138  --  136  --  130*   POTASSIUM mmol/L 3.6 3.8  --   --  3.5  --  3.6  --  3.6  --  3.4*  --  3.6  --  3.0*   < > 3.8  --  5.4*   CHLORIDE mmol/L 98 105  --   --   --   --  102  --   --   --  103  --   --   --  100  --  101  --  98   CO2 mmol/L 30.7* 30.4*  --   --   --   --  28.4  --   --   --  26.5  --   --   --  24.6  --  19.2*  --  14.9*   BUN mg/dL 19 21*  --   --   --   --  30*  --   --   --  37*  --   --   --  40*  --  43*  --  46*   CREATININE mg/dL 0.87 1.02 1.06 1.28*  --  1.50* 1.63* 1.66*  --    < > 1.85*  1.85*   < >  --    < > 2.15*   < > 2.45*   < > 2.32*   GLUCOSE mg/dL 97 94  --   --   --   --  108*  --   --   --  94  --   --   --  102*  --  143*  --  149*    < > = values in this interval not displayed.   Estimated Creatinine Clearance: 113.4 mL/min (by C-G formula based on SCr of 0.87 mg/dL).  Results from last 7 days   Lab Units 11/04/21  0758 11/03/21 1951 11/03/21 0316 11/02/21  0403 11/01/21  0426 10/31/21  0250 10/30/21  2227 10/30/21  1842 10/30/21  0356 10/29/21  1940 10/29/21  1940 10/29/21  1449 10/29/21  1247   CALCIUM mg/dL 8.5*  --  8.1* 8.0* 7.4* 7.2*  --  7.1* 7.4*   < > 7.4*   < > 9.5   ALBUMIN g/dL 3.30*  --  2.90* 3.20* 2.60* 2.70*  --  2.80* 3.30*   < > 3.60   < > 4.40   MAGNESIUM mg/dL 1.8  --  1.6  --   --  2.1 2.2  --   --   --  2.3  --  2.5   PHOSPHORUS mg/dL 2.7 2.7 2.3*  --   --   --   --   --   --   --   --   --   --     < > = values in this interval not displayed.     Results from last 7 days   Lab Units 11/04/21  0758 11/03/21  0316 11/02/21  1806 11/02/21  0558 11/01/21  1522 11/01/21  0426 10/31/21  1714 10/31/21  1359 10/31/21  1359   PROTIME Seconds 14.8* 15.7* 15.6* 15.9* 16.1* 19.3* 22.3*   < > 22.8*   INR  1.18* 1.27* 1.26* 1.29* 1.32* 1.65*  --   --  2.04*    < > = values in this interval not displayed.       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        Problem Points:  3:  Patient has a new problem, with no additional work-up planned (max of 1)  Total problem points:3    Data Points:  2:  I have personally and independently review of image, tracing, or specimen  2:  I have reviewed and summation of old records and/or discussed the patients care with another health care provider  Total data points:4 or more    Risk Points:  High:  Any illness that poses threat to life or body funciton    MDM Prob point Data point Risk   SF 1 1 Minimal   Low 2 2 Low   Mod 3 3 Moderate   High 4 4 High     Code MDM History Exam Time   77682 SF/Low Detailed Detailed 30   63796 Mod Comprehensive Comprehensive 50   18133 High Comprehensive Comprehensive 70     Detailed history:  4 elements HPI or status of 3 chronic problems; 2-9 system ROS  Comprehensive:  4 elements HPI or status of 3 chronic problems;  10 system ROS    Detailed Exam:  12 findings from any organ system  Comprehensive Exam:  2 findings from each of 9 systems.     [place]       ASSESSMENT/PLAN: 56 y.o. male with right radial artery occlusion and small right superficial femoral artery pseudoaneurysm after life-saving cardiac catheterization  and Impella insertion with removal.  He has deep venous thrombosis of the right upper extremity and both lower extremities.  He cannot be anticoagulated because of his severe thrombocytopenia.  He is now 5 days out from the time of his right radial artery catheterization and occlusion and is totally asymptomatic.   If open repair was considered he would need to be anticoagulated with high-dose heparin and anticoagulated following this.  This would carry considerable risk because of his significant thrombocytopenia.  Therefore, observation only as needed since he is totally asymptomatic.  This is not a lot different than having a harvested radial artery for coronary artery bypass grafting.  In regards to his very small right femoral pseudoaneurysm, a significant majority of pseudoaneurysm is less than 2 cm in size close spontaneously, even when patients are anticoagulated and or on antiplatelet therapy.  Therefore, observation only for this.  We will recheck a femoral artery scan and another 48 hours to check the status of this.  He and his son are aware that if there is increase in size of this then repair may be warranted.  Percutaneous thrombin injection could be a possibility under that circumstance.  All questions answered.  They are in agreement with the plan.    Alan Prince Jr., MD   11/04/21

## 2021-11-04 NOTE — PROGRESS NOTES
LOS: 6 days   Patient Care Team:  Provider, No Known as PCP - General    Chief Complaint:     Status post cardiogenic shock.    Interval History:     He has some left anterior lower chest pain described as a pressure.  He has no difficulty breathing, no orthopnea, no PND, no abdominal pain, no nausea, vomiting or diarrhea.  He did eat yesterday and feels overall pretty good.    Objective   Vital Signs  Temp:  [98 °F (36.7 °C)-98.9 °F (37.2 °C)] 98.9 °F (37.2 °C)  Heart Rate:  [] 99  BP: (140)/(107) 140/107    Intake/Output Summary (Last 24 hours) at 11/4/2021 0842  Last data filed at 11/4/2021 0456  Gross per 24 hour   Intake 750 ml   Output 8500 ml   Net -7750 ml       Comfortable NAD  Neck supple, no JVD or thyromegaly appreciated  S1/S2 RRR, no m/r/g  Lungs CTA B, normal effort  Abdomen S/NT/ND (+) BS, no HSM appreciated  Extremities warm, no clubbing, cyanosis, or edema  No visible or palpable skin lesions  A/Ox4, mood and affect appropriate    Results Review:      Results from last 7 days   Lab Units 11/03/21  0316 11/03/21  0026 11/02/21  1806 11/02/21  0924 11/02/21  0923 11/02/21  0403 11/02/21  0403 11/01/21  1522 11/01/21  0426   SODIUM mmol/L 143  --   --   --   --   --  142  --  139   POTASSIUM mmol/L 3.8  --   --  3.5  --   --  3.6   < > 3.4*   CHLORIDE mmol/L 105  --   --   --   --   --  102  --  103   CO2 mmol/L 30.4*  --   --   --   --   --  28.4  --  26.5   BUN mg/dL 21*  --   --   --   --   --  30*  --  37*   CREATININE mg/dL 1.02 1.06 1.28*  --    < >  --  1.63*   < > 1.85*  1.85*   GLUCOSE mg/dL 94  --   --   --   --    < > 108*  --  94   CALCIUM mg/dL 8.1*  --   --   --   --   --  8.0*  --  7.4*    < > = values in this interval not displayed.     Results from last 7 days   Lab Units 10/31/21  1527 10/31/21  0825 10/30/21  2227 10/30/21  1719 10/29/21  1247   CK TOTAL U/L 339* 347* 351*   < >  --    TROPONIN T ng/mL  --   --   --   --  0.038*    < > = values in this interval not  displayed.     Results from last 7 days   Lab Units 11/03/21  0932 11/03/21  0316 11/02/21  1806 11/02/21  0403 11/02/21  0403   WBC 10*3/mm3  --  6.19 6.86  --  7.01   HEMOGLOBIN g/dL  --  11.4* 12.3*  --  13.0   HEMATOCRIT %  --  35.8* 37.0*  --  39.9   PLATELETS 10*3/mm3 36* 36* 38*   < > 56*    < > = values in this interval not displayed.     Results from last 7 days   Lab Units 11/03/21  0316 11/02/21  1806 11/02/21  0558   INR  1.27* 1.26* 1.29*   APTT seconds 30.1 30.5 42.2*         Results from last 7 days   Lab Units 11/03/21  0316   MAGNESIUM mg/dL 1.6     Results from last 7 days   Lab Units 11/02/21  0403   TRIGLYCERIDES mg/dL 102       I reviewed the patient's new clinical results.  I personally viewed and interpreted the patient's EKG/Telemetry data        Medication Review:   chlorhexidine, 15 mL, Mouth/Throat, Q12H  losartan, 50 mg, Oral, Q24H  magnesium sulfate, 2 g, Intravenous, Once  metoprolol succinate XL, 75 mg, Oral, Q12H  pantoprazole, 40 mg, Intravenous, Daily  sodium chloride, 10 mL, Intravenous, Q12H        propofol, 5-50 mcg/kg/min, Last Rate: Stopped (11/02/21 1426)        Assessment/Plan       Atrial fibrillation with rapid ventricular response (HCC)    KRISHNA (acute kidney injury) (HCC)    Hyperkalemia    Right upper quadrant abdominal pain    Elevated LFTs    Mitral insufficiency, acute    Cardiogenic shock (HCC)    Coagulopathy (HCC)    Thrombocytopenia (HCC)    Other specified anemias    Hypofibrinogenemia (HCC)    1. cardiogenic shock with severe biventricular failure -likely tachycardia mediated.  Impella DC'd on 11/2/2021. LV and RV have improved.  2. Severe mitral insufficiency - functional.  Resolved as cardiac function has improved  3. Transaminitis, shock liver due to poor cardiac output - improving  4. acute renal failure -resolved, nephrology following.  5. Atrial fibrillation with rapid ventricular response on arrival-  has been cardioverted twice but went back into atrial  fibrillation  - rate controlled.   6. Acute respiratory failure - now off vent, extubated 11/2/21.    7. Coagulopathy-resolved   8. thrombocytopenia.-Worsening, hematology following, is not on heparin now as was stopped prior to pulling impella. Will not restart.   9. Probable untreated KELSY.   10. Acute left upper extremity/IJ DVT due to catheter.        Await labs, blood pressure is increasing, add losartan and increase metoprolol.  Cardiac MRI today.  We will also add digoxin for heart rate control.  Awaiting labs.    Padmini Ma MD  11/04/21  08:42 EDT

## 2021-11-05 ENCOUNTER — APPOINTMENT (OUTPATIENT)
Dept: MRI IMAGING | Facility: HOSPITAL | Age: 56
End: 2021-11-05

## 2021-11-05 PROBLEM — K72.00 SHOCK LIVER: Status: ACTIVE | Noted: 2021-10-29

## 2021-11-05 PROBLEM — T81.718A PSEUDOANEURYSM OF FEMORAL ARTERY FOLLOWING PROCEDURE (HCC): Status: ACTIVE | Noted: 2021-11-05

## 2021-11-05 PROBLEM — I82.4Y3 DVT, LOWER EXTREMITY, PROXIMAL, ACUTE, BILATERAL (HCC): Status: ACTIVE | Noted: 2021-11-05

## 2021-11-05 PROBLEM — I82.621 ACUTE DEEP VEIN THROMBOSIS (DVT) OF RIGHT UPPER EXTREMITY (HCC): Status: ACTIVE | Noted: 2021-11-05

## 2021-11-05 PROBLEM — N17.9 AKI (ACUTE KIDNEY INJURY) (HCC): Status: RESOLVED | Noted: 2021-10-29 | Resolved: 2021-11-05

## 2021-11-05 PROBLEM — I72.4 PSEUDOANEURYSM OF FEMORAL ARTERY FOLLOWING PROCEDURE (HCC): Status: ACTIVE | Noted: 2021-11-05

## 2021-11-05 PROBLEM — E87.5 HYPERKALEMIA: Status: RESOLVED | Noted: 2021-10-29 | Resolved: 2021-11-05

## 2021-11-05 LAB
ALBUMIN SERPL-MCNC: 3.4 G/DL (ref 3.5–5.2)
ALBUMIN/GLOB SERPL: 1.4 G/DL
ALP SERPL-CCNC: 113 U/L (ref 39–117)
ALT SERPL W P-5'-P-CCNC: 925 U/L (ref 1–41)
ANION GAP SERPL CALCULATED.3IONS-SCNC: 7.8 MMOL/L (ref 5–15)
APTT PPP: 30.8 SECONDS (ref 22.7–35.4)
AST SERPL-CCNC: 126 U/L (ref 1–40)
BASOPHILS # BLD AUTO: 0.03 10*3/MM3 (ref 0–0.2)
BASOPHILS NFR BLD AUTO: 0.4 % (ref 0–1.5)
BILIRUB SERPL-MCNC: 2.2 MG/DL (ref 0–1.2)
BUN SERPL-MCNC: 19 MG/DL (ref 6–20)
BUN/CREAT SERPL: 16.7 (ref 7–25)
CALCIUM SPEC-SCNC: 8.6 MG/DL (ref 8.6–10.5)
CHLORIDE SERPL-SCNC: 100 MMOL/L (ref 98–107)
CO2 SERPL-SCNC: 29.2 MMOL/L (ref 22–29)
CREAT SERPL-MCNC: 1.14 MG/DL (ref 0.76–1.27)
DEPRECATED RDW RBC AUTO: 46.7 FL (ref 37–54)
EOSINOPHIL # BLD AUTO: 0.11 10*3/MM3 (ref 0–0.4)
EOSINOPHIL NFR BLD AUTO: 1.6 % (ref 0.3–6.2)
ERYTHROCYTE [DISTWIDTH] IN BLOOD BY AUTOMATED COUNT: 14.2 % (ref 12.3–15.4)
FIBRINOGEN PPP-MCNC: 298 MG/DL (ref 219–464)
GFR SERPL CREATININE-BSD FRML MDRD: 66 ML/MIN/1.73
GLOBULIN UR ELPH-MCNC: 2.4 GM/DL
GLUCOSE SERPL-MCNC: 136 MG/DL (ref 65–99)
HCT VFR BLD AUTO: 40.8 % (ref 37.5–51)
HGB BLD-MCNC: 14 G/DL (ref 13–17.7)
INR PPP: 1.15 (ref 0.9–1.1)
LYMPHOCYTES # BLD AUTO: 0.74 10*3/MM3 (ref 0.7–3.1)
LYMPHOCYTES NFR BLD AUTO: 10.7 % (ref 19.6–45.3)
MAGNESIUM SERPL-MCNC: 1.9 MG/DL (ref 1.6–2.6)
MCH RBC QN AUTO: 31.7 PG (ref 26.6–33)
MCHC RBC AUTO-ENTMCNC: 34.3 G/DL (ref 31.5–35.7)
MCV RBC AUTO: 92.3 FL (ref 79–97)
MONOCYTES # BLD AUTO: 0.79 10*3/MM3 (ref 0.1–0.9)
MONOCYTES NFR BLD AUTO: 11.4 % (ref 5–12)
NEUTROPHILS NFR BLD AUTO: 5.2 10*3/MM3 (ref 1.7–7)
NEUTROPHILS NFR BLD AUTO: 75.2 % (ref 42.7–76)
PF4 HEPARIN CMPLX IGG SERPL IA: 0.13 OD (ref 0–0.4)
PHOSPHATE SERPL-MCNC: 2.4 MG/DL (ref 2.5–4.5)
PLATELET # BLD AUTO: 81 10*3/MM3 (ref 140–450)
PMV BLD AUTO: 11.4 FL (ref 6–12)
POTASSIUM SERPL-SCNC: 3.4 MMOL/L (ref 3.5–5.2)
PROT SERPL-MCNC: 5.8 G/DL (ref 6–8.5)
PROTHROMBIN TIME: 14.5 SECONDS (ref 11.7–14.2)
RBC # BLD AUTO: 4.42 10*6/MM3 (ref 4.14–5.8)
SODIUM SERPL-SCNC: 137 MMOL/L (ref 136–145)
SRA .2 IU/ML UFH SER-ACNC: <1 % (ref 0–20)
SRA 100IU/ML UFH SER-ACNC: <1 % (ref 0–20)
SRA UFH SER-IMP: NORMAL
WBC # BLD AUTO: 6.92 10*3/MM3 (ref 3.4–10.8)

## 2021-11-05 PROCEDURE — 85384 FIBRINOGEN ACTIVITY: CPT | Performed by: INTERNAL MEDICINE

## 2021-11-05 PROCEDURE — 25010000002 FONDAPARINUX PER 0.5 MG: Performed by: INTERNAL MEDICINE

## 2021-11-05 PROCEDURE — 85730 THROMBOPLASTIN TIME PARTIAL: CPT | Performed by: INTERNAL MEDICINE

## 2021-11-05 PROCEDURE — 85610 PROTHROMBIN TIME: CPT | Performed by: INTERNAL MEDICINE

## 2021-11-05 PROCEDURE — 75561 CARDIAC MRI FOR MORPH W/DYE: CPT | Performed by: INTERNAL MEDICINE

## 2021-11-05 PROCEDURE — 83735 ASSAY OF MAGNESIUM: CPT | Performed by: INTERNAL MEDICINE

## 2021-11-05 PROCEDURE — A9577 INJ MULTIHANCE: HCPCS | Performed by: INTERNAL MEDICINE

## 2021-11-05 PROCEDURE — 99232 SBSQ HOSP IP/OBS MODERATE 35: CPT | Performed by: INTERNAL MEDICINE

## 2021-11-05 PROCEDURE — 75561 CARDIAC MRI FOR MORPH W/DYE: CPT

## 2021-11-05 PROCEDURE — 85025 COMPLETE CBC W/AUTO DIFF WBC: CPT | Performed by: INTERNAL MEDICINE

## 2021-11-05 PROCEDURE — 80053 COMPREHEN METABOLIC PANEL: CPT | Performed by: INTERNAL MEDICINE

## 2021-11-05 PROCEDURE — 99233 SBSQ HOSP IP/OBS HIGH 50: CPT | Performed by: INTERNAL MEDICINE

## 2021-11-05 PROCEDURE — 97110 THERAPEUTIC EXERCISES: CPT

## 2021-11-05 PROCEDURE — 0 GADOBENATE DIMEGLUMINE 529 MG/ML SOLUTION: Performed by: INTERNAL MEDICINE

## 2021-11-05 PROCEDURE — 84100 ASSAY OF PHOSPHORUS: CPT | Performed by: INTERNAL MEDICINE

## 2021-11-05 PROCEDURE — 99254 IP/OBS CNSLTJ NEW/EST MOD 60: CPT | Performed by: INTERNAL MEDICINE

## 2021-11-05 RX ORDER — FONDAPARINUX SODIUM 7.5 MG/.6ML
7.5 INJECTION SUBCUTANEOUS
Status: DISCONTINUED | OUTPATIENT
Start: 2021-11-06 | End: 2021-11-06

## 2021-11-05 RX ORDER — POTASSIUM CHLORIDE 750 MG/1
20 TABLET, FILM COATED, EXTENDED RELEASE ORAL DAILY
Status: DISCONTINUED | OUTPATIENT
Start: 2021-11-05 | End: 2021-11-08 | Stop reason: HOSPADM

## 2021-11-05 RX ORDER — METOPROLOL SUCCINATE 100 MG/1
100 TABLET, EXTENDED RELEASE ORAL EVERY 12 HOURS SCHEDULED
Status: DISCONTINUED | OUTPATIENT
Start: 2021-11-05 | End: 2021-11-08 | Stop reason: HOSPADM

## 2021-11-05 RX ORDER — FUROSEMIDE 20 MG/1
20 TABLET ORAL
Status: DISCONTINUED | OUTPATIENT
Start: 2021-11-05 | End: 2021-11-08 | Stop reason: HOSPADM

## 2021-11-05 RX ADMIN — FUROSEMIDE 20 MG: 20 TABLET ORAL at 17:24

## 2021-11-05 RX ADMIN — POTASSIUM CHLORIDE 20 MEQ: 750 TABLET, EXTENDED RELEASE ORAL at 17:24

## 2021-11-05 RX ADMIN — DIGOXIN 125 MCG: 125 TABLET ORAL at 14:01

## 2021-11-05 RX ADMIN — SODIUM CHLORIDE, PRESERVATIVE FREE 10 ML: 5 INJECTION INTRAVENOUS at 19:45

## 2021-11-05 RX ADMIN — METOPROLOL SUCCINATE 100 MG: 100 TABLET, EXTENDED RELEASE ORAL at 09:09

## 2021-11-05 RX ADMIN — CHLORHEXIDINE GLUCONATE 15 ML: 1.2 RINSE ORAL at 19:43

## 2021-11-05 RX ADMIN — LOSARTAN POTASSIUM 50 MG: 50 TABLET, FILM COATED ORAL at 14:02

## 2021-11-05 RX ADMIN — SODIUM CHLORIDE, PRESERVATIVE FREE 10 ML: 5 INJECTION INTRAVENOUS at 09:09

## 2021-11-05 RX ADMIN — FONDAPARINUX SODIUM 2.5 MG: 2.5 INJECTION, SOLUTION SUBCUTANEOUS at 09:09

## 2021-11-05 RX ADMIN — GADOBENATE DIMEGLUMINE 20 ML: 529 INJECTION, SOLUTION INTRAVENOUS at 12:35

## 2021-11-05 NOTE — PROGRESS NOTES
"      [facility]         Dr Hicks Sayied   LOS: 7 days   Patient Care Team:  Provider, No Known as PCP - General    Chief Complaint: Acute liver failure suspected salicylate toxicity with cardiogenic shock coagulopathy lactic acidosis thrombocytopenia multiple issues ongoing    Interval History: Continues to do better.  No issues reported    REVIEW OF SYSTEMS:   No chest pain no shortness of breath at this time    Ventilator/Non-Invasive Ventilation Settings (From admission, onward)           Cannula oxygen    Vital Signs  Temp:  [97.9 °F (36.6 °C)-98.7 °F (37.1 °C)] 97.9 °F (36.6 °C)  Heart Rate:  [] 105  Resp:  [18] 18  BP: (100-140)/(80-97) 130/82    Intake/Output Summary (Last 24 hours) at 11/5/2021 1208  Last data filed at 11/5/2021 0400  Gross per 24 hour   Intake 360 ml   Output 1825 ml   Net -1465 ml     Flowsheet Rows      First Filed Value   Admission Height 175.3 cm (69\") Documented at 10/29/2021 1717   Admission Weight 99.8 kg (220 lb) Documented at 10/29/2021 1433          Physical Exam:  Patient is examined using the personal protective equipment as per guidelines from infection control for this particular patient as enacted.  Hand hygiene was performed before and after patient interaction.   General Appearance:   Awake no distress following simple commands  Neck midline trachea, no thyromegaly   Lungs:    Diminished breath sounds on the bases bilaterally    Heart:    Regular rhythm and normal rate, normal S1 and S2, no            murmur, no gallop, no rub, no click   Chest Wall:    No abnormalities observed   Abdomen:    Soft no masses felt   Extremities:  1+ edema, no cyanosis, no             redness.  Perfusion of lower extremity has improved  CNS no focal deficit simple commands  Skin no rashes no nodules  Musculoskeletal no cyanosis no clubbing normal range of motion     Results Review:        Results from last 7 days   Lab Units 11/04/21  0758 11/03/21  0316 11/03/21  0316 " 11/03/21  0026 11/02/21  1806 11/02/21  0924 11/02/21  0923 11/02/21  0403 11/02/21  0403   SODIUM mmol/L 138  --  143  --   --   --   --   --  142   POTASSIUM mmol/L 3.6  --  3.8  --   --  3.5  --   --  3.6   CHLORIDE mmol/L 98  --  105  --   --   --   --   --  102   CO2 mmol/L 30.7*  --  30.4*  --   --   --   --   --  28.4   BUN mg/dL 19  --  21*  --   --   --   --   --  30*   CREATININE mg/dL 0.87  --  1.02 1.06   < >  --    < >  --  1.63*   GLUCOSE mg/dL 97   < > 94  --   --   --   --    < > 108*   CALCIUM mg/dL 8.5*  --  8.1*  --   --   --   --   --  8.0*    < > = values in this interval not displayed.     Results from last 7 days   Lab Units 10/31/21  1527 10/31/21  0825 10/30/21  2227 10/30/21  1719 10/29/21  1247   CK TOTAL U/L 339* 347* 351*   < >  --    TROPONIN T ng/mL  --   --   --   --  0.038*    < > = values in this interval not displayed.     Results from last 7 days   Lab Units 11/04/21 0758 11/03/21  0932 11/03/21 0316 11/02/21 1806 11/02/21  1806   WBC 10*3/mm3 7.03  --  6.19  --  6.86   HEMOGLOBIN g/dL 13.2  --  11.4*  --  12.3*   HEMATOCRIT % 40.5  --  35.8*  --  37.0*   PLATELETS 10*3/mm3 46* 36* 36*   < > 38*    < > = values in this interval not displayed.     Results from last 7 days   Lab Units 11/04/21 0758 11/03/21 0316 11/02/21 1806   INR  1.18* 1.27* 1.26*   APTT seconds 27.4 30.1 30.5         Results from last 7 days   Lab Units 11/04/21  0758   MAGNESIUM mg/dL 1.8     Results from last 7 days   Lab Units 11/02/21  0403   TRIGLYCERIDES mg/dL 102     Results from last 7 days   Lab Units 11/02/21  0843   PH, ARTERIAL pH units 7.448   PO2 ART mm Hg 95.1   PCO2, ARTERIAL mm Hg 46.1*   HCO3 ART mmol/L 31.9*       I reviewed the patient's new clinical results.  I personally viewed and interpreted the patient's chest x-ray.        Medication Review:   chlorhexidine, 15 mL, Mouth/Throat, Q12H  digoxin, 125 mcg, Oral, Daily  fondaparinux, 2.5 mg, Subcutaneous, Q24H  gadobenate dimeglumine,  20 mL, Intravenous, Once in imaging  losartan, 50 mg, Oral, Q24H  metoprolol succinate XL, 100 mg, Oral, Q12H  sodium chloride, 10 mL, Intravenous, Q12H             ASSESSMENT:   1. Acute liver failure: Suspect salicylate toxicity per history, subacute/acute on chronic.  2. Acute cardiogenic shock  3. Acute respiratory failure on vent  4. A. fib with RVR  5. KRISHNA: Mostly volume depletion due to liver failure  6. Electrolytes disturbance: Acute life-threatening hyperkalemia.  Hyponatremia.  7. Coagulopathy, secondary to acute liver disease  8. Lactic acidosis, secondary to liver failure  9. Slightly evaded troponin: Likely secondary to KRISHNA  10. Erythrocytosis, likely more hemoconcentration  11. Bilateral pleural effusion, mild  12. Thrombocytopenia  13. Subcentimetric bilateral calcified nodules  14. Altered mental status  15. Catheter associated right IJ DVT      PLAN:  Respiratory status stable and improving.  Currently on nasal cannula oxygen.  Will wean oxygen to keep sats above 90%.  Cardiogenic shock much improved.  Plans per cardiology noted.  Improving kidney function and hyperkalemia now resolved.  Appreciate input from nephrology  Coagulopathy with thrombocytopenia.  Platelet counts gradually decreasing.  Hematology currently following  Electrolyte management per nephrology.  Creatinine stable and improving  I think this was all cardiogenic shock with CHF.  Attics have been discontinued  Diet per speech.  Hospitalist to take over care on the floor.          Kurt Evangelista MD  11/05/21  12:08 EDT

## 2021-11-05 NOTE — THERAPY TREATMENT NOTE
Acute Care - Physical Therapy Treatment Note  [facility]     Patient Name: Denis Chavez  : 1965  MRN: [id number]  Today's Date: 2021      Visit Dx:     ICD-10-CM ICD-9-CM   1. Atrial fibrillation with rapid ventricular response (HCC)  I48.91 427.31   2. KRISHNA (acute kidney injury) (HCC)  N17.9 584.9   3. Hyperkalemia  E87.5 276.7   4. Right upper quadrant abdominal pain  R10.11 789.01   5. Elevated LFTs  R79.89 790.6   6. Cardiogenic shock (HCC)  R57.0 785.51     Patient Active Problem List   Diagnosis   • Atrial fibrillation with rapid ventricular response (HCC)   • Right upper quadrant abdominal pain   • Shock liver   • Mitral insufficiency, acute   • Cardiogenic shock (HCC)   • Coagulopathy (HCC)   • Thrombocytopenia (HCC)   • Other specified anemias   • Hypofibrinogenemia (HCC)   • Acute deep vein thrombosis (DVT) of right upper extremity (HCC)   • DVT, lower extremity, proximal, acute, bilateral (HCC)   • Pseudoaneurysm of femoral artery following procedure (HCC)     History reviewed. No pertinent past medical history.  Past Surgical History:   Procedure Laterality Date   • CARDIAC CATHETERIZATION Right 10/29/2021    Procedure: Left Heart Cath;  Surgeon: Get Cunha MD;  Location: [facility] INVASIVE LOCATION;  Service: Cardiology;  Laterality: Right;   • CARDIAC CATHETERIZATION N/A 10/29/2021    Procedure: Coronary angiography;  Surgeon: Get Cunha MD;  Location: [facility] CATH INVASIVE LOCATION;  Service: Cardiology;  Laterality: N/A;   • CARDIAC ELECTROPHYSIOLOGY PROCEDURE N/A 10/30/2021    Procedure: IMPELLA INSERTION;  Surgeon: Get Cunha MD;  Location: [facility] CATH INVASIVE LOCATION;  Service: Cardiology;  Laterality: N/A;   • CARDIAC ELECTROPHYSIOLOGY PROCEDURE N/A 10/30/2021    Procedure: Cardioversion;  Surgeon: Get Cunha MD;  Location: [facility] CATH INVASIVE LOCATION;  Service: Cardiology;  Laterality: N/A;   • CARDIAC ELECTROPHYSIOLOGY PROCEDURE N/A 2021     Procedure: Impella Removal;  Surgeon: Get Cunha MD;  Location: [facility] INVASIVE LOCATION;  Service: Cardiology;  Laterality: N/A;     PT Assessment (last 12 hours)     PT Evaluation and Treatment     Row Name 11/05/21 1520          Physical Therapy Time and Intention    Subjective Information no complaints  -LB     Document Type therapy note (daily note)  -LB     Mode of Treatment physical therapy  -LB     Row Name 11/05/21 1520          General Information    Existing Precautions/Restrictions fall  -LB     Row Name 11/05/21 1520          Pain Scale: Numbers Pre/Post-Treatment    Pretreatment Pain Rating 0/10 - no pain  -LB     Row Name 11/05/21 1520          Bed Mobility    Bed Mobility sit-supine  -LB     Supine-Sit [place] (Bed Mobility) supervision  -LB     Sit-Supine [place] (Bed Mobility) supervision  -LB     Assistive Device (Bed Mobility) bed rails; head of bed elevated  -LB     Row Name 11/05/21 1520          Transfers    Sit-Stand [place] (Transfers) contact guard  -LB     Row Name 11/05/21 1520          Gait/Stairs (Locomotion)    [place] Level (Gait) contact guard; verbal cues  -LB     Assistive Device (Gait) other (see comments)  none  -LB     Distance in Feet (Gait) 200  -LB     Pattern (Gait) step-through  -LB     Deviations/Abnormal Patterns (Gait) gait speed decreased  -LB     Bilateral Gait Deviations forward flexed posture  -LB     Comment (Gait/Stairs) No LOB  -LB     Row Name             Wound 11/02/21 1630 Right throat Puncture    Wound - Properties Group Placement Date: 11/02/21  -NIC Placement Time: 1630  -NIC Present on Hospital Admission: N  -NIC Side: Right  -NIC Location: throat  -NIC Primary Wound Type: Puncture  -NIC Additional Comments: Central line removal  -NIC     Retired Wound - Properties Group Date first assessed: 11/02/21  -NIC Time first assessed: 1630  -NIC Present on Hospital Admission: N  -NIC Side: Right  -NIC Location: throat  -NIC Primary Wound Type:  Puncture  -NIC Additional Comments: Central line removal  -NIC     Row Name 11/05/21 1520          Positioning and Restraints    Post Treatment Position bed  -LB     In Bed supine; call light within reach; exit alarm on; with family/caregiver  -LB           User Key  (r) = Recorded By, (t) = Taken By, (c) = Cosigned By    Initials Name Provider Type    LB Rajwinder Neil PTA Physical Therapy Assistant    Ramandeep Dimas, RN Registered Nurse              Physical Therapy Education                 Title: PT OT SLP Therapies (In Progress)     Topic: Physical Therapy (In Progress)     Point: Mobility training (Done)     Learning Progress Summary           Patient Acceptance, E, VU,NR by LB at 11/5/2021 1520    Acceptance, E, VU,NR by CF at 11/4/2021 1545                   Point: Home exercise program (Not Started)     Learner Progress:  Not documented in this visit.          Point: Body mechanics (Done)     Learning Progress Summary           Patient Acceptance, E, VU,NR by CF at 11/4/2021 1545                   Point: Precautions (Done)     Learning Progress Summary           Patient Acceptance, E, VU,NR by CF at 11/4/2021 1545                               User Key     Initials Effective Dates Name Provider Type Discipline    LB 03/07/18 -  Rajwinder Neil PTA Physical Therapy Assistant PT    CF 06/16/21 -  Emma Hinds PT Physical Therapist PT              PT Recommendation and Plan       Patient was wearing a face mask during this therapy encounter. Therapist used appropriate personal protective equipment including mask and gloves.  Mask used was standard procedure mask. Appropriate PPE was worn during the entire therapy session. Hand hygiene was completed before and after therapy session. Patient is not in enhanced droplet precautions.          Time Calculation:    PT Charges     Row Name 11/05/21 1519             Time Calculation    Start Time 1504  -LB      Stop Time 1514  -LB      Time Calculation  (min) 10 min  -LOLY            User Key  (r) = Recorded By, (t) = Taken By, (c) = Cosigned By    Initials Name Provider Type    Rajwinder Coreas PTA Physical Therapy Assistant              Therapy Charges for Today     Code Description Service Date Service Provider Modifiers Qty    [id number] HC PT THER PROC EA 15 MIN 11/5/2021 Rajwinder Neil PTA GP 1          PT G-Codes  Outcome Measure Options: AM-PAC 6 Clicks Basic Mobility (PT)  AM-PAC 6 Clicks Score (PT): 17    Rajwinder Neil PTA  11/5/2021

## 2021-11-05 NOTE — PROGRESS NOTES
Patient with asymptomatic right radial artery occlusion and very small right superficial femoral artery pseudoaneurysm after emergent cardiac catheterization and Impella placement after cardiac arrest.  In addition, he does have right upper extremity DVT and bilateral lower extremity DVTs.  Unable to fully anticoagulate because of significant thrombocytopenia.  Currently on Arixtra.    Labs pending this a.m.    Right hand is warm and well-perfused and has a strong palpable radial pulse.  We will continue observation only for this.  He is scheduled to have a duplex scan of the right femoral artery to evaluate his pseudoaneurysm tomorrow, 11/6.

## 2021-11-05 NOTE — PROGRESS NOTES
Name: Denis Chavez ADMIT: 10/29/2021   : 1965  PCP: Provider, No Known    MRN: [id number] LOS: 7 days   AGE/SEX: 56 y.o. male  ROOM: 3/     Subjective   Subjective   Reports overall feeling dramatic improvement.  Had some mild chest pain yesterday but none today.    Review of Systems     Objective   Objective   Vital Signs  Temp:  [97.9 °F (36.6 °C)-98.1 °F (36.7 °C)] 97.9 °F (36.6 °C)  Heart Rate:  [] 108  Resp:  [18] 18  BP: (103-130)/(81-87) 105/87  SpO2:  [77 %-99 %] 93 %  on   ;   Device (Oxygen Therapy): nasal cannula  Body mass index is 30.75 kg/m².  Physical Exam  Vitals and nursing note reviewed.   Constitutional:       General: He is not in acute distress.     Appearance: Normal appearance.   Neck:      Vascular: No JVD.      Trachea: No tracheal deviation.   Cardiovascular:      Rate and Rhythm: Normal rate and regular rhythm.      Heart sounds: Normal heart sounds.   Pulmonary:      Effort: Pulmonary effort is normal. No respiratory distress.      Breath sounds: Normal breath sounds.   Abdominal:      General: Bowel sounds are normal.      Palpations: Abdomen is soft.      Tenderness: There is no abdominal tenderness.   Skin:     General: Skin is warm and dry.   Neurological:      General: No focal deficit present.      Mental Status: He is alert and oriented to person, place, and time.   Psychiatric:         Behavior: Behavior normal. Behavior is cooperative.         Results Review     I reviewed the patient's new clinical results.  Results from last 7 days   Lab Units 21  0758 21  0932 21  0316 21  0403 21  0403   WBC 10*3/mm3 7.03  --  6.19 6.86  --  7.01   HEMOGLOBIN g/dL 13.2  --  11.4* 12.3*  --  13.0   PLATELETS 10*3/mm3 46* 36* 36* 38*   < > 56*    < > = values in this interval not displayed.     Results from last 7 days   Lab Units 21  0758 21  0316 21  0026 21  18021  0924 21  0923  11/02/21 0403 11/01/21  1522 11/01/21 0426   SODIUM mmol/L 138 143  --   --   --   --  142  --  139   POTASSIUM mmol/L 3.6 3.8  --   --  3.5  --  3.6   < > 3.4*   CHLORIDE mmol/L 98 105  --   --   --   --  102  --  103   CO2 mmol/L 30.7* 30.4*  --   --   --   --  28.4  --  26.5   BUN mg/dL 19 21*  --   --   --   --  30*  --  37*   CREATININE mg/dL 0.87 1.02 1.06 1.28*  --    < > 1.63*   < > 1.85*  1.85*   GLUCOSE mg/dL 97 94  --   --   --   --  108*  --  94   EGFR IF NONAFRICN AM mL/min/1.73 90 75 72 58*  --    < > 44*   < > 38*  38*    < > = values in this interval not displayed.     Results from last 7 days   Lab Units 11/04/21 0758 11/03/21 0932 11/03/21 0316 11/02/21 0403 11/01/21 0426 11/01/21 0426   ALBUMIN g/dL 3.30* 2.8* 2.90* 3.20*   < > 2.60*   BILIRUBIN mg/dL 2.9*  --  3.5* 5.4*  --  3.4*   ALK PHOS U/L 105  --  65 62  --  54   AST (SGOT) U/L 245*  --  446* 985*  --  2,182*   ALT (SGPT) U/L 1,352*  --  1,781* 2,723*  --  3,451*    < > = values in this interval not displayed.     Results from last 7 days   Lab Units 11/04/21 0758 11/03/21 1951 11/03/21 0932 11/03/21 0316 11/02/21 0403 11/01/21 0426 11/01/21  0426 10/31/21  0250 10/31/21  0250 10/30/21  2227 10/30/21  1842   CALCIUM mg/dL 8.5*  --   --  8.1* 8.0*  --  7.4*   < > 7.2*  --    < >   ALBUMIN g/dL 3.30*  --  2.8* 2.90* 3.20*   < > 2.60*   < > 2.70*  --    < >   MAGNESIUM mg/dL 1.8  --   --  1.6  --   --   --   --  2.1 2.2  --    PHOSPHORUS mg/dL 2.7 2.7  --  2.3*  --   --   --   --   --   --   --     < > = values in this interval not displayed.     Results from last 7 days   Lab Units 11/03/21  0316 11/03/21  0026 11/02/21  1806 11/02/21  0924   LACTATE mmol/L 1.1 1.2 1.4 1.3     COVID19   Date Value Ref Range Status   10/29/2021 Not Detected Not Detected - Ref. Range Final     Glucose   Date/Time Value Ref Range Status   11/04/2021 1939 159 (H) 70 - 130 mg/dL Final     Comment:     Meter: [id number] : [id number] Fabián  Elmo STEVENSON   11/03/2021 1238 336 (H) 70 - 130 mg/dL Final     Comment:     Meter: [id number] : [id number] Rey STEVENSON       Duplex Venous Upper Extremity - Right CAR  Addendum: · Acute right upper extremity deep vein thrombosis noted in the internal  jugular and subclavian.   · Sub-acute right upper extremity deep vein thrombosis noted in the  brachial.   · Acute right upper extremity superficial thrombophlebitis noted in the  basilic (upper arm), cephalic (upper arm) and median cubital.   · Acute left upper extremity superficial thrombophlebitis noted in the  cephalic (upper arm).   · All other right sided vessels appear normal.   · Incidental finding of a right radial artery occlusion. Most likely  etiology would related to radial artery catheterization. Normal/triphasic  flow seen in the ulnar artery.   · This study was amended at 11:47 AM on 11/4/2021  Narrative: · Acute right upper extremity deep vein thrombosis noted in the internal   jugular and subclavian.  · Sub-acute right upper extremity deep vein thrombosis noted in the   brachial.  · Acute right upper extremity superficial thrombophlebitis noted in the   basilic (upper arm), cephalic (upper arm) and median cubital.  · Acute left upper extremity superficial thrombophlebitis noted in the   cephalic (upper arm).  · All other right sided vessels appear normal.     Duplex Venous Lower Extremity - Bilateral CAR  Addendum: · Acute right lower extremity deep vein thrombosis noted in the common  femoral, deep femoral and gastrocnemius.   · Acute left lower extremity deep vein thrombosis noted in the common  femoral.   · All other veins appeared normal bilaterally.   · Incidental finding of a small left femoral pseudoaneurysm (1.15 x  0.73cm) visualized off the origin of the superficial femoral artery.   · This study was amended at 11:45 AM on 11/4/2021  Narrative: · Acute right lower extremity deep vein thrombosis noted in the common   femoral, deep  femoral and gastrocnemius.  · Acute left lower extremity deep vein thrombosis noted in the common   femoral.  · All other veins appeared normal bilaterally.       Scheduled Medications  chlorhexidine, 15 mL, Mouth/Throat, Q12H  digoxin, 125 mcg, Oral, Daily  fondaparinux, 2.5 mg, Subcutaneous, Q24H  losartan, 50 mg, Oral, Q24H  metoprolol succinate XL, 100 mg, Oral, Q12H  sodium chloride, 10 mL, Intravenous, Q12H    Infusions   Diet  Diet Regular; Cardiac, Consistent Carbohydrate       Assessment/Plan     Active Hospital Problems    Diagnosis  POA   • **Cardiogenic shock (HCC) [R57.0]  Yes   • Acute deep vein thrombosis (DVT) of right upper extremity (HCC) [I82.621]  Unknown   • DVT, lower extremity, proximal, acute, bilateral (HCC) [I82.4Y3]  Unknown   • Pseudoaneurysm of femoral artery following procedure (HCC) [T81.718A, I72.4]  Unknown   • Other specified anemias [D64.89]  Unknown   • 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 male admitted with Cardiogenic shock (HCC).    Labs still pending.  LFTs have been trending down.  Renal function improving.  Platelets have started to come up, hopefully.  Appreciate hematology assistance.  Currently on low-dose Arixtra.    Cardiac MRI done, results pending.  Cardiology following.  EP to see.  Vascular planning Doppler study in morning to evaluate right femoral pseudoaneurysm following calf.      · Fondaparinux, 2.5 mg, Subcutaneous, Q24H for DVT prophylaxis/treatment.  · Full code.  · Discussed with patient and  family.  · Anticipate discharge home with home health when cleared by consultants.      Edy Khan MD  [facility]  11/05/21  14:55 EDT

## 2021-11-05 NOTE — PROGRESS NOTES
"Denis Chavez  1965 56 y.o.  [id number]      Patient Care Team:  Provider, No Known as PCP - General    CC: Cardiogenic shock treated with Impella and recovered, atrial fibrillation    Interval History: Doing better      Objective   Vital Signs  Temp:  [97.9 °F (36.6 °C)-98.7 °F (37.1 °C)] 97.9 °F (36.6 °C)  Heart Rate:  [] 105  Resp:  [18] 18  BP: (100-153)/() 130/82    Intake/Output Summary (Last 24 hours) at 11/5/2021 1108  Last data filed at 11/5/2021 0400  Gross per 24 hour   Intake 360 ml   Output 1825 ml   Net -1465 ml     Flowsheet Rows      First Filed Value   Admission Height 175.3 cm (69\") Documented at 10/29/2021 1717   Admission Weight 99.8 kg (220 lb) Documented at 10/29/2021 1433          Physical Exam:   General Appearance:    Alert,oriented, in no acute distress   Lungs:     Clear to auscultation,BS are equal    Heart:    Normal S1 and S2, iRRR without murmur, gallop or rub   HEENT:    Sclerae are clear, no JVD or adenopathy   Abdomen:     Normal bowel sounds, soft nontender, nondistended, no HSM   Extremities:   Moves all extremities well, no edema, no cyanosis, no             Redness, no rash     Medication Review:      chlorhexidine, 15 mL, Mouth/Throat, Q12H  digoxin, 125 mcg, Oral, Daily  fondaparinux, 2.5 mg, Subcutaneous, Q24H  losartan, 50 mg, Oral, Q24H  metoprolol succinate XL, 100 mg, Oral, Q12H  sodium chloride, 10 mL, Intravenous, Q12H             I reviewed the patient's new clinical results.  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       Doing pretty well but continues to have A. fib with RVR the exact etiology of his shock is unclear could be a tachycardia mediated cardiomyopathy or myocarditis.  We were hoping to get a cardiac MRI but the patient does not really want to have that done if he is to get gadolinium I talked with him about it and encouraged him to get it done.  He is also got some low platelet issues and has had a couple of clotting problems.  He also has a small pseudoaneurysm evidently at his right femoral access site is being followed by the vascular people.  Overall he is improving and when asked the EP service to see him to help with his rate control    Get Cunha MD  11/05/21  11:08 EDT

## 2021-11-05 NOTE — PROGRESS NOTES
[facility] INPATIENT PROGRESS NOTE    Length of Stay:  7 days    CHIEF COMPLAINT: Cardiogenic shock with multiorgan failure, coagulopathy, thrombocytopenia, right upper extremity/IJ DVT      SUBJECTIVE: Patient today is doing quite well.  He has moved out of the intensive care unit.  He is sitting up in bed, not requiring any oxygen, no significant complaints currently.  He denies any bleeding issues.      ROS:  Review of Systems a comprehensive review of systems was obtained with pertinent positive findings as noted in the interval history above.  All other systems negative.    OBJECTIVE:  Vitals:    11/05/21 1405 11/05/21 1407 11/05/21 1409 11/05/21 1537   BP:    113/91   BP Location:    Left arm   Patient Position:    Lying   Pulse: 110 105 108    Resp:    18   Temp:    98.5 °F (36.9 °C)   TempSrc:    Oral   SpO2:       Weight:       Height:             PHYSICAL EXAMINATION:  General: Alert and oriented x3 no distress  Neck: Right IJ central catheter has been removed  Chest/Lungs: Clear to auscultation bilaterally  Heart: Irregular, no murmurs gallops or rubs  Abdomen/GI: Soft nontender nondistended bowel sounds present  Extremities: No significant edema right upper extremity.  Trace edema bilateral lower extremities    DIAGNOSTIC DATA:  Results Review:     I reviewed the patient's new clinical results.    Results from last 7 days   Lab Units 11/05/21  1442 11/04/21  0758 11/03/21  0932 11/03/21 0316 11/03/21 0316   WBC 10*3/mm3 6.92 7.03  --   --  6.19   HEMOGLOBIN g/dL 14.0 13.2  --   --  11.4*   HEMATOCRIT % 40.8 40.5  --   --  35.8*   PLATELETS 10*3/mm3 81* 46* 36*   < > 36*    < > = values in this interval not displayed.      Results from last 7 days   Lab Units 11/04/21  0758 11/03/21  0316 11/03/21  0026 11/02/21  1806 11/02/21  0924 11/02/21  0923 11/02/21  0403   SODIUM mmol/L 138 143  --   --   --   --  142   POTASSIUM mmol/L 3.6 3.8  --   --  3.5  --  3.6   CHLORIDE mmol/L 98 105  --    --   --   --  102   CO2 mmol/L 30.7* 30.4*  --   --   --   --  28.4   BUN mg/dL 19 21*  --   --   --   --  30*   CREATININE mg/dL 0.87 1.02 1.06   < >  --    < > 1.63*   CALCIUM mg/dL 8.5* 8.1*  --   --   --   --  8.0*   BILIRUBIN mg/dL 2.9* 3.5*  --   --   --   --  5.4*   ALK PHOS U/L 105 65  --   --   --   --  62   ALT (SGPT) U/L 1,352* 1,781*  --   --   --   --  2,723*   AST (SGOT) U/L 245* 446*  --   --   --   --  985*   GLUCOSE mg/dL 97 94  --   --   --   --  108*    < > = values in this interval not displayed.      Lab Results   Component Value Date    NEUTROABS 5.20 11/05/2021     Results from last 7 days   Lab Units 11/05/21  1442 11/04/21  0758 11/03/21  0316   INR  1.15* 1.18* 1.27*   APTT seconds 30.8 27.4 30.1     Results from last 7 days   Lab Units 11/04/21  0758   MAGNESIUM mg/dL 1.8       Assessment/Plan   ASSESSMENT/PLAN:  This is a 56 y.o. male with:     Cardiogenic shock, atrial fibrillation  · Biventricular failure, etiology unclear  · Patient requiring pressor support  · Patient had transthoracic echocardiogram, and cardiac catheterization on 10/29/2021.  · Status post cardioversion x2, persistent atrial fibrillation  · Impella device placed, initiated heparin  · Patient had POOJA on 10/30/2021 with ejection fraction 26-30%, severe dilation right ventricular cavity, moderate mitral regurgitation, Impella device in left ventricle.  · Investigation regarding potential cardiac transplant per cardiology, does not appear feasible due to lack of social support.  · Patient currently off of pressors, continuing on milrinone  · Bedside echocardiogram 11/2/2021 with ejection fraction 50%, low normal, normal right ventricular systolic function.  · Impella device removed on 11/2/2021, flat rate heparin discontinued as well  · Cardiac MRI performed with result pending 11/5/2021  · Per Dr. Ma, patient will require ongoing anticoagulation due to atrial fibrillation.     *Acute hypoxic respiratory failure     · Patient previously required ventilatory support  · Patient extubated on 11/2/2021  · Complete resolution of pulmonary symptoms     *Shock liver  · Severe hepatic dysfunction secondary to cardiogenic shock  · Transaminases gradually trending down with last labs on 11/4/2021 with ALT 1352, , total bilirubin 2.9.  Labs ordered today however were not drawn this morning, CMP pending.    *KRISHNA  · Secondary to cardiogenic shock  · Nephrology following  · Creatinine has continued to improve, pending from today     *Coagulopathy with hypofibrinogenemia/DIC  · Likely secondary to shock liver and reduced synthetic function in addition to possible effects from Impella device producing consumption  · Heparin drip initiated with placement of Impella device  · Patient has received FFP, cryoprecipitate, vitamin K  · PT mixing study 10/31/2021 with decreased from 22.3 down to 14.9 (near correction indicating likely a factor deficiency)  · Reluctant to administer further FFP/cryoprecipitate in the setting of active thrombosis (see below).  · Subsequent improvement in coagulation parameters spontaneously  · Labs today with INR INR 1.15, PTT 30.8, fibrinogen 298. Platelet count improving at 81,000 today    *Right upper extremity/IJ catheter associated/IJ DVT 10/31/2021 with subsequent acute right upper extremity IJ and subclavian, subacute right brachial DVT, acute bilateral upper extremity superficial thrombophlebitis, acute right femoral/calf DVT, acute left femoral DVT 11/3/2021  · Doppler on 10/31/2021 with acute right upper extremity catheter associated IJ thrombus, acute left upper extremity superficial thrombophlebitis (cephalic).  · Patient was previously receiving fixed rate heparin with Impella device (heparin initiated 10/30/2021) until Impella device was removed on 11/2/2021  · Orders placed for initiation of full dose heparin on 11/2/2021 however heparin was not initiated due to platelet count of  38,000.  · Dopplers on 11/3/2021 showed acute right upper extremity IJ and subclavian, subacute right brachial DVT, acute bilateral upper extremity superficial thrombophlebitis, acute right femoral/calf DVT, acute left femoral DVT  · Incidental finding on Dopplers of right radial artery occlusion (secondary to radial artery catheterization) and small left femoral artery pseudoaneurysm.  Vascular surgery consulted, perfusion normal right hand, no intervention needed.  Plan duplex right femoral artery to reevaluate pseudoaneurysm on 11/6/2021.  · With concern for heparin-induced thrombocytopenia and extensive thrombosis, initiated anticoagulation on 11/4/2021 with Arixtra 2.5 mg daily in the setting of platelet count 46,000.  Plan to gradually increase Arixtra dose as platelet count improves.  · Today, platelet count up to 81,000.  Heparin antiplatelet antibody and serotonin release assay still pending.  With improvement in the platelet count we will go ahead and increase Arixtra to full dose 7.5 mg daily tomorrow.  No bleeding issues on anticoagulation.    *Thrombocytopenia.   · No prior labs available  · On admission 10/29/2021 platelets 131,000.   · Platelets trended down, 71,000 on 10/30/2021  · Labs on 10/31/2021 with negative heparin antiplatelet antibody with KASEY 0.121 OD.  · CT abdomen and pelvis 10/29/2021 with normal spleen.  · Thrombocytopenia felt to be related to consumption initially due to shock/DIC with exacerbation by Impella device  · Patient received platelet transfusion 2 units around time of placement of Impella device  · On 10/31/2021 platelet count 44,000, received 1 additional unit platelets with increase to 79,000  · Impella device was removed on 11/2/2021  · Platelet count declined further down to 38,000 on 11/2/2021 and 36,000 on 11/3/2021. With resolution of DIC by coag parameters and removal of Impella device, unclear as to the etiology of the worsening thrombocytopenia.   · On  11/3/2021, sent additional evaluation with evaluation today with B12 greater than 2000, folate 17.1, IPF 7.7% (elevated).  Serum protein electrophoresis and immunoelectrophoresis negative with normal quantitative immunoglobulins.  Additional labs pending with free serum light chains as well as repeat heparin antiplatelet antibody as well as serotonin release assay.   · Given the presence of multiple areas of thrombosis, suspicion for possible heparin-induced thrombocytopenia  · On 11/4/2021 with platelet count of 46,000, initiated Arixtra 2.5 mg subcutaneous injection daily with plans to gradually increase dose pending improvement in platelet count.  · Platelet count improved today at 81,000     *Erythrocytosis  · Hematocrit on admission was 53.3 10/29/2021  · Hematocrit has gradually declined since admission into the 12-13 range  · Current hematocrit 40.8    *Possible pneumonia/sepsis  · Patient received empiric Zosyn, completed course.     PLAN:  1. Increase Arixtra dose tomorrow to 7.5 mg subcutaneous injection daily due to improvement in platelet count  2. We will need to avoid any heparin products pending the heparin antiplatelet antibody/serotonin release assay results  3. Labs pending from 11/3/2021 with free serum light chains, repeat heparin antiplatelet antibody and serotonin release assay.  4. Appreciate vascular surgery consultation  5. Daily CBC, CMP, PT, PTT, fibrinogen    Discussed with patient at bedside           Denis Melgoza MD

## 2021-11-05 NOTE — CONSULTS
Patient Name: Denis Chavez  Age/Sex: 56 y.o. male  : 1965  MRN: [id number]    Date of Admission: 10/29/2021  Date of Encounter Visit: 21  Encounter Provider: Jose Salcedo MD  Place of Service: [facility] CARDIOLOGY      Referring Provider: Maria Elena Cotton MD  Patient Care Team:  Provider, No Known as PCP - General    Subjective:       Chief Complaint:   Atrial fibrillation    History of Present Illness:  Denis Chavez is a 56 y.o. male presented to the emergency room the other day in cardiogenic shock.  He was treated with Impella placement.  He was noted to be in rapid atrial fibrillation.  Cardioversion attempt was made, but he quickly relapsed into atrial fibrillation.    He has significantly improved already with treatment with hemodynamic support and AV kay blockade.  He was able to be extubated, and lactate and LFTs continue to improve.    He denies any prior history of atrial fibrillation, but seems to have some idea that he was in atrial fibrillation for several months prior to presentation.  He associates it with symptom of increased fatigue.    He says he now feels much improved, near baseline.          Past Medical History:  History reviewed. No pertinent past medical history.    Past Surgical History:   Procedure Laterality Date   • CARDIAC CATHETERIZATION Right 10/29/2021    Procedure: Left Heart Cath;  Surgeon: Get Cunha MD;  [facility];  Service: Cardiology;  Laterality: Right;   • CARDIAC CATHETERIZATION N/A 10/29/2021    Procedure: Coronary angiography;  Surgeon: Get Cunha MD;  [facility];  Service: Cardiology;  Laterality: N/A;   • CARDIAC ELECTROPHYSIOLOGY PROCEDURE N/A 10/30/2021    Procedure: IMPELLA INSERTION;  Surgeon: Get Cunha MD;  Location:  [facility] CATH INVASIVE LOCATION;  Service: Cardiology;  Laterality: N/A;   • CARDIAC ELECTROPHYSIOLOGY PROCEDURE N/A 10/30/2021     Procedure: Cardioversion;  Surgeon: Get Cunha MD;  Location:  [facility] CATH INVASIVE LOCATION;  Service: Cardiology;  Laterality: N/A;   • CARDIAC ELECTROPHYSIOLOGY PROCEDURE N/A 11/2/2021    Procedure: Impella Removal;  Surgeon: Gte Cunha MD;  Location:  [facility] CATH INVASIVE LOCATION;  Service: Cardiology;  Laterality: N/A;       Home Medications:   No medications prior to admission.       Allergies:  No Known Allergies    Past Social History:  Social History     Socioeconomic History   • Marital status:    Tobacco Use   • Smoking status: Former Smoker   • Smokeless tobacco: Never Used   Substance and Sexual Activity   • Alcohol use: Never   • Drug use: Never   • Sexual activity: Defer        Past Family History:  History reviewed. No pertinent family history.    Review of Systems: All systems reviewed. Pertinent positives identified in HPI. All other systems are negative.     Review of Systems   Constitutional: Positive for malaise/fatigue.   HENT: Negative.    Eyes: Negative.    Cardiovascular: Positive for palpitations. Negative for chest pain, dyspnea on exertion, leg swelling and near-syncope.   Respiratory: Negative for cough and shortness of breath.    Endocrine: Negative.    Hematologic/Lymphatic: Negative.    Skin: Negative.    Musculoskeletal: Negative.    Gastrointestinal: Negative.    Genitourinary: Negative.    Neurological: Negative.  Negative for weakness.   Psychiatric/Behavioral: Negative.    Allergic/Immunologic: Negative.          Objective:     Objective:  Temp:  [97.9 °F (36.6 °C)-98.5 °F (36.9 °C)] 98.5 °F (36.9 °C)  Heart Rate:  [] 108  Resp:  [18] 18  BP: (103-130)/(81-91) 113/91    Intake/Output Summary (Last 24 hours) at 11/5/2021 1804  Last data filed at 11/5/2021 0400  Gross per 24 hour   Intake 360 ml   Output 925 ml   Net -565 ml     Body mass index is 30.75 kg/m².      11/01/21  1611 11/03/21  0501 11/05/21  0400   Weight: 112 kg (246 lb 11.1 oz) (not weighed  by RD) 108 kg (237 lb 3.4 oz) 94.5 kg (208 lb 4.8 oz)           Physical Exam:   Vitals and nursing note reviewed.   Constitutional:       Appearance: Normal and healthy appearance.   HENT:      Head: Normocephalic.    Mouth/Throat:      Pharynx: Oropharynx is clear.   Pulmonary:      Effort: Pulmonary effort is normal.      Breath sounds: Normal breath sounds.   Cardiovascular:      Tachycardia present. Irregular rhythm.      Murmurs: There is no murmur.   Edema:     Peripheral edema absent.   Skin:     General: Skin is warm.   Neurological:      General: No focal deficit present.      Mental Status: Alert, oriented to person, place, and time and oriented to person, place and time.   Psychiatric:         Attention and Perception: Attention and perception normal.         Mood and Affect: Mood and affect normal.         Behavior: Behavior is cooperative.         Cognition and Memory: Cognition and memory normal.           Lab Review:     Results from last 7 days   Lab Units 11/05/21  1442 11/04/21  0758 11/04/21 0758 11/03/21 0316 11/03/21 0316   SODIUM mmol/L 137  --  138  --  143   POTASSIUM mmol/L 3.4*  --  3.6  --  3.8   CHLORIDE mmol/L 100  --  98  --  105   CO2 mmol/L 29.2*  --  30.7*  --  30.4*   BUN mg/dL 19  --  19  --  21*   CREATININE mg/dL 1.14  --  0.87  --  1.02   GLUCOSE mg/dL 136*   < > 97   < > 94   CALCIUM mg/dL 8.6  --  8.5*  --  8.1*    < > = values in this interval not displayed.       Results from last 7 days   Lab Units 10/31/21  1527 10/31/21  0825 10/30/21  2227   CK TOTAL U/L 339* 347* 351*     Results from last 7 days   Lab Units 11/05/21  1442   WBC 10*3/mm3 6.92   HEMOGLOBIN g/dL 14.0   HEMATOCRIT % 40.8   PLATELETS 10*3/mm3 81*     Results from last 7 days   Lab Units 11/05/21  1442 11/04/21 0758 11/03/21 0316   INR  1.15* 1.18* 1.27*   APTT seconds 30.8 27.4 30.1         Results from last 7 days   Lab Units 11/05/21  1442   MAGNESIUM mg/dL 1.9     Results from last 7 days   Lab  Units 11/02/21  0403   TRIGLYCERIDES mg/dL 102             Results from last 7 days   Lab Units 10/30/21  1719   TSH uIU/mL 1.010       Imaging:    Imaging Results (Most Recent)     Procedure Component Value Units Date/Time    MRI Cardiac Function Complete With & Without Morphology [[id number]] Resulted: 11/05/21 1549     Updated: 11/05/21 1601    Narrative:      Cardiac MRI  11/05/21    The pulmonic valve is normal in structure and function.  The tricuspid valve is normal in structure and function.  The mitral valve is normal but there is mitral regurgitation noted.  The aortic valve is normal in structure and function.    There is severe global hypokinesis of the left ventricle.  There is hypokinesis of the right ventricle.  There is moderate biatrial enlargement.  There is epicardial fat noted. There is no significant pericardial   effusion.   The aortic root is normal in size.     There is normal perfusion of the myocardium.    The coronary arteries are visualized and run in their expected course.    The right coronary artery comes off the right coronary cusp.  The left   main coronary artery comes off the left coronary cusp and bifurcates into   the left anterior descending artery and circumflex coronary artery.     There is no delayed enhancement.           Conclusion:  1. Severe global hypokinesis of the left ventricle.  2.  Right ventricle is hypokinetic.  3.  Moderate biatrial enlargement  4.  Mitral valve regurgitation is noted  5.  No evidence of delayed enhancement or infiltrative cardiomyopathy.  6.  Normal perfusion of the myocardium.  The MRI on call does not show any delayed enhancement.  I have called the   lab to see if they will expedite getting me the EF number so I can get the   report in the computer before I leave today but it does not sound   promising.  They are apparently pretty short staffed.    Lulu Bojorquez MD  11/05/21      XR Chest 1 View [[id number]] Collected: 10/31/21 0575      Updated: 10/31/21 0519    Narrative:      SINGLE VIEW OF THE CHEST     HISTORY: Oxygen desaturation     COMPARISON: 10/30/2021     FINDINGS:  Tubes and lines are stable in position. Cardiomegaly is present. There  is vascular congestion. No pneumothorax is identified. No definite  effusion is seen.       Impression:      No significant interval change.     This report was finalized on 10/31/2021 5:16 AM by Dr. Henny Galvez M.D.       XR Chest 1 View [[id number]] Collected: 10/30/21 1839     Updated: 10/30/21 1941    Narrative:      STAT PORTABLE CHEST 10/30/2021     CLINICAL HISTORY: Oxygen desaturation.     COMPARISON: This is correlated to a portable chest x-ray earlier this  morning, 10/30/2021 at 3:45 AM.     FINDINGS: An endotracheal tube is in place and its tip projects over the  thoracic tracheal air column 6 cm above the anel in good position. A  right internal jugular pulmonary artery catheter is in place and its  distal tip is in the proximal right pulmonary artery, unchanged in  position and in good position. There has been interval placement of an  Impala catheter that courses up the descending thoracic aorta over the  aortic arch and its distal hook is in the expected location of the left  ventricle. The cardiomediastinal silhouette is mildly enlarged. There is  perhaps mild pulmonary vasculature engorgement. There is a very subtle  hazy opacity in the perihilar regions bilaterally in a symmetric  fashion. Very subtle, very faint early pulmonary alveolar edema is  difficult to exclude and should be correlated clinically. The remainder  of the lungs are clear. The costophrenic angles are sharp.      The results were communicated to Amy, the nurse in the ICU taking care  of the patient, by telephone 10/30/2021 at 6:10 pm.     This report was finalized on 10/30/2021 7:38 PM by Dr. Vincent Galicia M.D.       XR Chest 1 View [[id number]] Collected: 10/30/21 0710     Updated: 10/30/21 0818    Narrative:       ONE VIEW PORTABLE CHEST AT 3:45 AM     HISTORY: CHF. Pleural effusions.     FINDINGS:  There is cardiomegaly with some minimal haziness in the upper  right chest which may relate to pleural fluid layering posteriorly as  suggested on the CT scan performed yesterday. The haziness is slightly  more prominent when compared to the portable chest x-ray performed  yesterday at 8:24 PM. There is improvement in similar haziness that was  present on the left.     A PA line ends in the right main pulmonary artery and ET tube ends 7 cm  above the anel.     This report was finalized on 10/30/2021 8:15 AM by Dr. Alex Clark M.D.       XR Chest 1 View [[id number]] Collected: 10/29/21 2047     Updated: 10/29/21 2052    Narrative:      PORTABLE CHEST 10/29/2021 8:24 PM     CLINICAL HISTORY: Evaluate ET tube placement and central line placement.     Compared to the previous chest dated 10/29/2021 at 1159 hours.     In the interval, an endotracheal tube has been inserted and appears in  satisfactory position with its tip at the level of the clavicular heads.  A right internal jugular Gans-Gwyn catheter has also been inserted and  has its tip in the right main pulmonary artery or proximal aspect of the  interlobar pulmonary artery. The lungs are fairly well-expanded and  appear free of infiltrates. There is no pneumothorax. There are no  pleural effusions. The heart is moderately enlarged and unchanged.     IMPRESSIONS: Moderate cardiomegaly. ET tube and Gans-Gwyn catheter in  satisfactory position. No acute process is identified.     This report was finalized on 10/29/2021 8:49 PM by Dr. Dave Mcdonough M.D.       CT Chest Without Contrast Diagnostic [[id number]] Collected: 10/29/21 1521     Updated: 10/29/21 1626    Narrative:      CT CHEST, ABDOMEN, AND PELVIS WITHOUT CONTRAST.     HISTORY: 56-year-old male with acute renal failure. Abnormal LFTs.  Tachycardia and arrhythmia.     TECHNIQUE: Radiation dose reduction  techniques were utilized, including  automated exposure control and exposure modulation based on body size.   3 mm images were obtained through the chest, abdomen, and pelvis without  the administration of IV contrast. There are no previous CTs for  comparison.     FINDINGS:  CHEST: There are small-moderate-sized bilateral pleural effusions and  there is compressive atelectasis at the lower lobes. There is no  interstitial edema. The heart is enlarged. There are a few tiny coronary  artery calcifications. There is no pericardial effusion. There is no  lymphadenopathy within the chest.     ABDOMEN/PELVIS: The liver is enlarged and there is a tiny amount of  perihepatic fluid. There is also a tiny amount of fluid surrounding the  gallbladder and there is a very small amount of free fluid within the  lower abdomen and pelvis. There is focal fatty infiltration at the  medial hepatic segment. The gallbladder is distended, but does not have  a thickened wall. Splenic size is normal. Noncontrasted pancreas,  adrenals, and kidneys appear unremarkable other than a single tiny  nonobstructing stone at the lower pole of the left kidney. No ureteral  stones are seen and there is no hydronephrosis bilaterally. There is a  fairly large left inguinal hernia containing a long segment of sigmoid  colon as well as the left aspect of the bladder dome without obstruction  or incarceration. The rest of the small bowel appears unremarkable.  Appendix appears within normal limits.       Impression:      1. The findings suggest passive hepatic congestion with a tiny amount of  free fluid and 3rd spacing of fluid within the body wall. There are also  small-moderate-sized bilateral pleural effusions.  2. Moderately large left inguinal hernia containing a long segment of  sigmoid colon and bladder dome without obstruction or incarceration.  3. Cardiomegaly.     Discussed with Dr. Gillespie.     This report was finalized on 10/29/2021 4:23 PM by  Dr. Misti Georges M.D.       CT Abdomen Pelvis Without Contrast [[id number]] Collected: 10/29/21 1521     Updated: 10/29/21 1626    Narrative:      CT CHEST, ABDOMEN, AND PELVIS WITHOUT CONTRAST.     HISTORY: 56-year-old male with acute renal failure. Abnormal LFTs.  Tachycardia and arrhythmia.     TECHNIQUE: Radiation dose reduction techniques were utilized, including  automated exposure control and exposure modulation based on body size.   3 mm images were obtained through the chest, abdomen, and pelvis without  the administration of IV contrast. There are no previous CTs for  comparison.     FINDINGS:  CHEST: There are small-moderate-sized bilateral pleural effusions and  there is compressive atelectasis at the lower lobes. There is no  interstitial edema. The heart is enlarged. There are a few tiny coronary  artery calcifications. There is no pericardial effusion. There is no  lymphadenopathy within the chest.     ABDOMEN/PELVIS: The liver is enlarged and there is a tiny amount of  perihepatic fluid. There is also a tiny amount of fluid surrounding the  gallbladder and there is a very small amount of free fluid within the  lower abdomen and pelvis. There is focal fatty infiltration at the  medial hepatic segment. The gallbladder is distended, but does not have  a thickened wall. Splenic size is normal. Noncontrasted pancreas,  adrenals, and kidneys appear unremarkable other than a single tiny  nonobstructing stone at the lower pole of the left kidney. No ureteral  stones are seen and there is no hydronephrosis bilaterally. There is a  fairly large left inguinal hernia containing a long segment of sigmoid  colon as well as the left aspect of the bladder dome without obstruction  or incarceration. The rest of the small bowel appears unremarkable.  Appendix appears within normal limits.       Impression:      1. The findings suggest passive hepatic congestion with a tiny amount of  free fluid and 3rd spacing of fluid  within the body wall. There are also  small-moderate-sized bilateral pleural effusions.  2. Moderately large left inguinal hernia containing a long segment of  sigmoid colon and bladder dome without obstruction or incarceration.  3. Cardiomegaly.     Discussed with Dr. Gillespie.     This report was finalized on 10/29/2021 4:23 PM by Dr. Misti Georges M.D.       XR Chest 1 View [[id number]] Collected: 10/29/21 1227     Updated: 10/29/21 1230    Narrative:      CHEST SINGLE VIEW     HISTORY: Shortness of air     COMPARISON: None     FINDINGS: The heart size is enlarged. There are several subcentimeter  pulmonary nodules that appear calcified and are consistent with  calcified granulomas. There are no previous exams for comparison. Lungs  appear clear of focal airspace disease and there is no evidence for  pulmonary edema or pleural effusion or infiltrate. Cardiac monitoring  leads are noted.       Impression:      Subcentimeter bilateral calcified nodules are present  consistent with calcified granulomas. Heart size is enlarged. There is  no convincing evidence for active disease in the chest.     This report was finalized on 10/29/2021 12:27 PM by Dr. Kip Garcia M.D.             EKG:   I reviewed his presenting EKG.  He was in atrial fibrillation with rapid ventricular response.      Baseline:     I personally viewed and interpreted the patient's EKG/Telemetry tracings.    Assessment:   Assessment/Plan     1.  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  2.  Persistent atrial fibrillation  3.  Elevated LFTs, transaminitis    Plan:      Medication has had significant clinical improvement already.  There was concern that his rate control was not adequate, but I think his improvement is evidence that it is.  Generally, we should try to keep his heart rate below 110 bpm.  He is currently on metoprolol succinate 100 mg twice daily, and  digoxin.    I would be reluctant to use Cardizem given recent cardiogenic shock, and suggestion of decreased ejection fraction on cardiac MRI today.    When his LFTs have completely recovered, which I suspect are elevated due to cardiogenic shock, we should load him with amiodarone and cardiovert him.  However this is going to be a several week process.    Thank you for allowing me to participate in the care of Denis Chavez. Feel free to contact me directly with any further questions or concerns.    Jose Salcedo MD  11/05/21  18:04 EDT

## 2021-11-05 NOTE — PROGRESS NOTES
"DAILY PROGRESS NOTE  [facility]    Patient Identification:  Name: Denis Chavez  Age: 56 y.o.  Sex: male  :  1965  MRN: [id number]         Primary Care Physician: Provider, No Known    Subjective: patient is sitting up; having second thought about having his mri with contrast done; denies pain  Interval History: follow up for liver failure, cardiogenic shock, a fib, thrombocytopenia, katharina    Objective:    Scheduled Meds:chlorhexidine, 15 mL, Mouth/Throat, Q12H  digoxin, 125 mcg, Oral, Daily  fondaparinux, 2.5 mg, Subcutaneous, Q24H  losartan, 50 mg, Oral, Q24H  metoprolol succinate XL, 75 mg, Oral, Q12H  sodium chloride, 10 mL, Intravenous, Q12H      Continuous Infusions:     Vital signs in last 24 hours:  Temp:  [98 °F (36.7 °C)-98.9 °F (37.2 °C)] 98.7 °F (37.1 °C)  Heart Rate:  [] 132  Resp:  [18] 18  BP: (100-163)/() 111/85    Intake/Output:    Intake/Output Summary (Last 24 hours) at 2021  Last data filed at 2021 1800  Gross per 24 hour   Intake 600 ml   Output 2950 ml   Net -2350 ml       Exam:  /85 (BP Location: Left arm)   Pulse (!) 132   Temp 98.7 °F (37.1 °C) (Oral)   Resp 18   Ht 175.3 cm (69.02\")   Wt 108 kg (237 lb 3.4 oz)   SpO2 (!) 88%   BMI 35.01 kg/m²     General Appearance:    Alert, cooperative, no distress, AAOx3                          Head:    Normocephalic, without obvious abnormality, atraumatic                           Eyes:    PERRL, conjunctivae/corneas clear, EOM's intact, both eyes                         Throat:   Lips, tongue, gums normal; oral mucosa pink and moist                           Neck:   Supple, symmetrical, trachea midline, no JVD                         Lungs:    Decreased breath sounds bilaterally, respirations unlabored                 Chest Wall:    No tenderness or deformity                          Heart:    Regular rate and rhythm, S1 and S2 normal, no murmur,no  rub or gallop                  Abdomen:     " Soft, nontender, bowel sounds active, no masses, no organomegaly                  Extremities:   Extremities normal, atraumatic, no cyanosis or edema                        Pulses:   Pulses palpable in all extremities                            Skin:   Skin is warm and dry,  no rashes or palpable lesions                  Neurologic:   CNII-XII intact, motor strength grossly intact, sensation grossly intact to light touch, no focal deficits noted       Data Review:  Labs in chart were reviewed.  WBC   Date Value Ref Range Status   11/04/2021 7.03 3.40 - 10.80 10*3/mm3 Final     Hemoglobin   Date Value Ref Range Status   11/04/2021 13.2 13.0 - 17.7 g/dL Final     Hematocrit   Date Value Ref Range Status   11/04/2021 40.5 37.5 - 51.0 % Final     Platelets   Date Value Ref Range Status   11/04/2021 46 (C) 140 - 450 10*3/mm3 Final     Sodium   Date Value Ref Range Status   11/04/2021 138 136 - 145 mmol/L Final     Potassium   Date Value Ref Range Status   11/04/2021 3.6 3.5 - 5.2 mmol/L Final     Chloride   Date Value Ref Range Status   11/04/2021 98 98 - 107 mmol/L Final     CO2   Date Value Ref Range Status   11/04/2021 30.7 (H) 22.0 - 29.0 mmol/L Final     BUN   Date Value Ref Range Status   11/04/2021 19 6 - 20 mg/dL Final     Creatinine   Date Value Ref Range Status   11/04/2021 0.87 0.76 - 1.27 mg/dL Final     Glucose   Date Value Ref Range Status   11/04/2021 97 65 - 99 mg/dL Final     Calcium   Date Value Ref Range Status   11/04/2021 8.5 (L) 8.6 - 10.5 mg/dL Final     Magnesium   Date Value Ref Range Status   11/04/2021 1.8 1.6 - 2.6 mg/dL Final     Phosphorus   Date Value Ref Range Status   11/04/2021 2.7 2.5 - 4.5 mg/dL Final     AST (SGOT)   Date Value Ref Range Status   11/04/2021 245 (H) 1 - 40 U/L Final     ALT (SGPT)   Date Value Ref Range Status   11/04/2021 1,352 (H) 1 - 41 U/L Final     Alkaline Phosphatase   Date Value Ref Range Status   11/04/2021 105 39 - 117 U/L Final     Patient Active Problem  List   Diagnosis Code   • Atrial fibrillation with rapid ventricular response (HCC) I48.91   • KRISHNA (acute kidney injury) (HCC) N17.9   • Hyperkalemia E87.5   • Right upper quadrant abdominal pain R10.11   • Elevated LFTs R79.89   • Mitral insufficiency, acute I34.0   • Cardiogenic shock (HCC) R57.0   • Coagulopathy (HCC) D68.9   • Thrombocytopenia (HCC) D69.6   • Other specified anemias D64.89   • 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       Assessment:    Atrial fibrillation with rapid ventricular response (HCC)    KRISHNA (acute kidney injury) (HCC)    Hyperkalemia    Right upper quadrant abdominal pain    Elevated LFTs    Mitral insufficiency, acute    Cardiogenic shock (HCC)    Coagulopathy (HCC)    Thrombocytopenia (HCC)    Other specified anemias    Hypofibrinogenemia (HCC)      Plan:  Will assume care  He is hesitant about having the cardiac mri done  Monitor creatinine  Transaminases are trending down  Platelets are also up slightly  Notes and labs reviewed  Thanks for involving us in his care    Nirmala Mcgowan MD  11/4/2021  20:04 EDT

## 2021-11-05 NOTE — PROGRESS NOTES
"   LOS: 7 days    Patient Care Team:  Provider, No Known as PCP - General    Chief Complaint:    Chief Complaint   Patient presents with   • Abdominal Pain   • Nausea     Follow UP KRISHNA  Subjective     Interval History:   Feels better.  No soa no cp no nausea.  Review of Systems:   As noted above    Objective     Vital Signs  Temp:  [97.9 °F (36.6 °C)-98.5 °F (36.9 °C)] 98.5 °F (36.9 °C)  Heart Rate:  [] 108  Resp:  [18] 18  BP: (103-130)/(81-91) 113/91    Flowsheet Rows      First Filed Value   Admission Height 175.3 cm (69\") Documented at 10/29/2021 1717   Admission Weight 99.8 kg (220 lb) Documented at 10/29/2021 1433          No intake/output data recorded.  I/O last 3 completed shifts:  In: 960 [P.O.:860; I.V.:100]  Out: 3875 [Urine:3875]    Intake/Output Summary (Last 24 hours) at 11/5/2021 1617  Last data filed at 11/5/2021 0400  Gross per 24 hour   Intake 360 ml   Output 1425 ml   Net -1065 ml       Physical Exam:  General Appearance: alert, oriented x 3, no acute distress,   Skin: warm and dry  HEENT: pupils round and reactive to light, oral mucosa normal, nonicteric sclera  Neck: supple, no JVD, trachea midline  Lungs: CTA, unlabored breathing effort  Heart: RRR, normal S1 and S2, no S3, no rub  Abdomen: soft, nontender, normoactive bowels  : no palpable bladder,  Extremities: 2+ edema, cyanosis or clubbing  Neuro: normal speech and mental status       Results Review:    Results from last 7 days   Lab Units 11/05/21  1442 11/04/21  0758 11/03/21  0316   SODIUM mmol/L 137 138 143   POTASSIUM mmol/L 3.4* 3.6 3.8   CHLORIDE mmol/L 100 98 105   CO2 mmol/L 29.2* 30.7* 30.4*   BUN mg/dL 19 19 21*   CREATININE mg/dL 1.14 0.87 1.02   CALCIUM mg/dL 8.6 8.5* 8.1*   BILIRUBIN mg/dL 2.2* 2.9* 3.5*   ALK PHOS U/L 113 105 65   ALT (SGPT) U/L 925* 1,352* 1,781*   AST (SGOT) U/L 126* 245* 446*   GLUCOSE mg/dL 136* 97 94       Estimated Creatinine Clearance: 81.1 mL/min (by C-G formula based on SCr of 1.14 " mg/dL).    Results from last 7 days   Lab Units 11/05/21  1442 11/04/21  0758 11/03/21  1951 11/03/21  0316 11/03/21  0316   MAGNESIUM mg/dL 1.9 1.8  --   --  1.6   PHOSPHORUS mg/dL 2.4* 2.7 2.7   < > 2.3*    < > = values in this interval not displayed.       Results from last 7 days   Lab Units 10/30/21  0356   URIC ACID mg/dL 16.8*       Results from last 7 days   Lab Units 11/05/21  1442 11/04/21  0758 11/03/21  0932 11/03/21  0316 11/02/21  1806 11/02/21  0403 11/02/21  0403   WBC 10*3/mm3 6.92 7.03  --  6.19 6.86  --  7.01   HEMOGLOBIN g/dL 14.0 13.2  --  11.4* 12.3*  --  13.0   PLATELETS 10*3/mm3 81* 46* 36* 36* 38*   < > 56*    < > = values in this interval not displayed.       Results from last 7 days   Lab Units 11/05/21  1442 11/04/21  0758 11/03/21 0316 11/02/21  1806 11/02/21  0558   INR  1.15* 1.18* 1.27* 1.26* 1.29*         Imaging Results (Last 24 Hours)     Procedure Component Value Units Date/Time    MRI Cardiac Function Complete With & Without Morphology [[id number]] Resulted: 11/05/21 1549     Updated: 11/05/21 1601    Narrative:      Cardiac MRI  11/05/21    The pulmonic valve is normal in structure and function.  The tricuspid valve is normal in structure and function.  The mitral valve is normal but there is mitral regurgitation noted.  The aortic valve is normal in structure and function.    There is severe global hypokinesis of the left ventricle.  There is hypokinesis of the right ventricle.  There is moderate biatrial enlargement.  There is epicardial fat noted. There is no significant pericardial   effusion.   The aortic root is normal in size.     There is normal perfusion of the myocardium.    The coronary arteries are visualized and run in their expected course.    The right coronary artery comes off the right coronary cusp.  The left   main coronary artery comes off the left coronary cusp and bifurcates into   the left anterior descending artery and circumflex coronary artery.      There is no delayed enhancement.           Conclusion:  1. Severe global hypokinesis of the left ventricle.  2.  Right ventricle is hypokinetic.  3.  Moderate biatrial enlargement  4.  Mitral valve regurgitation is noted  5.  No evidence of delayed enhancement or infiltrative cardiomyopathy.  6.  Normal perfusion of the myocardium.  The MRI on call does not show any delayed enhancement.  I have called the   lab to see if they will expedite getting me the EF number so I can get the   report in the computer before I leave today but it does not sound   promising.  They are apparently pretty short staffed.    Lulu Bojorquez MD  11/05/21          chlorhexidine, 15 mL, Mouth/Throat, Q12H  digoxin, 125 mcg, Oral, Daily  [START ON 11/6/2021] fondaparinux, 7.5 mg, Subcutaneous, Q24H  losartan, 50 mg, Oral, Q24H  metoprolol succinate XL, 100 mg, Oral, Q12H  sodium chloride, 10 mL, Intravenous, Q12H           Medication Review:   Current Facility-Administered Medications   Medication Dose Route Frequency Provider Last Rate Last Admin   • atropine sulfate injection 0.5 mg  0.5 mg Intravenous Q5 Min PRN Kurt Evangelista MD       • atropine sulfate injection 0.5 mg  0.5 mg Intravenous Q5 Min PRN Kurt Evangelista MD       • calcium gluconate 1g/50ml 0.675% NaCl IV SOLN  1 g Intravenous PRN Kurt Evangelisat MD        And   • calcium gluconate 6 g in sodium chloride 0.9 % 500 mL IVPB  6 g Intravenous PRN Kurt Evangelista MD       • chlorhexidine (PERIDEX) 0.12 % solution 15 mL  15 mL Mouth/Throat Q12H Kurt Evangelista MD   15 mL at 11/04/21 2158   • digoxin (LANOXIN) tablet 125 mcg  125 mcg Oral Daily Kurt Evangelista MD   125 mcg at 11/05/21 1401   • fentaNYL citrate (PF) (SUBLIMAZE) injection 50 mcg  50 mcg Intravenous Q30 Min PRN Kurt Evangelista MD   50 mcg at 11/01/21 1242   • [START ON 11/6/2021] fondaparinux (ARIXTRA) injection 7.5 mg  7.5 mg Subcutaneous Q24H Denis Melgoza Jr., MD       • HYDROcodone-acetaminophen (NORCO)  5-325 MG per tablet 1 tablet  1 tablet Oral Q4H PRN Kurt Evangelista MD       • losartan (COZAAR) tablet 50 mg  50 mg Oral Q24H Kurt Evangelista MD   50 mg at 11/05/21 1402   • metoprolol succinate XL (TOPROL-XL) 24 hr tablet 100 mg  100 mg Oral Q12H Get Cunha MD   100 mg at 11/05/21 0909   • morphine injection 1 mg  1 mg Intravenous Q4H PRN Kurt Evangelista MD        And   • naloxone (NARCAN) injection 0.4 mg  0.4 mg Intravenous Q5 Min PRN Kurt Evangelista MD       • potassium & sodium phosphates (PHOS-NAK) 280-160-250 MG packet - for Phosphorus less than 1.25 mg/dL  2 packet Oral Q6H PRN Kurt Evangelista MD        Or   • potassium & sodium phosphates (PHOS-NAK) 280-160-250 MG packet - for Phosphorus 1.25 - 2.5 mg/dL  2 packet Oral Q6H PRN Kurt Evangelista MD   2 packet at 11/03/21 0615   • potassium chloride (K-DUR,KLOR-CON) ER tablet 40 mEq  40 mEq Oral PRN Kurt Evangelista MD        Or   • potassium chloride (KLOR-CON) packet 40 mEq  40 mEq Oral PRN Kurt Evangelista MD   40 mEq at 11/01/21 2224    Or   • potassium chloride 10 mEq in 100 mL IVPB  10 mEq Intravenous Q1H PRN Kurt Evangelista  mL/hr at 11/02/21 2007 10 mEq at 11/02/21 2007   • sodium chloride 0.9 % flush 10 mL  10 mL Intravenous PRN Kurt Evangelista MD       • sodium chloride 0.9 % flush 10 mL  10 mL Intravenous PRN Kurt Evangelista MD       • sodium chloride 0.9 % flush 10 mL  10 mL Intravenous Q12H Kurt Evangelista MD   10 mL at 11/05/21 0909   • sodium chloride 0.9 % infusion 250 mL  250 mL Intravenous Once PRN Kurt Evangelista MD           Assessment/Plan         Cardiogenic shock (HCC)    Atrial fibrillation with rapid ventricular response (HCC)    Right upper quadrant abdominal pain    Shock liver    Mitral insufficiency, acute    Coagulopathy (HCC)    Thrombocytopenia (HCC)    Other specified anemias    Hypofibrinogenemia (HCC)    Acute deep vein thrombosis (DVT) of right upper extremity (HCC)    DVT, lower extremity, proximal, acute,  bilateral (HCC)    Pseudoaneurysm of femoral artery following procedure (HCC)       ASSESSMENT:  1. KRISHNA.  Excellent response to oral torsemide.  Volume by exam better. Suspect some of the edema is due to bilateral lower ext DVT.    2. Nonischemic cardiogenic shock . Off Impella. Biventricular failure. LV and RV improved on repeat echo 11/2.  Acute respiratory failure resolved off vent.    3. Shock liver.  Slowly improving.   4. TCP, coagulopathy with correction on mixing study. Dr. Melgoza evaluating.  Hypercoag state with multiple DVT  RIJ, RUE, bilateral lower ext DVT   5. Atrial fibrillation. SP cardioversion x 2 without success.  Digoxin loaded 11/1.  Oral metoprolol today per cardiology.  Rate overall improved.      PLAN:  1. Bmp in am  2. Replace potassium  3. Lasix 20mg po bid    Lulu Rico MD  11/05/21  16:17 EDT

## 2021-11-06 ENCOUNTER — APPOINTMENT (OUTPATIENT)
Dept: CARDIOLOGY | Facility: HOSPITAL | Age: 56
End: 2021-11-06

## 2021-11-06 LAB
ALBUMIN SERPL-MCNC: 3.1 G/DL (ref 3.5–5.2)
ALBUMIN/GLOB SERPL: 1.4 G/DL
ALP SERPL-CCNC: 84 U/L (ref 39–117)
ALT SERPL W P-5'-P-CCNC: 691 U/L (ref 1–41)
ANION GAP SERPL CALCULATED.3IONS-SCNC: 8.8 MMOL/L (ref 5–15)
APTT PPP: 28.8 SECONDS (ref 22.7–35.4)
AST SERPL-CCNC: 84 U/L (ref 1–40)
BASOPHILS # BLD AUTO: 0.03 10*3/MM3 (ref 0–0.2)
BASOPHILS NFR BLD AUTO: 0.4 % (ref 0–1.5)
BH CV GROIN LEFT HEMATOMA LONG LENGTH: 1.15 CM
BH CV LEFT GROIN HEMATOMA LONG WIDTH: 0.64 CM
BH CV LEFT GROIN HEMATOMA TRANS WIDTH: 0.6 CM
BH CV LEFT GROIN PSA PROCEDURE SCRIPTING LRR: 1
BH CV LEFT HEMATOMA TRANS LENGTH: 0.5 CM
BH CV RIGHT GROIN PSA PROCEDURE SCRIPTING LRR: 1
BH CV VAS L EIA VELOCITY PSV: 59.1 CM/SEC
BH CV XLRA MEAS EXT ILIAC A PSV RIGHT: 71.1 CM/SEC
BILIRUB SERPL-MCNC: 2.2 MG/DL (ref 0–1.2)
BUN SERPL-MCNC: 16 MG/DL (ref 6–20)
BUN/CREAT SERPL: 17.4 (ref 7–25)
CALCIUM SPEC-SCNC: 8.3 MG/DL (ref 8.6–10.5)
CHLORIDE SERPL-SCNC: 105 MMOL/L (ref 98–107)
CO2 SERPL-SCNC: 28.2 MMOL/L (ref 22–29)
CREAT SERPL-MCNC: 0.92 MG/DL (ref 0.76–1.27)
DEPRECATED RDW RBC AUTO: 46 FL (ref 37–54)
EOSINOPHIL # BLD AUTO: 0.25 10*3/MM3 (ref 0–0.4)
EOSINOPHIL NFR BLD AUTO: 3.7 % (ref 0.3–6.2)
ERYTHROCYTE [DISTWIDTH] IN BLOOD BY AUTOMATED COUNT: 14 % (ref 12.3–15.4)
FIBRINOGEN PPP-MCNC: 280 MG/DL (ref 219–464)
GFR SERPL CREATININE-BSD FRML MDRD: 85 ML/MIN/1.73
GLOBULIN UR ELPH-MCNC: 2.2 GM/DL
GLUCOSE SERPL-MCNC: 88 MG/DL (ref 65–99)
HCT VFR BLD AUTO: 37.5 % (ref 37.5–51)
HGB BLD-MCNC: 12.7 G/DL (ref 13–17.7)
INR PPP: 1.18 (ref 0.9–1.1)
LEFT GROIN CFA SYS: 66.6 CM/SEC
LYMPHOCYTES # BLD AUTO: 1.14 10*3/MM3 (ref 0.7–3.1)
LYMPHOCYTES NFR BLD AUTO: 17 % (ref 19.6–45.3)
MAGNESIUM SERPL-MCNC: 1.8 MG/DL (ref 1.6–2.6)
MCH RBC QN AUTO: 31.1 PG (ref 26.6–33)
MCHC RBC AUTO-ENTMCNC: 33.9 G/DL (ref 31.5–35.7)
MCV RBC AUTO: 91.7 FL (ref 79–97)
MONOCYTES # BLD AUTO: 1 10*3/MM3 (ref 0.1–0.9)
MONOCYTES NFR BLD AUTO: 14.9 % (ref 5–12)
NEUTROPHILS NFR BLD AUTO: 4.24 10*3/MM3 (ref 1.7–7)
NEUTROPHILS NFR BLD AUTO: 63.3 % (ref 42.7–76)
PLATELET # BLD AUTO: 89 10*3/MM3 (ref 140–450)
PMV BLD AUTO: 11.4 FL (ref 6–12)
POTASSIUM SERPL-SCNC: 3.6 MMOL/L (ref 3.5–5.2)
PROT SERPL-MCNC: 5.3 G/DL (ref 6–8.5)
PROTHROMBIN TIME: 14.8 SECONDS (ref 11.7–14.2)
PROX PFA PSV LEFT: 35.9 CM/SEC
PROX PFA PSV RIGHT: 42.9 CM/SEC
PROX SFA PSV LEFT: 77.6 CM/SEC
PROX SFA PSV RIGHT: 57.5 CM/SEC
RBC # BLD AUTO: 4.09 10*6/MM3 (ref 4.14–5.8)
RIGHT GROIN CFA SYS: 64 CM/SEC
SODIUM SERPL-SCNC: 142 MMOL/L (ref 136–145)
WBC # BLD AUTO: 6.71 10*3/MM3 (ref 3.4–10.8)

## 2021-11-06 PROCEDURE — 36415 COLL VENOUS BLD VENIPUNCTURE: CPT | Performed by: INTERNAL MEDICINE

## 2021-11-06 PROCEDURE — 97530 THERAPEUTIC ACTIVITIES: CPT | Performed by: PHYSICAL THERAPIST

## 2021-11-06 PROCEDURE — 83735 ASSAY OF MAGNESIUM: CPT | Performed by: INTERNAL MEDICINE

## 2021-11-06 PROCEDURE — 85384 FIBRINOGEN ACTIVITY: CPT | Performed by: INTERNAL MEDICINE

## 2021-11-06 PROCEDURE — 25010000002 FONDAPARINUX PER 0.5 MG: Performed by: INTERNAL MEDICINE

## 2021-11-06 PROCEDURE — 85730 THROMBOPLASTIN TIME PARTIAL: CPT | Performed by: INTERNAL MEDICINE

## 2021-11-06 PROCEDURE — 85610 PROTHROMBIN TIME: CPT | Performed by: INTERNAL MEDICINE

## 2021-11-06 PROCEDURE — 80053 COMPREHEN METABOLIC PANEL: CPT | Performed by: INTERNAL MEDICINE

## 2021-11-06 PROCEDURE — 99232 SBSQ HOSP IP/OBS MODERATE 35: CPT | Performed by: INTERNAL MEDICINE

## 2021-11-06 PROCEDURE — 93925 LOWER EXTREMITY STUDY: CPT

## 2021-11-06 PROCEDURE — 85025 COMPLETE CBC W/AUTO DIFF WBC: CPT | Performed by: INTERNAL MEDICINE

## 2021-11-06 RX ORDER — DILTIAZEM HYDROCHLORIDE 60 MG/1
60 TABLET, FILM COATED ORAL EVERY 8 HOURS SCHEDULED
Status: DISCONTINUED | OUTPATIENT
Start: 2021-11-06 | End: 2021-11-07

## 2021-11-06 RX ADMIN — SODIUM CHLORIDE, PRESERVATIVE FREE 10 ML: 5 INJECTION INTRAVENOUS at 22:24

## 2021-11-06 RX ADMIN — FUROSEMIDE 20 MG: 20 TABLET ORAL at 18:18

## 2021-11-06 RX ADMIN — CHLORHEXIDINE GLUCONATE 15 ML: 1.2 RINSE ORAL at 22:23

## 2021-11-06 RX ADMIN — METOPROLOL SUCCINATE 100 MG: 100 TABLET, EXTENDED RELEASE ORAL at 08:10

## 2021-11-06 RX ADMIN — SODIUM CHLORIDE, PRESERVATIVE FREE 10 ML: 5 INJECTION INTRAVENOUS at 08:11

## 2021-11-06 RX ADMIN — CHLORHEXIDINE GLUCONATE 15 ML: 1.2 RINSE ORAL at 08:10

## 2021-11-06 RX ADMIN — DILTIAZEM HYDROCHLORIDE 60 MG: 60 TABLET, FILM COATED ORAL at 22:23

## 2021-11-06 RX ADMIN — FONDAPARINUX SODIUM 7.5 MG: 7.5 INJECTION, SOLUTION SUBCUTANEOUS at 08:11

## 2021-11-06 RX ADMIN — FUROSEMIDE 20 MG: 20 TABLET ORAL at 08:10

## 2021-11-06 RX ADMIN — DILTIAZEM HYDROCHLORIDE 60 MG: 60 TABLET, FILM COATED ORAL at 16:43

## 2021-11-06 RX ADMIN — POTASSIUM CHLORIDE 20 MEQ: 750 TABLET, EXTENDED RELEASE ORAL at 08:10

## 2021-11-06 RX ADMIN — DILTIAZEM HYDROCHLORIDE 60 MG: 60 TABLET, FILM COATED ORAL at 10:40

## 2021-11-06 RX ADMIN — METOPROLOL SUCCINATE 100 MG: 100 TABLET, EXTENDED RELEASE ORAL at 22:23

## 2021-11-06 RX ADMIN — DIGOXIN 125 MCG: 125 TABLET ORAL at 12:25

## 2021-11-06 RX ADMIN — LOSARTAN POTASSIUM 50 MG: 50 TABLET, FILM COATED ORAL at 12:25

## 2021-11-06 NOTE — PROGRESS NOTES
LOS: 8 days   Patient Care Team:  Provider, No Known as PCP - General    Chief Complaint:   Cardiogenic shock, atrial fibrillation    Interval History:   No acute interval events.  His heart rate is being a little resistant.  Rates are remaining around 110 to 120 bpm even when sleeping.  I worry as he improves and becomes more active he will have issues with RVR.     Objective   Vital Signs  Temp:  [97.9 °F (36.6 °C)-98.8 °F (37.1 °C)] 98.2 °F (36.8 °C)  Heart Rate:  [] 95  Resp:  [18] 18  BP: ()/(75-91) 108/88  No intake or output data in the 24 hours ending 11/06/21 0555    Review of Systems   Constitutional: Negative.   Cardiovascular: Negative.    Respiratory: Negative.    Gastrointestinal: Negative.        Physical Exam  Vitals and nursing note reviewed.   Constitutional:       Appearance: Normal appearance.   HENT:      Head: Normocephalic.   Cardiovascular:      Rate and Rhythm: Tachycardia present. Rhythm irregular.   Neurological:      General: No focal deficit present.      Mental Status: He is alert and oriented to person, place, and time.   Psychiatric:         Mood and Affect: Mood normal.         Behavior: Behavior normal.           Results Review:      Results from last 7 days   Lab Units 11/05/21  1442 11/04/21 0758 11/04/21 0758 11/03/21 0316 11/03/21 0316   SODIUM mmol/L 137  --  138  --  143   POTASSIUM mmol/L 3.4*  --  3.6  --  3.8   CHLORIDE mmol/L 100  --  98  --  105   CO2 mmol/L 29.2*  --  30.7*  --  30.4*   BUN mg/dL 19  --  19  --  21*   CREATININE mg/dL 1.14  --  0.87  --  1.02   GLUCOSE mg/dL 136*   < > 97   < > 94   CALCIUM mg/dL 8.6  --  8.5*  --  8.1*    < > = values in this interval not displayed.     Results from last 7 days   Lab Units 10/31/21  1527 10/31/21  0825 10/30/21  2227   CK TOTAL U/L 339* 347* 351*     Results from last 7 days   Lab Units 11/05/21  1442 11/04/21  0758 11/03/21  0932 11/03/21 0316 11/03/21  0316   WBC 10*3/mm3 6.92 7.03  --   --  6.19    HEMOGLOBIN g/dL 14.0 13.2  --   --  11.4*   HEMATOCRIT % 40.8 40.5  --   --  35.8*   PLATELETS 10*3/mm3 81* 46* 36*   < > 36*    < > = values in this interval not displayed.     Results from last 7 days   Lab Units 11/05/21  1442 11/04/21  0758 11/03/21  0316   INR  1.15* 1.18* 1.27*   APTT seconds 30.8 27.4 30.1         Results from last 7 days   Lab Units 11/05/21  1442   MAGNESIUM mg/dL 1.9     Results from last 7 days   Lab Units 11/02/21  0403   TRIGLYCERIDES mg/dL 102       I reviewed the patient's new clinical results.  I personally viewed and interpreted the patient's EKG/Telemetry data        Medication Review:   chlorhexidine, 15 mL, Mouth/Throat, Q12H  digoxin, 125 mcg, Oral, Daily  dilTIAZem, 60 mg, Oral, Q8H  fondaparinux, 7.5 mg, Subcutaneous, Q24H  furosemide, 20 mg, Oral, BID  losartan, 50 mg, Oral, Q24H  metoprolol succinate XL, 100 mg, Oral, Q12H  potassium chloride, 20 mEq, Oral, Daily  sodium chloride, 10 mL, Intravenous, Q12H             Assessment/Plan       Cardiogenic shock (HCC)    Atrial fibrillation with rapid ventricular response (HCC)    Right upper quadrant abdominal pain    Shock liver    Mitral insufficiency, acute    Coagulopathy (HCC)    Thrombocytopenia (HCC)    Other specified anemias    Hypofibrinogenemia (HCC)    Acute deep vein thrombosis (DVT) of right upper extremity (HCC)    DVT, lower extremity, proximal, acute, bilateral (HCC)    Pseudoaneurysm of femoral artery following procedure (HCC)    I was trying to not use a CCB, but I don't think we are going to be able to control his rates without it.   Will start oral dilitazem.  I think it is low risk given recovery of ejection fraction and hemodynamic stability.       He is on anticoagulation with fondaparinux due to concern about HIT, hematology following.     He has a right upper extremity DVT as well.     Pseudoaneurysm at right femoral access site.     Jose Salcedo MD  11/06/21  05:55 EDT

## 2021-11-06 NOTE — PROGRESS NOTES
Name: Denis Chavez ADMIT: 10/29/2021   : 1965  PCP: Provider, No Known    MRN: [id number] LOS: 8 days   AGE/SEX: 56 y.o. male  ROOM: /     Subjective   Subjective   Reports overall feeling continued improvement.      Review of Systems     Objective   Objective   Vital Signs  Temp:  [98.1 °F (36.7 °C)-98.8 °F (37.1 °C)] 98.1 °F (36.7 °C)  Heart Rate:  [] 120  Resp:  [18] 18  BP: ()/(75-91) 106/79  SpO2:  [95 %-97 %] 97 %  on   ;   Device (Oxygen Therapy): room air  Body mass index is 29.88 kg/m².  Physical Exam  Vitals and nursing note reviewed.   Constitutional:       General: He is not in acute distress.     Appearance: Normal appearance.   Neck:      Vascular: No JVD.      Trachea: No tracheal deviation.   Cardiovascular:      Rate and Rhythm: Normal rate and regular rhythm.      Heart sounds: Normal heart sounds.   Pulmonary:      Effort: Pulmonary effort is normal. No respiratory distress.      Breath sounds: Normal breath sounds.   Abdominal:      General: Bowel sounds are normal.      Palpations: Abdomen is soft.      Tenderness: There is no abdominal tenderness.   Skin:     General: Skin is warm and dry.   Neurological:      General: No focal deficit present.      Mental Status: He is alert and oriented to person, place, and time.   Psychiatric:         Behavior: Behavior normal. Behavior is cooperative.         Results Review     I reviewed the patient's new clinical results.  Results from last 7 days   Lab Units 21  0932 216 216   WBC 10*3/mm3 6.71 6.92 7.03  --   --  6.19   HEMOGLOBIN g/dL 12.7* 14.0 13.2  --   --  11.4*   PLATELETS 10*3/mm3 89* 81* 46* 36*   < > 36*    < > = values in this interval not displayed.     Results from last 7 days   Lab Units 21  0516 21  1442 21   SODIUM mmol/L 142 137 138 143   POTASSIUM mmol/L 3.6 3.4* 3.6 3.8   CHLORIDE mmol/L 105 100 98  105   CO2 mmol/L 28.2 29.2* 30.7* 30.4*   BUN mg/dL 16 19 19 21*   CREATININE mg/dL 0.92 1.14 0.87 1.02   GLUCOSE mg/dL 88 136* 97 94   EGFR IF NONAFRICN AM mL/min/1.73 85 66 90 75     Results from last 7 days   Lab Units 11/06/21  0516 11/05/21  1442 11/04/21  0758 11/03/21  0932 11/03/21  0316   ALBUMIN g/dL 3.10* 3.40* 3.30* 2.8* 2.90*   BILIRUBIN mg/dL 2.2* 2.2* 2.9*  --  3.5*   ALK PHOS U/L 84 113 105  --  65   AST (SGOT) U/L 84* 126* 245*  --  446*   ALT (SGPT) U/L 691* 925* 1,352*  --  1,781*     Results from last 7 days   Lab Units 11/06/21  0516 11/05/21  1442 11/04/21  0758 11/03/21 1951 11/03/21  0932 11/03/21  0316   CALCIUM mg/dL 8.3* 8.6 8.5*  --   --  8.1*   ALBUMIN g/dL 3.10* 3.40* 3.30*  --  2.8* 2.90*   MAGNESIUM mg/dL 1.8 1.9 1.8  --   --  1.6   PHOSPHORUS mg/dL  --  2.4* 2.7 2.7  --  2.3*     Results from last 7 days   Lab Units 11/03/21  0316 11/03/21  0026 11/02/21  1806 11/02/21  0924   LACTATE mmol/L 1.1 1.2 1.4 1.3     COVID19   Date Value Ref Range Status   10/29/2021 Not Detected Not Detected - Ref. Range Final     Glucose   Date/Time Value Ref Range Status   11/04/2021 1939 159 (H) 70 - 130 mg/dL Final     Comment:     Meter: [id number] : [id number] Fabián STEVENSON       MRI Cardiac Function Complete With & Without Morphology  Cardiac MRI  11/05/21    The pulmonic valve is normal in structure and function.  The tricuspid valve is normal in structure and function.  The mitral valve is normal but there is mitral regurgitation noted.  The aortic valve is normal in structure and function.    There is severe global hypokinesis of the left ventricle.  There is hypokinesis of the right ventricle.  There is moderate biatrial enlargement.  There is epicardial fat noted. There is no significant pericardial   effusion.   The aortic root is normal in size.     There is normal perfusion of the myocardium.    The coronary arteries are visualized and run in their expected course.    The right  coronary artery comes off the right coronary cusp.  The left   main coronary artery comes off the left coronary cusp and bifurcates into   the left anterior descending artery and circumflex coronary artery.     There is no delayed enhancement.         Conclusion:  1. Severe global hypokinesis of the left ventricle.  2.  Right ventricle is hypokinetic.  3.  Moderate biatrial enlargement  4.  Mitral valve regurgitation is noted  5.  No evidence of delayed enhancement or infiltrative cardiomyopathy.  6.  Normal perfusion of the myocardium.  The MRI on call does not show any delayed enhancement.  I have called the   lab to see if they will expedite getting me the EF number so I can get the   report in the computer before I leave today but it does not sound   promising.  They are apparently pretty short staffed.    Lulu Bojorquez MD  11/05/21    Scheduled Medications  chlorhexidine, 15 mL, Mouth/Throat, Q12H  digoxin, 125 mcg, Oral, Daily  dilTIAZem, 60 mg, Oral, Q8H  [START ON 11/7/2021] enoxaparin, 1 mg/kg, Subcutaneous, Q12H  furosemide, 20 mg, Oral, BID  losartan, 50 mg, Oral, Q24H  metoprolol succinate XL, 100 mg, Oral, Q12H  potassium chloride, 20 mEq, Oral, Daily  sodium chloride, 10 mL, Intravenous, Q12H    Infusions   Diet  Diet Regular; Cardiac, Consistent Carbohydrate       Assessment/Plan     Active Hospital Problems    Diagnosis  POA   • **Cardiogenic shock ([facility]) [R57.0]  Yes   • Acute deep vein thrombosis (DVT) of right upper extremity ([facility]) [I82.621]  Unknown   • DVT, lower extremity, proximal, acute, bilateral ([facility]) [I82.4Y3]  Unknown   • Pseudoaneurysm of femoral artery following procedure ([facility]) [T81.718A, I72.4]  Unknown   • Other specified anemias [D64.89]  Unknown   • Hypofibrinogenemia ([facility]) [D68.8]  Yes   • Coagulopathy ([facility]) [D68.9]  Yes   • Thrombocytopenia ([facility]) [D69.6]  Yes   • Atrial fibrillation with rapid ventricular response ([facility]) [I48.91]  Yes   • Right upper quadrant abdominal pain  [R10.11]  Unknown   • Shock liver [K72.00]  Yes   • Mitral insufficiency, acute [I34.0]  Yes      Resolved Hospital Problems    Diagnosis Date Resolved POA   • KRISHNA (acute kidney injury) (HCC) [N17.9] 11/05/2021 Yes   • Hyperkalemia [E87.5] 11/05/2021 Yes       56 y.o. male admitted with Cardiogenic shock (HCC).    Labs overall improving.  LFTs, creatinine and platelets better today.  Hematology planning transition from Arixtra to full dose Lovenox.  Continue monitoring labs.  Cardiology adjusting medications.  Cardiac MRI done, defer to cardiology discussion of this with patient.    Vascular planning Doppler study in morning to evaluate right femoral pseudoaneurysm following calf.      · Pharmacy to dose Lovenox for DVT prophylaxis/treatment.  · Full code.  · Discussed with patient and nursing staff.  · Anticipate discharge home with home health when cleared by consultants.      Edy Khan MD  [facility]  11/06/21  13:36 EDT

## 2021-11-06 NOTE — PROGRESS NOTES
[facility] INPATIENT PROGRESS NOTE    Length of Stay:  8 days    CHIEF COMPLAINT: Cardiogenic shock with multiorgan failure, coagulopathy, thrombocytopenia, right upper extremity/IJ DVT      SUBJECTIVE: Patient has no complaints today.  He is up and ambulatory with physical therapy without any difficulties.  He denies any bleeding issues.      ROS:  Review of Systems a comprehensive review of systems was obtained with pertinent positive findings as noted in the interval history above.  All other systems negative.    OBJECTIVE:  Vitals:    11/05/21 2000 11/05/21 2315 11/06/21 0622 11/06/21 0741   BP:  108/88  106/79   BP Location:  Left arm  Left arm   Patient Position:  Lying  Lying   Pulse:  95  120   Resp:  18  18   Temp:  98.2 °F (36.8 °C)  98.1 °F (36.7 °C)   TempSrc:  Oral  Oral   SpO2: 97% 95%  97%   Weight:   91.8 kg (202 lb 6.4 oz)    Height:             PHYSICAL EXAMINATION:  General: Alert and oriented x3 no distress  Neck: Right IJ central catheter has been removed  Chest/Lungs: Clear to auscultation bilaterally  Heart: Irregular, no murmurs gallops or rubs  Abdomen/GI: Soft nontender nondistended bowel sounds present  Extremities: No significant edema right upper extremity.  Trace edema bilateral lower extremities    Patient was examined today, unchanged from above    DIAGNOSTIC DATA:  Results Review:     I reviewed the patient's new clinical results.    Results from last 7 days   Lab Units 11/06/21 0516 11/05/21 1442 11/04/21  0758   WBC 10*3/mm3 6.71 6.92 7.03   HEMOGLOBIN g/dL 12.7* 14.0 13.2   HEMATOCRIT % 37.5 40.8 40.5   PLATELETS 10*3/mm3 89* 81* 46*      Results from last 7 days   Lab Units 11/06/21 0516 11/05/21  1442 11/04/21  0758   SODIUM mmol/L 142 137 138   POTASSIUM mmol/L 3.6 3.4* 3.6   CHLORIDE mmol/L 105 100 98   CO2 mmol/L 28.2 29.2* 30.7*   BUN mg/dL 16 19 19   CREATININE mg/dL 0.92 1.14 0.87   CALCIUM mg/dL 8.3* 8.6 8.5*   BILIRUBIN mg/dL 2.2* 2.2* 2.9*   ALK PHOS  U/L 84 113 105   ALT (SGPT) U/L 691* 925* 1,352*   AST (SGOT) U/L 84* 126* 245*   GLUCOSE mg/dL 88 136* 97      Lab Results   Component Value Date    NEUTROABS 4.24 11/06/2021     Results from last 7 days   Lab Units 11/06/21  0516 11/05/21  1442 11/04/21  0758   INR  1.18* 1.15* 1.18*   APTT seconds 28.8 30.8 27.4     Results from last 7 days   Lab Units 11/06/21  0516   MAGNESIUM mg/dL 1.8       Assessment/Plan   ASSESSMENT/PLAN:  This is a 56 y.o. male with:     Cardiogenic shock, atrial fibrillation  · Biventricular failure, etiology unclear  · Patient requiring pressor support  · Patient had transthoracic echocardiogram, and cardiac catheterization on 10/29/2021.  · Status post cardioversion x2, persistent atrial fibrillation  · Impella device placed, initiated heparin  · Patient had POOJA on 10/30/2021 with ejection fraction 26-30%, severe dilation right ventricular cavity, moderate mitral regurgitation, Impella device in left ventricle.  · Investigation regarding potential cardiac transplant per cardiology, does not appear feasible due to lack of social support.  · Patient currently off of pressors, continuing on milrinone  · Bedside echocardiogram 11/2/2021 with ejection fraction 50%, low normal, normal right ventricular systolic function.  · Impella device removed on 11/2/2021, flat rate heparin discontinued as well  · Cardiac MRI on 11/5/2021 with global hypokinesis left ventricle, hypokinesis right ventricle, moderate biatrial enlargement, no evidence of infiltrative cardiomyopathy.  · Per Dr. Ma, patient will require ongoing anticoagulation due to atrial fibrillation.     *Acute hypoxic respiratory failure    · Patient previously required ventilatory support  · Patient extubated on 11/2/2021  · Complete resolution of pulmonary symptoms     *Shock liver  · Severe hepatic dysfunction secondary to cardiogenic shock  · Transaminases gradually trending down with labs today showing , AST 84, total  bilirubin 2.2.    *KRISHNA  · Secondary to cardiogenic shock  · Nephrology following  · Creatinine has continued to improve, 0.92 today     *Coagulopathy with hypofibrinogenemia/DIC  · Likely secondary to shock liver and reduced synthetic function in addition to possible effects from Impella device producing consumption  · Heparin drip initiated with placement of Impella device  · Patient has received FFP, cryoprecipitate, vitamin K  · PT mixing study 10/31/2021 with decreased from 22.3 down to 14.9 (near correction indicating likely a factor deficiency)  · Reluctant to administer further FFP/cryoprecipitate in the setting of active thrombosis (see below).  · Subsequent improvement in coagulation parameters spontaneously  · Labs today with INR INR 1.18, PTT 28.8, fibrinogen 280.  Platelet count further improved at 89,000 today.  We will discontinue routine coag monitoring for now with stable values.    *Right upper extremity/IJ catheter associated/IJ DVT 10/31/2021 with subsequent acute right upper extremity IJ and subclavian, subacute right brachial DVT, acute bilateral upper extremity superficial thrombophlebitis, acute right femoral/calf DVT, acute left femoral DVT 11/3/2021  · Doppler on 10/31/2021 with acute right upper extremity catheter associated IJ thrombus, acute left upper extremity superficial thrombophlebitis (cephalic).  · Patient was previously receiving fixed rate heparin with Impella device (heparin initiated 10/30/2021) until Impella device was removed on 11/2/2021  · Orders placed for initiation of full dose heparin on 11/2/2021 however heparin was not initiated due to platelet count of 38,000.  · Dopplers on 11/3/2021 showed acute right upper extremity IJ and subclavian, subacute right brachial DVT, acute bilateral upper extremity superficial thrombophlebitis, acute right femoral/calf DVT, acute left femoral DVT  · Incidental finding on Dopplers of right radial artery occlusion (secondary to radial  artery catheterization) and small left femoral artery pseudoaneurysm.  Vascular surgery consulted, perfusion normal right hand, no intervention needed.  Plan duplex right femoral artery to reevaluate pseudoaneurysm on 11/6/2021.  · With concern for heparin-induced thrombocytopenia and extensive thrombosis, initiated anticoagulation on 11/4/2021 with Arixtra 2.5 mg daily in the setting of platelet count 46,000.  Plan to gradually increase Arixtra dose as platelet count improves.  · On 11/5/2021, platelet count up to 81,000.  Heparin antiplatelet antibody and serotonin release were still pending.  With improvement in the platelet count we will go ahead increased Arixtra to 7.5 mg daily.  · Labs from 11/3/2021 returned with negative heparin antiplatelet antibody (KASEY 0.129), serotonin release assay negative.  · With negative heparin antibody, will change from Arixtra to Lovenox 1 mg/kg (90 mg) every 12 hours beginning 11/6/2021.  Plan to eventually transition to NOAC for ongoing anticoagulation.    *Thrombocytopenia.   · No prior labs available  · On admission 10/29/2021 platelets 131,000.   · Platelets trended down, 71,000 on 10/30/2021  · Labs on 10/31/2021 with negative heparin antiplatelet antibody with KASEY 0.121 OD.  · CT abdomen and pelvis 10/29/2021 with normal spleen.  · Thrombocytopenia felt to be related to consumption initially due to shock/DIC with exacerbation by Impella device  · Patient received platelet transfusion 2 units around time of placement of Impella device  · On 10/31/2021 platelet count 44,000, received 1 additional unit platelets with increase to 79,000  · Impella device was removed on 11/2/2021  · Platelet count declined further down to 38,000 on 11/2/2021 and 36,000 on 11/3/2021. With resolution of DIC by coag parameters and removal of Impella device, unclear as to the etiology of the worsening thrombocytopenia.   · On 11/3/2021, sent additional evaluation with evaluation today with B12  greater than 2000, folate 17.1, IPF 7.7% (elevated).  Serum protein electrophoresis and immunoelectrophoresis negative with normal quantitative immunoglobulin, free light chains unremarkable.  Negative heparin antiplatelet antibody (KASEY 0.129), serotonin release assay negative.  · Platelet count improved today at 89,000.  Suspect that thrombocytopenia was purely consumptive in nature related to Impella device previously and subsequently to diffuse thromboses.     *Erythrocytosis  · Hematocrit on admission was 53.3 10/29/2021  · Hematocrit has gradually declined since admission into the 12-13 range  · Current hematocrit 37.5    *Possible pneumonia/sepsis  · Patient received empiric Zosyn, completed course.     PLAN:  1. Discontinue Arixtra after this morning's dose  2. On 11/6/2021 begin Lovenox 1 mg/kg every 12 hours (90 mg).  3. Anticipate eventual transition from Lovenox to NOAC  4. Daily CBC, CMP    Discussed with patient at bedside           Denis Melgoza MD

## 2021-11-06 NOTE — PROGRESS NOTES
"   LOS: 8 days    Patient Care Team:  Provider, No Known as PCP - General    Chief Complaint:    Chief Complaint   Patient presents with   • Abdominal Pain   • Nausea     Follow UP KRISHNA  Subjective     Interval History:     Patient resting comfortably.  Denies any chest pain, shortness of breath, nausea or vomiting    Objective     Vital Signs  Temp:  [98.1 °F (36.7 °C)-98.8 °F (37.1 °C)] 98.1 °F (36.7 °C)  Heart Rate:  [] 120  Resp:  [18] 18  BP: ()/(75-91) 106/79    Flowsheet Rows      First Filed Value   Admission Height 175.3 cm (69\") Documented at 10/29/2021 1717   Admission Weight 99.8 kg (220 lb) Documented at 10/29/2021 1433          No intake/output data recorded.  I/O last 3 completed shifts:  In: 360 [P.O.:360]  Out: 925 [Urine:925]  No intake or output data in the 24 hours ending 11/06/21 0930    Physical Exam:  General Appearance: alert, oriented x 3, no acute distress,   Skin: warm and dry  HEENT: pupils round and reactive to light, oral mucosa normal, nonicteric sclera  Neck: supple, no JVD, trachea midline  Lungs: CTA, unlabored breathing effort  Heart: RRR, normal S1 and S2, no S3, no rub  Abdomen: soft, nontender, normoactive bowels  : no palpable bladder,  Extremities: trace edema, cyanosis or clubbing  Neuro: normal speech and mental status       Results Review:    Results from last 7 days   Lab Units 11/06/21  0516 11/05/21  1442 11/04/21  0758   SODIUM mmol/L 142 137 138   POTASSIUM mmol/L 3.6 3.4* 3.6   CHLORIDE mmol/L 105 100 98   CO2 mmol/L 28.2 29.2* 30.7*   BUN mg/dL 16 19 19   CREATININE mg/dL 0.92 1.14 0.87   CALCIUM mg/dL 8.3* 8.6 8.5*   BILIRUBIN mg/dL 2.2* 2.2* 2.9*   ALK PHOS U/L 84 113 105   ALT (SGPT) U/L 691* 925* 1,352*   AST (SGOT) U/L 84* 126* 245*   GLUCOSE mg/dL 88 136* 97       Estimated Creatinine Clearance: 99.1 mL/min (by C-G formula based on SCr of 0.92 mg/dL).    Results from last 7 days   Lab Units 11/06/21  0516 11/05/21  1442 11/04/21  0758 " 11/03/21 1951 11/03/21 0316   MAGNESIUM mg/dL 1.8 1.9 1.8  --    < >   PHOSPHORUS mg/dL  --  2.4* 2.7 2.7  --     < > = values in this interval not displayed.             Results from last 7 days   Lab Units 11/06/21  0516 11/05/21  1442 11/04/21  0758 11/03/21  0932 11/03/21 0316 11/02/21 1806 11/02/21 1806   WBC 10*3/mm3 6.71 6.92 7.03  --  6.19  --  6.86   HEMOGLOBIN g/dL 12.7* 14.0 13.2  --  11.4*  --  12.3*   PLATELETS 10*3/mm3 89* 81* 46* 36* 36*   < > 38*    < > = values in this interval not displayed.       Results from last 7 days   Lab Units 11/06/21 0516 11/05/21 1442 11/04/21 0758 11/03/21 0316 11/02/21 1806   INR  1.18* 1.15* 1.18* 1.27* 1.26*         Imaging Results (Last 24 Hours)     Procedure Component Value Units Date/Time    MRI Cardiac Function Complete With & Without Morphology [[id number]] Resulted: 11/05/21 1549     Updated: 11/05/21 1601    Narrative:      Cardiac MRI  11/05/21    The pulmonic valve is normal in structure and function.  The tricuspid valve is normal in structure and function.  The mitral valve is normal but there is mitral regurgitation noted.  The aortic valve is normal in structure and function.    There is severe global hypokinesis of the left ventricle.  There is hypokinesis of the right ventricle.  There is moderate biatrial enlargement.  There is epicardial fat noted. There is no significant pericardial   effusion.   The aortic root is normal in size.     There is normal perfusion of the myocardium.    The coronary arteries are visualized and run in their expected course.    The right coronary artery comes off the right coronary cusp.  The left   main coronary artery comes off the left coronary cusp and bifurcates into   the left anterior descending artery and circumflex coronary artery.     There is no delayed enhancement.           Conclusion:  1. Severe global hypokinesis of the left ventricle.  2.  Right ventricle is hypokinetic.  3.  Moderate biatrial  enlargement  4.  Mitral valve regurgitation is noted  5.  No evidence of delayed enhancement or infiltrative cardiomyopathy.  6.  Normal perfusion of the myocardium.  The MRI on call does not show any delayed enhancement.  I have called the   lab to see if they will expedite getting me the EF number so I can get the   report in the computer before I leave today but it does not sound   promising.  They are apparently pretty short staffed.    Lulu Bojorquez MD  11/05/21          chlorhexidine, 15 mL, Mouth/Throat, Q12H  digoxin, 125 mcg, Oral, Daily  dilTIAZem, 60 mg, Oral, Q8H  [START ON 11/7/2021] enoxaparin, 1 mg/kg, Subcutaneous, Q12H  furosemide, 20 mg, Oral, BID  losartan, 50 mg, Oral, Q24H  metoprolol succinate XL, 100 mg, Oral, Q12H  potassium chloride, 20 mEq, Oral, Daily  sodium chloride, 10 mL, Intravenous, Q12H           Medication Review:   Current Facility-Administered Medications   Medication Dose Route Frequency Provider Last Rate Last Admin   • atropine sulfate injection 0.5 mg  0.5 mg Intravenous Q5 Min PRN Kurt Evangelista MD       • atropine sulfate injection 0.5 mg  0.5 mg Intravenous Q5 Min PRN Kurt Evangelista MD       • calcium gluconate 1g/50ml 0.675% NaCl IV SOLN  1 g Intravenous PRN Kurt Evangelista MD        And   • calcium gluconate 6 g in sodium chloride 0.9 % 500 mL IVPB  6 g Intravenous PRN Kurt Evangelista MD       • chlorhexidine (PERIDEX) 0.12 % solution 15 mL  15 mL Mouth/Throat Q12H Kurt Evangelista MD   15 mL at 11/06/21 0810   • digoxin (LANOXIN) tablet 125 mcg  125 mcg Oral Daily Kurt Evangelista MD   125 mcg at 11/05/21 1401   • dilTIAZem (CARDIZEM) tablet 60 mg  60 mg Oral Q8H Jose Salcedo MD       • [START ON 11/7/2021] enoxaparin (LOVENOX) syringe 90 mg  1 mg/kg Subcutaneous Q12H Denis Melogza Jr., MD       • furosemide (LASIX) tablet 20 mg  20 mg Oral BID Lulu Rico MD   20 mg at 11/06/21 0810   • HYDROcodone-acetaminophen (NORCO) 5-325 MG per tablet 1  tablet  1 tablet Oral Q4H PRN Kurt Evangelista MD       • losartan (COZAAR) tablet 50 mg  50 mg Oral Q24H Kurt Evangelista MD   50 mg at 11/05/21 1402   • metoprolol succinate XL (TOPROL-XL) 24 hr tablet 100 mg  100 mg Oral Q12H Get Cunha MD   100 mg at 11/06/21 0810   • naloxone (NARCAN) injection 0.4 mg  0.4 mg Intravenous Q5 Min PRN Kurt Evangelista MD       • potassium & sodium phosphates (PHOS-NAK) 280-160-250 MG packet - for Phosphorus less than 1.25 mg/dL  2 packet Oral Q6H PRN Kurt Evangelista MD        Or   • potassium & sodium phosphates (PHOS-NAK) 280-160-250 MG packet - for Phosphorus 1.25 - 2.5 mg/dL  2 packet Oral Q6H PRN Kurt Evangelista MD   2 packet at 11/03/21 0615   • potassium chloride (K-DUR,KLOR-CON) ER tablet 20 mEq  20 mEq Oral Daily Lulu Rico MD   20 mEq at 11/06/21 0810   • potassium chloride (K-DUR,KLOR-CON) ER tablet 40 mEq  40 mEq Oral PRN Kurt Evangelista MD        Or   • potassium chloride (KLOR-CON) packet 40 mEq  40 mEq Oral PRN Kurt Evangelista MD   40 mEq at 11/01/21 2224    Or   • potassium chloride 10 mEq in 100 mL IVPB  10 mEq Intravenous Q1H PRN Kurt Evangelista  mL/hr at 11/02/21 2007 10 mEq at 11/02/21 2007   • sodium chloride 0.9 % flush 10 mL  10 mL Intravenous PRN Kurt Evangelista MD       • sodium chloride 0.9 % flush 10 mL  10 mL Intravenous PRN Kurt Evangelista MD       • sodium chloride 0.9 % flush 10 mL  10 mL Intravenous Q12H Kurt Evangelista MD   10 mL at 11/06/21 0811   • sodium chloride 0.9 % infusion 250 mL  250 mL Intravenous Once PRN Kurt Evangelista MD           Assessment/Plan         Cardiogenic shock (HCC)    Atrial fibrillation with rapid ventricular response (HCC)    Right upper quadrant abdominal pain    Shock liver    Mitral insufficiency, acute    Coagulopathy (HCC)    Thrombocytopenia (HCC)    Other specified anemias    Hypofibrinogenemia (HCC)    Acute deep vein thrombosis (DVT) of right upper extremity (HCC)    DVT, lower extremity,  proximal, acute, bilateral (HCC)    Pseudoaneurysm of femoral artery following procedure (HCC)       ASSESSMENT:  1. KRISHNA.    Nonoliguric.  Creatinine better this morning.  Electrolytes, volume status acceptable   2. Nonischemic cardiogenic shock . Off Impella. Biventricular failure. LV and RV improved on repeat echo 11/2.  Acute respiratory failure resolved off vent.    3. Shock liver.  Slowly improving.   4. TCP, coagulopathy with correction on mixing study. Dr. Melgoza evaluating.  Hypercoag state with multiple DVT  RIJ, RUE, bilateral lower ext DVT   5. Atrial fibrillation. SP cardioversion x 2 without success.  Digoxin loaded 11/1.  Oral metoprolol per cardiology.       PLAN:  Continue Lasix at current dose  Monitor electrolytes and replace as needed    Mirza Sumner MD  11/06/21  09:30 EDT

## 2021-11-06 NOTE — PLAN OF CARE
Goal Outcome Evaluation:  Plan of Care Reviewed With: patient        Progress: improving  Outcome Summary: Pt AO x 4 supine in bed. Pt transferred supine to sit with supervision. He completed sit to stand with CGA x 1. Pt ambulated 200 ft with SBA to CGA x 1 with no dyspnea or fatigued. Pt making good progress.    PPE: Patient was placed in face mask in first look. Patient was wearing facemask when I entered the room and throughout our encounter. I wore full protective equipment throughout this patient encounter including a face mask, and gloves. Hand hygiene was performed before donning protective equipment and after removal when leaving the room.

## 2021-11-06 NOTE — PROGRESS NOTES
Name: Denis Chavez ADMIT: 10/29/2021   : 1965  PCP: Provider, No Known    MRN: [id number] LOS: 8 days   AGE/SEX: 56 y.o. male  ROOM: [address] [place] [address]    [place], Subsequent Hospital Care    56 y.o. male with history of cardiac arrest and emergent cardiac catheterization with placement of Impella.  Now with right radial artery occlusion, upper and lower extremity DVT, and small post catheterization right femoral pseudoaneurysm.  Patient awake, alert, comfortable.  No specific complaints.  Specifically denies and symptoms like pain, numbness and tingling.  Minimal right groin and right lower extremity symptoms.  On Arixtra for DVT.    Scheduled Medications:   chlorhexidine, 15 mL, Mouth/Throat, Q12H  digoxin, 125 mcg, Oral, Daily  dilTIAZem, 60 mg, Oral, Q8H  fondaparinux, 7.5 mg, Subcutaneous, Q24H  furosemide, 20 mg, Oral, BID  losartan, 50 mg, Oral, Q24H  metoprolol succinate XL, 100 mg, Oral, Q12H  potassium chloride, 20 mEq, Oral, Daily  sodium chloride, 10 mL, Intravenous, Q12H    Vital Signs  Vital Signs Patient Vitals for the past 24 hrs:   BP Temp Temp src Pulse Resp SpO2 Weight   21 0741 106/79 98.1 °F (36.7 °C) Oral 120 18 97 % --   21 0622 -- -- -- -- -- -- 91.8 kg (202 lb 6.4 oz)   215 108/88 98.2 °F (36.8 °C) Oral 95 18 95 % --   21 -- -- -- -- -- 97 % --   21 100/75 -- -- 106 -- -- --   21 91/82 -- -- -- -- -- --   21 90/80 98.8 °F (37.1 °C) Oral 111 18 -- --   21 1537 113/91 98.5 °F (36.9 °C) Oral -- 18 -- --   21 1409 -- -- -- 108 -- -- --   21 -- -- -- 105 -- -- --   21 1405 -- -- -- 110 -- -- --   21 1404 -- -- -- 117 -- -- --   21 1403 -- -- -- 94 -- -- --   21 140 -- -- -- 112 -- -- --   21 140 105/87 -- -- 98 18 -- --   21 1400 -- -- -- 109 -- -- --   21 1359 -- -- -- 102 -- -- --   21 1358 -- -- -- 109 -- -- --   21 1357 -- -- -- 105  -- -- --   11/05/21 1356 -- -- -- 105 -- -- --   11/05/21 1355 -- -- -- (!) 124 -- -- --   11/05/21 1353 -- -- -- 104 -- -- --   11/05/21 1352 -- -- -- 110 -- -- --   11/05/21 1350 -- -- -- 112 -- -- --   11/05/21 1349 -- -- -- 112 -- -- --   11/05/21 1347 -- -- -- (!) 122 -- -- --   11/05/21 1345 -- -- -- 117 -- -- --   11/05/21 1344 -- -- -- (!) 124 -- -- --   11/05/21 1343 -- -- -- (!) 124 -- -- --   11/05/21 1341 -- -- -- (!) 125 -- -- --   11/05/21 1339 -- -- -- (!) 121 -- -- --   11/05/21 1338 -- -- -- (!) 154 -- -- --   11/05/21 1336 -- -- -- (!) 141 -- -- --   11/05/21 1335 -- -- -- (!) 130 -- -- --   11/05/21 1334 -- -- -- (!) 124 -- -- --   11/05/21 1330 -- -- -- (!) 144 -- -- --   11/05/21 1329 -- -- -- (!) 128 -- -- --   11/05/21 1326 -- -- -- 109 -- -- --   11/05/21 1325 -- -- -- (!) 128 -- -- --   11/05/21 1044 -- -- -- -- -- 93 % --   11/05/21 1043 -- -- -- 104 -- 92 % --   11/05/21 1042 -- -- -- 105 -- 93 % --   11/05/21 1040 -- -- -- 102 -- 97 % --   11/05/21 1039 -- -- -- 112 -- 91 % --   11/05/21 1038 -- -- -- (!) 123 -- 92 % --   11/05/21 1037 -- -- -- 120 -- 97 % --   11/05/21 1035 -- -- -- 119 -- 95 % --   11/05/21 1034 -- -- -- 116 -- 91 % --   11/05/21 1033 -- -- -- 114 -- (!) 85 % --   11/05/21 1032 -- -- -- 116 -- (!) 86 % --   11/05/21 1031 -- -- -- 114 -- (!) 84 % --   11/05/21 1030 -- -- -- 115 -- (!) 87 % --   11/05/21 1029 -- -- -- 114 -- 92 % --   11/05/21 1028 -- -- -- (!) 125 -- 92 % --   11/05/21 1027 -- -- -- 113 -- 93 % --   11/05/21 1025 -- -- -- 108 -- 95 % --   11/05/21 1024 -- -- -- 120 -- 90 % --   11/05/21 1023 -- -- -- 109 -- (!) 88 % --   11/05/21 1022 -- -- -- (!) 122 -- 90 % --   11/05/21 1021 -- -- -- 111 -- 96 % --   11/05/21 1020 -- -- -- 107 -- 96 % --   11/05/21 1019 -- -- -- 103 -- 97 % --   11/05/21 1018 -- -- -- 94 -- 95 % --   11/05/21 1017 -- -- -- (!) 132 -- 96 % --   11/05/21 1016 -- -- -- 120 -- 94 % --   11/05/21 1015 -- -- -- 107 -- 95 % --   11/05/21  1014 -- -- -- 111 -- 95 % --   11/05/21 1013 -- -- -- 102 -- 95 % --   11/05/21 1012 -- -- -- 111 -- 94 % --   11/05/21 1011 -- -- -- 112 -- 94 % --   11/05/21 1010 -- -- -- 97 -- 97 % --   11/05/21 1009 -- -- -- 118 -- 96 % --   11/05/21 1008 -- -- -- (!) 127 -- 96 % --   11/05/21 1007 -- -- -- 107 -- (!) 89 % --   11/05/21 1005 -- -- -- 114 -- 93 % --   11/05/21 1004 -- -- -- 103 -- 92 % --   11/05/21 1003 -- -- -- (!) 129 -- (!) 80 % --   11/05/21 1002 -- -- -- (!) 137 -- (!) 84 % --   11/05/21 1000 -- -- -- (!) 139 -- (!) 86 % --   11/05/21 0959 -- -- -- (!) 133 -- 96 % --   11/05/21 0958 -- -- -- 112 -- 96 % --   11/05/21 0957 -- -- -- 100 -- 93 % --   11/05/21 0956 -- -- -- 108 -- 94 % --   11/05/21 0955 -- -- -- 114 -- 90 % --   11/05/21 0954 -- -- -- 116 -- 96 % --   11/05/21 0953 -- -- -- 118 -- 96 % --   11/05/21 0952 -- -- -- (!) 126 -- 96 % --   11/05/21 0951 -- -- -- (!) 121 -- 94 % --   11/05/21 0950 -- -- -- 108 -- 96 % --   11/05/21 0949 -- -- -- 111 -- 91 % --   11/05/21 0947 -- -- -- 112 -- 95 % --   11/05/21 0946 -- -- -- (!) 130 -- 94 % --   11/05/21 0945 -- -- -- 108 -- 90 % --   11/05/21 0944 -- -- -- 105 -- 93 % --   11/05/21 0942 -- -- -- 117 -- 91 % --   11/05/21 0941 -- -- -- 105 -- 90 % --   11/05/21 0940 -- -- -- 104 -- 93 % --   11/05/21 0939 -- -- -- 115 -- -- --   11/05/21 0938 -- -- -- 110 -- 95 % --   11/05/21 0937 -- -- -- (!) 130 -- (!) 89 % --   11/05/21 0936 -- -- -- 108 -- 96 % --   11/05/21 0935 -- -- -- 112 -- 96 % --   11/05/21 0933 -- -- -- 109 -- 95 % --   11/05/21 0932 -- -- -- 95 -- 95 % --   11/05/21 0931 -- -- -- 101 -- 92 % --   11/05/21 0930 -- -- -- 108 -- 96 % --   11/05/21 0929 -- -- -- 110 -- 95 % --   11/05/21 0927 -- -- -- 95 -- 90 % --   11/05/21 0926 -- -- -- 107 -- (!) 89 % --   11/05/21 0925 -- -- -- 107 -- 94 % --   11/05/21 0924 -- -- -- 113 -- 96 % --   11/05/21 0923 -- -- -- (!) 126 -- 94 % --   11/05/21 0922 -- -- -- 110 -- 92 % --   11/05/21 0921 -- --  -- 120 -- 92 % --   11/05/21 0920 -- -- -- 110 -- (!) 89 % --   11/05/21 0919 -- -- -- 113 -- 96 % --   11/05/21 0918 -- -- -- (!) 124 -- 96 % --   11/05/21 0917 -- -- -- 112 -- 94 % --   11/05/21 0916 -- -- -- 104 -- (!) 88 % --   11/05/21 0915 -- -- -- 118 -- 95 % --   11/05/21 0914 -- -- -- 112 -- (!) 83 % --   11/05/21 0912 -- -- -- 104 -- 93 % --   11/05/21 0911 -- -- -- 93 -- 96 % --   11/05/21 0910 -- -- -- 103 -- 94 % --   11/05/21 0909 -- -- -- 94 -- 96 % --   11/05/21 0908 -- -- -- 114 -- 93 % --   11/05/21 0907 -- -- -- 95 -- 96 % --   11/05/21 0906 -- -- -- (!) 131 -- 94 % --   11/05/21 0905 -- -- -- 115 -- 95 % --     Physical Exam:    Awake, alert, comfortable.  Does not appear to be in pain.  Sensorimotor examination of right hand normal.  Palpable right ulnar artery pulse.  Minimal right upper and right lower extremity edema.  Scant bruising and very mild tenderness at right groin, but no mass or pseudoaneurysm palpated.     CBC    Results from last 7 days   Lab Units 11/06/21  0516 11/05/21  1442 11/04/21  0758 11/03/21  0932 11/03/21  0316 11/02/21  1806 11/02/21  0403 11/01/21  1522 11/01/21  1522   WBC 10*3/mm3 6.71 6.92 7.03  --  6.19 6.86 7.01  --  8.97   HEMOGLOBIN g/dL 12.7* 14.0 13.2  --  11.4* 12.3* 13.0  --  13.9   PLATELETS 10*3/mm3 89* 81* 46* 36* 36* 38* 56*   < > 66*    < > = values in this interval not displayed.     BMP   Results from last 7 days   Lab Units 11/06/21  0516 11/05/21  1442 11/04/21  0758 11/03/21  1951 11/03/21  0316 11/03/21  0026 11/02/21  1806 11/02/21  0924 11/02/21  0923 11/02/21  0403 11/02/21  0403 11/02/21  0009 11/01/21  2135 11/01/21  1522 11/01/21  0426 10/31/21  0825 10/31/21  0250 10/30/21  2227 10/30/21  1842   SODIUM mmol/L 142 137 138  --  143  --   --   --   --   --  142  --   --   --  139  --  138  --    < >   POTASSIUM mmol/L 3.6 3.4* 3.6  --  3.8  --   --  3.5  --   --  3.6  --  3.6  --  3.4*   < > 3.0*  --    < >   CHLORIDE mmol/L 105 100 98  --   105  --   --   --   --   --  102  --   --   --  103  --  100  --    < >   CO2 mmol/L 28.2 29.2* 30.7*  --  30.4*  --   --   --   --   --  28.4  --   --   --  26.5  --  24.6  --    < >   BUN mg/dL 16 19 19  --  21*  --   --   --   --   --  30*  --   --   --  37*  --  40*  --    < >   CREATININE mg/dL 0.92 1.14 0.87  --  1.02 1.06 1.28*  --  1.50*   < > 1.63*   < >  --    < > 1.85*  1.85*   < > 2.15* 2.23*   < >   GLUCOSE mg/dL 88 136* 97  --  94  --   --   --   --   --  108*  --   --   --  94  --  102*  --    < >   MAGNESIUM mg/dL 1.8 1.9 1.8  --  1.6  --   --   --   --   --   --   --   --   --   --   --  2.1 2.2  --    PHOSPHORUS mg/dL  --  2.4* 2.7 2.7 2.3*  --   --   --   --   --   --   --   --   --   --   --   --   --   --     < > = values in this interval not displayed.     Cr Clearance Estimated Creatinine Clearance: 99.1 mL/min (by C-G formula based on SCr of 0.92 mg/dL).  Coag   Results from last 7 days   Lab Units 11/06/21  0516 11/05/21  1442 11/04/21  0758 11/03/21  0316 11/02/21  1806 11/02/21  0558 11/01/21  1522   INR  1.18* 1.15* 1.18* 1.27* 1.26* 1.29* 1.32*   APTT seconds 28.8 30.8 27.4 30.1 30.5 42.2* 36.9*     Assessment/Plan   Assessment & Plan    Cardiogenic shock (HCC)    Atrial fibrillation with rapid ventricular response (HCC)    Right upper quadrant abdominal pain    Shock liver    Mitral insufficiency, acute    Coagulopathy (HCC)    Thrombocytopenia (HCC)    Other specified anemias    Hypofibrinogenemia (HCC)    Acute deep vein thrombosis (DVT) of right upper extremity (HCC)    DVT, lower extremity, proximal, acute, bilateral (HCC)    Pseudoaneurysm of femoral artery following procedure (HCC)    56 y.o. male with asymptomatic right radial artery occlusion, right upper and lower extremity DVT, and post catheterization right femoral pseudoaneurysm measuring about 11 mm.  On fondaparinux for DVT and cardiac indications.  For repeat arterial duplex scan today.  Hoping for spontaneous  resolution, but proposing noninterventional management for now.    Personal protective equipment used for this patient encounter:  Patient wearing surgical mask []    Provider wearing a surgical mask [x]    Gloves []    Eye protection []    Face Shield []    Gown []    N 95 respirator or CAPR/PAPR []   Duration of interaction about 4 minutes    Dave Kovacs MD  11/06/21  09:01 EDT    Please call my office with any question: [phone]    Active Hospital Problems    Diagnosis  POA   • **Cardiogenic shock (HCC) [R57.0]  Yes   • Acute deep vein thrombosis (DVT) of right upper extremity (HCC) [I82.621]  Unknown   • DVT, lower extremity, proximal, acute, bilateral (HCC) [I82.4Y3]  Unknown   • Pseudoaneurysm of femoral artery following procedure (HCC) [T81.718A, I72.4]  Unknown   • Other specified anemias [D64.89]  Unknown   • 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

## 2021-11-06 NOTE — THERAPY TREATMENT NOTE
Patient Name: Denis Chavez  : 1965    MRN: [id number]                              Today's Date: 2021       Admit Date: 10/29/2021    Visit Dx:     ICD-10-CM ICD-9-CM   1. Atrial fibrillation with rapid ventricular response (HCC)  I48.91 427.31   2. KRISHNA (acute kidney injury) (HCC)  N17.9 584.9   3. Hyperkalemia  E87.5 276.7   4. Right upper quadrant abdominal pain  R10.11 789.01   5. Elevated LFTs  R79.89 790.6   6. Cardiogenic shock (HCC)  R57.0 785.51     Patient Active Problem List   Diagnosis   • Atrial fibrillation with rapid ventricular response (HCC)   • Right upper quadrant abdominal pain   • Shock liver   • Mitral insufficiency, acute   • Cardiogenic shock (HCC)   • Coagulopathy (HCC)   • Thrombocytopenia (HCC)   • Other specified anemias   • Hypofibrinogenemia (HCC)   • Acute deep vein thrombosis (DVT) of right upper extremity (HCC)   • DVT, lower extremity, proximal, acute, bilateral (HCC)   • Pseudoaneurysm of femoral artery following procedure (HCC)     History reviewed. No pertinent past medical history.  Past Surgical History:   Procedure Laterality Date   • CARDIAC CATHETERIZATION Right 10/29/2021    Procedure: Left Heart Cath;  Surgeon: Get Cunha MD;  Location: [facility] CATH INVASIVE LOCATION;  Service: Cardiology;  Laterality: Right;   • CARDIAC CATHETERIZATION N/A 10/29/2021    Procedure: Coronary angiography;  Surgeon: Get Cunha MD;  Location: [facility] CATH INVASIVE LOCATION;  Service: Cardiology;  Laterality: N/A;   • CARDIAC ELECTROPHYSIOLOGY PROCEDURE N/A 10/30/2021    Procedure: IMPELLA INSERTION;  Surgeon: Get Cunha MD;  Location: [facility] CATH INVASIVE LOCATION;  Service: Cardiology;  Laterality: N/A;   • CARDIAC ELECTROPHYSIOLOGY PROCEDURE N/A 10/30/2021    Procedure: Cardioversion;  Surgeon: Get Cunha MD;  Location: [facility] CATH INVASIVE LOCATION;  Service: Cardiology;  Laterality: N/A;   • CARDIAC ELECTROPHYSIOLOGY PROCEDURE N/A 2021    Procedure:  Impella Removal;  Surgeon: Get Cunha MD;  Location:  [facility] CATH INVASIVE LOCATION;  Service: Cardiology;  Laterality: N/A;      General Information     Row Name 11/06/21 1050          Physical Therapy Time and Intention    Document Type therapy note (daily note)  -     Mode of Treatment physical therapy  -     Row Name 11/06/21 1050          General Information    Patient Profile Reviewed yes  -     Existing Precautions/Restrictions fall  -     Row Name 11/06/21 1050          Cognition    Orientation Status (Cognition) oriented x 4  -     Row Name 11/06/21 1050          Safety Issues, Functional Mobility    Impairments Affecting Function (Mobility) endurance/activity tolerance; balance; strength  -           User Key  (r) = Recorded By, (t) = Taken By, (c) = Cosigned By    Initials Name Provider Type    Talon Stuart, PT DPT Physical Therapist               Mobility     Row Name 11/06/21 1052          Bed Mobility    Bed Mobility sit-supine  -     Supine-Sit [place] (Bed Mobility) supervision  -     Sit-Supine [place] (Bed Mobility) supervision  -     Assistive Device (Bed Mobility) bed rails; head of bed elevated  -     Row Name 11/06/21 1052          Sit-Stand Transfer    Sit-Stand [place] (Transfers) contact guard  -     Row Name 11/06/21 1052          Gait/Stairs (Locomotion)    [place] Level (Gait) contact guard; verbal cues  -     Distance in Feet (Gait) 200  -     Deviations/Abnormal Patterns (Gait) gait speed decreased  -     Bilateral Gait Deviations forward flexed posture  -           User Key  (r) = Recorded By, (t) = Taken By, (c) = Cosigned By    Initials Name Provider Type    Talon Stuart, PT DPT Physical Therapist               Obj/Interventions    No documentation.                Goals/Plan    No documentation.                Clinical Impression     Row Name 11/06/21 1053          Pain Scale: Numbers Pre/Post-Treatment     Pretreatment Pain Rating 0/10 - no pain  -     Pain Intervention(s) Medication (See MAR); Repositioned  -     Row Name 11/06/21 1053          Plan of Care Review    Plan of Care Reviewed With patient  -LC     Progress improving  -     Outcome Summary Pt AO x 4 supine in bed. Pt transferred supine to sit with supervision. He completed sit to stand with CGA x 1. Pt ambulated 200 ft with SBA to CGA x 1 with no dyspnea or fatigued. Pt making good progress.  -     Row Name 11/06/21 1053          Positioning and Restraints    Pre-Treatment Position in bed  -LC     Post Treatment Position bed  -LC     In Bed notified nsg; sitting EOB; call light within reach; encouraged to call for assist  -           User Key  (r) = Recorded By, (t) = Taken By, (c) = Cosigned By    Initials Name Provider Type    Talon Stuart, PT DPT Physical Therapist               Outcome Measures     Row Name 11/06/21 1059          How much help from another person do you currently need...    Turning from your back to your side while in flat bed without using bedrails? 4  -LC     Moving from lying on back to sitting on the side of a flat bed without bedrails? 4  -LC     Moving to and from a bed to a chair (including a wheelchair)? 3  -LC     Standing up from a chair using your arms (e.g., wheelchair, bedside chair)? 3  -LC     Climbing 3-5 steps with a railing? 3  -LC     To walk in hospital room? 3  -LC     AM-PAC 6 Clicks Score (PT) 20  -     Row Name 11/06/21 1059          Functional Assessment    Outcome Measure Options AM-PAC 6 Clicks Basic Mobility (PT)  -           User Key  (r) = Recorded By, (t) = Taken By, (c) = Cosigned By    Initials Name Provider Type    Talon Stuart, PT DPT Physical Therapist                             Physical Therapy Education                 Title: PT OT SLP Therapies (Done)     Topic: Physical Therapy (Done)     Point: Mobility training (Done)     Learning Progress Summary           Patient  Acceptance, E,D, DU,VU by  at 11/6/2021 1059    Acceptance, E, VU,NR by  at 11/5/2021 1520    Acceptance, E, VU,NR by CF at 11/4/2021 1545                   Point: Home exercise program (Done)     Learning Progress Summary           Patient Acceptance, E,D, DU,VU by LC at 11/6/2021 1059                   Point: Body mechanics (Done)     Learning Progress Summary           Patient Acceptance, E,D, DU,VU by  at 11/6/2021 1059    Acceptance, E, VU,NR by CF at 11/4/2021 1545                   Point: Precautions (Done)     Learning Progress Summary           Patient Acceptance, E,D, DU,VU by  at 11/6/2021 1059    Acceptance, E, VU,NR by CF at 11/4/2021 1545                               User Key     Initials Effective Dates Name Provider Type Discipline    LB 03/07/18 -  Rajwinder Neil PTA Physical Therapy Assistant PT     07/02/20 -  Talon Siddiqui, PT DPT Physical Therapist PT    CF 06/16/21 -  Emma Hinds, GAYLE Physical Therapist PT              PT Recommendation and Plan     Plan of Care Reviewed With: patient  Progress: improving  Outcome Summary: Pt AO x 4 supine in bed. Pt transferred supine to sit with supervision. He completed sit to stand with CGA x 1. Pt ambulated 200 ft with SBA to CGA x 1 with no dyspnea or fatigued. Pt making good progress.     Time Calculation:    PT Charges     Row Name 11/06/21 1100             Time Calculation    Start Time 1000  -      Stop Time 1030  -      Time Calculation (min) 30 min  -      PT Received On 11/06/21  -      PT - Next Appointment 11/08/21  -              Time Calculation- PT    Total Timed Code Minutes- PT 30 minute(s)  -LC              Timed Charges    38636 - PT Therapeutic Activity Minutes 30  -LC              Total Minutes    Timed Charges Total Minutes 30  -LC       Total Minutes 30  -LC            User Key  (r) = Recorded By, (t) = Taken By, (c) = Cosigned By    Initials Name Provider Type    LC Talon Siddiqui, PT DPT Physical  Therapist              Therapy Charges for Today     Code Description Service Date Service Provider Modifiers Qty    [id number] HC PT THERAPEUTIC ACT EA 15 MIN 11/6/2021 Talon Siddiqui, PT DPT GP 2          PT G-Codes  Outcome Measure Options: AM-PAC 6 Clicks Basic Mobility (PT)  AM-PAC 6 Clicks Score (PT): 20    Talon Siddiqui PT DPT  11/6/2021

## 2021-11-07 LAB
ALBUMIN SERPL-MCNC: 3.4 G/DL (ref 3.5–5.2)
ALBUMIN/GLOB SERPL: 1.5 G/DL
ALP SERPL-CCNC: 84 U/L (ref 39–117)
ALT SERPL W P-5'-P-CCNC: 548 U/L (ref 1–41)
ANION GAP SERPL CALCULATED.3IONS-SCNC: 10.5 MMOL/L (ref 5–15)
AST SERPL-CCNC: 65 U/L (ref 1–40)
BASOPHILS # BLD AUTO: 0.04 10*3/MM3 (ref 0–0.2)
BASOPHILS NFR BLD AUTO: 0.6 % (ref 0–1.5)
BILIRUB SERPL-MCNC: 2 MG/DL (ref 0–1.2)
BUN SERPL-MCNC: 16 MG/DL (ref 6–20)
BUN/CREAT SERPL: 19 (ref 7–25)
CALCIUM SPEC-SCNC: 8.6 MG/DL (ref 8.6–10.5)
CHLORIDE SERPL-SCNC: 105 MMOL/L (ref 98–107)
CO2 SERPL-SCNC: 27.5 MMOL/L (ref 22–29)
CREAT SERPL-MCNC: 0.84 MG/DL (ref 0.76–1.27)
DEPRECATED RDW RBC AUTO: 47.4 FL (ref 37–54)
EOSINOPHIL # BLD AUTO: 0.2 10*3/MM3 (ref 0–0.4)
EOSINOPHIL NFR BLD AUTO: 2.8 % (ref 0.3–6.2)
ERYTHROCYTE [DISTWIDTH] IN BLOOD BY AUTOMATED COUNT: 14.1 % (ref 12.3–15.4)
GFR SERPL CREATININE-BSD FRML MDRD: 94 ML/MIN/1.73
GLOBULIN UR ELPH-MCNC: 2.2 GM/DL
GLUCOSE SERPL-MCNC: 90 MG/DL (ref 65–99)
HCT VFR BLD AUTO: 39 % (ref 37.5–51)
HGB BLD-MCNC: 13.2 G/DL (ref 13–17.7)
LYMPHOCYTES # BLD AUTO: 1.62 10*3/MM3 (ref 0.7–3.1)
LYMPHOCYTES NFR BLD AUTO: 23.1 % (ref 19.6–45.3)
MCH RBC QN AUTO: 31.6 PG (ref 26.6–33)
MCHC RBC AUTO-ENTMCNC: 33.8 G/DL (ref 31.5–35.7)
MCV RBC AUTO: 93.3 FL (ref 79–97)
MONOCYTES # BLD AUTO: 0.93 10*3/MM3 (ref 0.1–0.9)
MONOCYTES NFR BLD AUTO: 13.2 % (ref 5–12)
NEUTROPHILS NFR BLD AUTO: 4.18 10*3/MM3 (ref 1.7–7)
NEUTROPHILS NFR BLD AUTO: 59.6 % (ref 42.7–76)
PHOSPHATE SERPL-MCNC: 2.9 MG/DL (ref 2.5–4.5)
PLATELET # BLD AUTO: 131 10*3/MM3 (ref 140–450)
PMV BLD AUTO: 11.3 FL (ref 6–12)
POTASSIUM SERPL-SCNC: 3.6 MMOL/L (ref 3.5–5.2)
PROT SERPL-MCNC: 5.6 G/DL (ref 6–8.5)
RBC # BLD AUTO: 4.18 10*6/MM3 (ref 4.14–5.8)
SODIUM SERPL-SCNC: 143 MMOL/L (ref 136–145)
WBC # BLD AUTO: 7.02 10*3/MM3 (ref 3.4–10.8)

## 2021-11-07 PROCEDURE — 99232 SBSQ HOSP IP/OBS MODERATE 35: CPT | Performed by: INTERNAL MEDICINE

## 2021-11-07 PROCEDURE — 85025 COMPLETE CBC W/AUTO DIFF WBC: CPT | Performed by: INTERNAL MEDICINE

## 2021-11-07 PROCEDURE — 25010000002 ENOXAPARIN PER 10 MG: Performed by: INTERNAL MEDICINE

## 2021-11-07 PROCEDURE — 80053 COMPREHEN METABOLIC PANEL: CPT | Performed by: INTERNAL MEDICINE

## 2021-11-07 PROCEDURE — 36415 COLL VENOUS BLD VENIPUNCTURE: CPT | Performed by: INTERNAL MEDICINE

## 2021-11-07 PROCEDURE — 84100 ASSAY OF PHOSPHORUS: CPT | Performed by: INTERNAL MEDICINE

## 2021-11-07 RX ADMIN — LOSARTAN POTASSIUM 50 MG: 50 TABLET, FILM COATED ORAL at 11:00

## 2021-11-07 RX ADMIN — SODIUM CHLORIDE, PRESERVATIVE FREE 10 ML: 5 INJECTION INTRAVENOUS at 20:32

## 2021-11-07 RX ADMIN — DILTIAZEM HYDROCHLORIDE 60 MG: 60 TABLET, FILM COATED ORAL at 05:48

## 2021-11-07 RX ADMIN — METOPROLOL SUCCINATE 100 MG: 100 TABLET, EXTENDED RELEASE ORAL at 08:21

## 2021-11-07 RX ADMIN — SODIUM CHLORIDE, PRESERVATIVE FREE 10 ML: 5 INJECTION INTRAVENOUS at 08:22

## 2021-11-07 RX ADMIN — FUROSEMIDE 20 MG: 20 TABLET ORAL at 08:21

## 2021-11-07 RX ADMIN — POTASSIUM CHLORIDE 20 MEQ: 750 TABLET, EXTENDED RELEASE ORAL at 08:21

## 2021-11-07 RX ADMIN — ENOXAPARIN SODIUM 90 MG: 100 INJECTION, SOLUTION INTRAVENOUS; SUBCUTANEOUS at 10:59

## 2021-11-07 RX ADMIN — CHLORHEXIDINE GLUCONATE 15 ML: 1.2 RINSE ORAL at 08:21

## 2021-11-07 RX ADMIN — ENOXAPARIN SODIUM 90 MG: 100 INJECTION, SOLUTION INTRAVENOUS; SUBCUTANEOUS at 20:34

## 2021-11-07 NOTE — PROGRESS NOTES
"   LOS: 9 days    Patient Care Team:  Provider, No Known as PCP - General    Chief Complaint:    Chief Complaint   Patient presents with   • Abdominal Pain   • Nausea     Follow UP KRISHNA  Subjective     Interval History:     Patient resting comfortably.  Denies any chest pain, shortness of breath, nausea or vomiting    Objective     Vital Signs  Temp:  [98 °F (36.7 °C)-98.2 °F (36.8 °C)] 98 °F (36.7 °C)  Heart Rate:  [103-107] 107  Resp:  [18] 18  BP: ()/(69-90) 102/83    Flowsheet Rows      First Filed Value   Admission Height 175.3 cm (69\") Documented at 10/29/2021 1717   Admission Weight 99.8 kg (220 lb) Documented at 10/29/2021 1433          I/O this shift:  In: 300 [P.O.:300]  Out: -   No intake/output data recorded.    Intake/Output Summary (Last 24 hours) at 11/7/2021 1024  Last data filed at 11/7/2021 0725  Gross per 24 hour   Intake 300 ml   Output --   Net 300 ml       Physical Exam:  General Appearance: alert, oriented x 3, no acute distress,   Skin: warm and dry  HEENT: pupils round and reactive to light, oral mucosa normal, nonicteric sclera  Neck: supple, no JVD, trachea midline  Lungs: CTA, unlabored breathing effort  Heart: RRR, normal S1 and S2, no S3, no rub  Abdomen: soft, nontender, normoactive bowels  : no palpable bladder,  Extremities: trace edema, cyanosis or clubbing  Neuro: normal speech and mental status       Results Review:    Results from last 7 days   Lab Units 11/07/21  0420 11/06/21  0516 11/05/21  1442   SODIUM mmol/L 143 142 137   POTASSIUM mmol/L 3.6 3.6 3.4*   CHLORIDE mmol/L 105 105 100   CO2 mmol/L 27.5 28.2 29.2*   BUN mg/dL 16 16 19   CREATININE mg/dL 0.84 0.92 1.14   CALCIUM mg/dL 8.6 8.3* 8.6   BILIRUBIN mg/dL 2.0* 2.2* 2.2*   ALK PHOS U/L 84 84 113   ALT (SGPT) U/L 548* 691* 925*   AST (SGOT) U/L 65* 84* 126*   GLUCOSE mg/dL 90 88 136*       Estimated Creatinine Clearance: 108.3 mL/min (by C-G formula based on SCr of 0.84 mg/dL).    Results from last 7 days   Lab " Units 11/07/21  0420 11/06/21  0516 11/05/21  1442 11/04/21  0758   MAGNESIUM mg/dL  --  1.8 1.9 1.8   PHOSPHORUS mg/dL 2.9  --  2.4* 2.7             Results from last 7 days   Lab Units 11/07/21  0420 11/06/21  0516 11/05/21  1442 11/04/21  0758 11/03/21  0932 11/03/21  0316 11/03/21  0316   WBC 10*3/mm3 7.02 6.71 6.92 7.03  --   --  6.19   HEMOGLOBIN g/dL 13.2 12.7* 14.0 13.2  --   --  11.4*   PLATELETS 10*3/mm3 131* 89* 81* 46* 36*   < > 36*    < > = values in this interval not displayed.       Results from last 7 days   Lab Units 11/06/21  0516 11/05/21  1442 11/04/21  0758 11/03/21  0316 11/02/21  1806   INR  1.18* 1.15* 1.18* 1.27* 1.26*         Imaging Results (Last 24 Hours)     ** No results found for the last 24 hours. **        chlorhexidine, 15 mL, Mouth/Throat, Q12H  dilTIAZem, 90 mg, Oral, Q8H  enoxaparin, 1 mg/kg, Subcutaneous, Q12H  furosemide, 20 mg, Oral, BID  losartan, 50 mg, Oral, Q24H  metoprolol succinate XL, 100 mg, Oral, Q12H  potassium chloride, 20 mEq, Oral, Daily  sodium chloride, 10 mL, Intravenous, Q12H           Medication Review:   Current Facility-Administered Medications   Medication Dose Route Frequency Provider Last Rate Last Admin   • atropine sulfate injection 0.5 mg  0.5 mg Intravenous Q5 Min PRN Kurt Evangelista MD       • atropine sulfate injection 0.5 mg  0.5 mg Intravenous Q5 Min PRN Kurt Evangelista MD       • chlorhexidine (PERIDEX) 0.12 % solution 15 mL  15 mL Mouth/Throat Q12H Kurt Evangelista MD   15 mL at 11/07/21 0821   • dilTIAZem (CARDIZEM) tablet 90 mg  90 mg Oral Q8H Jose Salcedo MD       • enoxaparin (LOVENOX) syringe 90 mg  1 mg/kg Subcutaneous Q12H Denis Melgoza Jr., MD       • furosemide (LASIX) tablet 20 mg  20 mg Oral BID Lluu Rico MD   20 mg at 11/07/21 0821   • HYDROcodone-acetaminophen (NORCO) 5-325 MG per tablet 1 tablet  1 tablet Oral Q4H PRN Kurt Evangelista MD       • losartan (COZAAR) tablet 50 mg  50 mg Oral Q24H Kurt Evangelista,  MD   50 mg at 11/06/21 1225   • metoprolol succinate XL (TOPROL-XL) 24 hr tablet 100 mg  100 mg Oral Q12H Get Cunha MD   100 mg at 11/07/21 0821   • naloxone (NARCAN) injection 0.4 mg  0.4 mg Intravenous Q5 Min PRN Kurt Evangelista MD       • potassium chloride (K-DUR,KLOR-CON) ER tablet 20 mEq  20 mEq Oral Daily Lulu Rico MD   20 mEq at 11/07/21 0821   • potassium chloride (K-DUR,KLOR-CON) ER tablet 40 mEq  40 mEq Oral PRN Kurt Evangelista MD        Or   • potassium chloride (KLOR-CON) packet 40 mEq  40 mEq Oral PRN Kurt Evagnelista MD   40 mEq at 11/01/21 2224    Or   • potassium chloride 10 mEq in 100 mL IVPB  10 mEq Intravenous Q1H PRN Kurt Evangelista  mL/hr at 11/02/21 2007 10 mEq at 11/02/21 2007   • sodium chloride 0.9 % flush 10 mL  10 mL Intravenous Q12H Kurt Evangelista MD   10 mL at 11/07/21 0822   • sodium chloride 0.9 % infusion 250 mL  250 mL Intravenous Once PRN Kurt Evangelista MD           Assessment/Plan         Cardiogenic shock (HCC)    Atrial fibrillation with rapid ventricular response (HCC)    Right upper quadrant abdominal pain    Shock liver    Mitral insufficiency, acute    Coagulopathy (HCC)    Thrombocytopenia (HCC)    Other specified anemias    Hypofibrinogenemia (HCC)    Acute deep vein thrombosis (DVT) of right upper extremity (HCC)    DVT, lower extremity, proximal, acute, bilateral (HCC)    Pseudoaneurysm of femoral artery following procedure (HCC)       ASSESSMENT:  1. KRISHNA.    Nonoliguric.   Creatinine improved to 0.8 mg/dL.  Electrolytes, volume status acceptable   2. Nonischemic cardiogenic shock . Off Impella. Biventricular failure. LV and RV improved on repeat echo 11/2.  Acute respiratory failure resolved off vent.    3. Shock liver.  Slowly improving.   4. TCP, coagulopathy with correction on mixing study. Dr. Melgoza evaluating.  Hypercoag state with multiple DVT  RIJ, RUE, bilateral lower ext DVT   5. Atrial fibrillation. SP cardioversion x 2 without  success.  Digoxin loaded 11/1.  Oral metoprolol per cardiology.       PLAN:  Continue Lasix at current dose  Monitor electrolytes and replace as needed    Mirza Sumner MD  11/07/21  10:24 EST

## 2021-11-07 NOTE — PROGRESS NOTES
[facility] GROUP INPATIENT PROGRESS NOTE    Length of Stay:  9 days    CHIEF COMPLAINT: Cardiogenic shock with multiorgan failure, coagulopathy, thrombocytopenia, right upper extremity/IJ DVT      SUBJECTIVE: Patient today has no complaints.  He denies any bleeding issues overnight.  He is ambulatory without any current respiratory issues.      ROS:  Review of Systems a comprehensive review of systems was obtained with pertinent positive findings as noted in the interval history above.  All other systems negative.    OBJECTIVE:  Vitals:    11/06/21 1947 11/06/21 2301 11/07/21 0425 11/07/21 0725   BP: 106/69 94/72  102/83   BP Location: Left arm Left arm  Left arm   Patient Position: Lying Lying  Lying   Pulse: 103 107  107   Resp: 18 18  18   Temp: 98.2 °F (36.8 °C) 98 °F (36.7 °C)  98 °F (36.7 °C)   TempSrc: Oral Oral  Oral   SpO2: 96% 97%  97%   Weight:   91.1 kg (200 lb 14.4 oz)    Height:             PHYSICAL EXAMINATION:  General: Alert and oriented x3 no distress  Neck: Right IJ central catheter has been removed  Chest/Lungs: Clear to auscultation bilaterally  Heart: Irregular, no murmurs gallops or rubs  Abdomen/GI: Soft nontender nondistended bowel sounds present  Extremities: No significant edema right upper extremity.  Trace edema bilateral lower extremities    Patient was examined today, unchanged from above    DIAGNOSTIC DATA:  Results Review:     I reviewed the patient's new clinical results.    Results from last 7 days   Lab Units 11/07/21 0420 11/06/21 0516 11/05/21  1442   WBC 10*3/mm3 7.02 6.71 6.92   HEMOGLOBIN g/dL 13.2 12.7* 14.0   HEMATOCRIT % 39.0 37.5 40.8   PLATELETS 10*3/mm3 131* 89* 81*      Results from last 7 days   Lab Units 11/07/21  0420 11/06/21  0516 11/05/21  1442   SODIUM mmol/L 143 142 137   POTASSIUM mmol/L 3.6 3.6 3.4*   CHLORIDE mmol/L 105 105 100   CO2 mmol/L 27.5 28.2 29.2*   BUN mg/dL 16 16 19   CREATININE mg/dL 0.84 0.92 1.14   CALCIUM mg/dL 8.6 8.3* 8.6    BILIRUBIN mg/dL 2.0* 2.2* 2.2*   ALK PHOS U/L 84 84 113   ALT (SGPT) U/L 548* 691* 925*   AST (SGOT) U/L 65* 84* 126*   GLUCOSE mg/dL 90 88 136*      Lab Results   Component Value Date    NEUTROABS 4.18 11/07/2021     Results from last 7 days   Lab Units 11/06/21  0516 11/05/21  1442 11/04/21  0758   INR  1.18* 1.15* 1.18*   APTT seconds 28.8 30.8 27.4     Results from last 7 days   Lab Units 11/06/21  0516   MAGNESIUM mg/dL 1.8       Assessment/Plan   ASSESSMENT/PLAN:  This is a 56 y.o. male with:     Cardiogenic shock, atrial fibrillation  · Biventricular failure, etiology unclear  · Patient requiring pressor support  · Patient had transthoracic echocardiogram, and cardiac catheterization on 10/29/2021.  · Status post cardioversion x2, persistent atrial fibrillation  · Impella device placed, initiated heparin  · Patient had POOJA on 10/30/2021 with ejection fraction 26-30%, severe dilation right ventricular cavity, moderate mitral regurgitation, Impella device in left ventricle.  · Investigation regarding potential cardiac transplant per cardiology, does not appear feasible due to lack of social support.  · Patient currently off of pressors, continuing on milrinone  · Bedside echocardiogram 11/2/2021 with ejection fraction 50%, low normal, normal right ventricular systolic function.  · Impella device removed on 11/2/2021, flat rate heparin discontinued as well  · Cardiac MRI on 11/5/2021 with global hypokinesis left ventricle, hypokinesis right ventricle, moderate biatrial enlargement, no evidence of infiltrative cardiomyopathy.  · Per Dr. Ma, patient will require ongoing anticoagulation due to atrial fibrillation.     *Acute hypoxic respiratory failure    · Patient previously required ventilatory support  · Patient extubated on 11/2/2021  · Complete resolution of pulmonary symptoms     *Shock liver  · Severe hepatic dysfunction secondary to cardiogenic shock  · Transaminases gradually trending down with  labs today showing , AST 65, total bilirubin 2.0.    *KRISHNA  · Secondary to cardiogenic shock  · Nephrology following  · Creatinine has continued to improve, 0.84 today     *Coagulopathy with hypofibrinogenemia/DIC  · Likely secondary to shock liver and reduced synthetic function in addition to possible effects from Impella device producing consumption  · Heparin drip initiated with placement of Impella device  · Patient has received FFP, cryoprecipitate, vitamin K  · PT mixing study 10/31/2021 with decreased from 22.3 down to 14.9 (near correction indicating likely a factor deficiency)  · Reluctant to administer further FFP/cryoprecipitate in the setting of active thrombosis (see below).  · Subsequent improvement in coagulation parameters spontaneously  · Labs last checked on 11/6/2021 and normal with INR 1.18, PTT 28.8, fibrinogen 280.  No plans for ongoing routine coag monitoring with stable values.    *Right upper extremity/IJ catheter associated/IJ DVT 10/31/2021 with subsequent acute right upper extremity IJ and subclavian, subacute right brachial DVT, acute bilateral upper extremity superficial thrombophlebitis, acute right femoral/calf DVT, acute left femoral DVT 11/3/2021  · Doppler on 10/31/2021 with acute right upper extremity catheter associated IJ thrombus, acute left upper extremity superficial thrombophlebitis (cephalic).  · Patient was previously receiving fixed rate heparin with Impella device (heparin initiated 10/30/2021) until Impella device was removed on 11/2/2021  · Orders placed for initiation of full dose heparin on 11/2/2021 however heparin was not initiated due to platelet count of 38,000.  · Dopplers on 11/3/2021 showed acute right upper extremity IJ and subclavian, subacute right brachial DVT, acute bilateral upper extremity superficial thrombophlebitis, acute right femoral/calf DVT, acute left femoral DVT  · With concern for heparin-induced thrombocytopenia and extensive  thrombosis, initiated anticoagulation on 11/4/2021 with Arixtra 2.5 mg daily in the setting of platelet count 46,000.  Plan to gradually increase Arixtra dose as platelet count improves.  · On 11/5/2021, platelet count up to 81,000.  Heparin antiplatelet antibody and serotonin release were still pending.  With improvement in the platelet count we will go ahead increased Arixtra to 7.5 mg daily.  · Labs from 11/3/2021 returned with negative heparin antiplatelet antibody (KASEY 0.129), serotonin release assay negative.  · With negative heparin antibody, changed from Arixtra to Lovenox 1 mg/kg (90 mg) every 12 hours beginning 11/6/2021.    · Patient as above will begin Lovenox today.  Anticipate transition to oral Eliquis but would prefer additional improvement in LFTs first.    *Thrombocytopenia.   · No prior labs available  · On admission 10/29/2021 platelets 131,000.   · Platelets trended down, 71,000 on 10/30/2021  · Labs on 10/31/2021 with negative heparin antiplatelet antibody with KASEY 0.121 OD.  · CT abdomen and pelvis 10/29/2021 with normal spleen.  · Thrombocytopenia felt to be related to consumption initially due to shock/DIC with exacerbation by Impella device  · Patient received platelet transfusion 2 units around time of placement of Impella device  · On 10/31/2021 platelet count 44,000, received 1 additional unit platelets with increase to 79,000  · Impella device was removed on 11/2/2021  · Platelet count declined further down to 38,000 on 11/2/2021 and 36,000 on 11/3/2021. With resolution of DIC by coag parameters and removal of Impella device, unclear as to the etiology of the worsening thrombocytopenia.   · On 11/3/2021, sent additional evaluation with evaluation today with B12 greater than 2000, folate 17.1, IPF 7.7% (elevated).  Serum protein electrophoresis and immunoelectrophoresis negative with normal quantitative immunoglobulin, free light chains unremarkable.  Negative heparin antiplatelet  antibody (KASEY 0.129), serotonin release assay negative.  · Platelet count improved today at 89,000.  Suspect that thrombocytopenia was purely consumptive in nature related to Impella device previously and subsequently to diffuse thromboses.    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    · Vascular surgery consulted, perfusion normal right hand, no intervention needed.    · Repeat Doppler on 11/6/2021 with no change in 1.1 cm right femoral pseudoaneurysm with 50% thrombosis.    · Plans by vascular surgery to manage conservatively and observe.    *Previous erythrocytosis  · Hematocrit on admission was 53.3 10/29/2021  · Hematocrit has gradually declined since admission into the 12-13 range  · Current hematocrit 39    *Possible pneumonia/sepsis  · Patient received empiric Zosyn, completed course.     PLAN:  1. Today begin Lovenox 1 mg/kg every 12 hours (90 mg).  Transitioning from Arixtra received yesterday.  No evidence of HIT by laboratory studies.  2. Anticipate transition to oral Eliquis once LFTs improve further.  3. Note plans per vascular surgery to observe right femoral pseudoaneurysm  4. Daily CBC, CMP    Discussed with patient at bedside           Denis Melgoza MD

## 2021-11-07 NOTE — PROGRESS NOTES
LOS: 9 days   Patient Care Team:  Provider, No Known as PCP - General    Chief Complaint:   Cardiogenic shock, atrial fibrillation    Interval History:   No acute interval events.  Heart rate is improved on diltiazem.  He is tolerating it without any evidence of shock.  LFT's continue to improve.     Objective   Vital Signs  Temp:  [98 °F (36.7 °C)-98.2 °F (36.8 °C)] 98 °F (36.7 °C)  Heart Rate:  [103-107] 107  Resp:  [18] 18  BP: ()/(69-90) 102/83    Intake/Output Summary (Last 24 hours) at 11/7/2021 0927  Last data filed at 11/7/2021 0725  Gross per 24 hour   Intake 300 ml   Output --   Net 300 ml       Review of Systems   Constitutional: Negative.   Cardiovascular: Negative.    Respiratory: Negative.    Gastrointestinal: Negative.        Physical Exam  Vitals and nursing note reviewed.   Constitutional:       Appearance: Normal appearance.   HENT:      Head: Normocephalic.   Cardiovascular:      Rate and Rhythm: Tachycardia present. Rhythm irregular.   Neurological:      General: No focal deficit present.      Mental Status: He is alert and oriented to person, place, and time.   Psychiatric:         Mood and Affect: Mood normal.         Behavior: Behavior normal.           Results Review:      Results from last 7 days   Lab Units 11/07/21 0420 11/06/21 0516 11/06/21 0516 11/05/21 1442 11/05/21  1442   SODIUM mmol/L 143  --  142  --  137   POTASSIUM mmol/L 3.6  --  3.6  --  3.4*   CHLORIDE mmol/L 105  --  105  --  100   CO2 mmol/L 27.5  --  28.2  --  29.2*   BUN mg/dL 16  --  16  --  19   CREATININE mg/dL 0.84  --  0.92  --  1.14   GLUCOSE mg/dL 90   < > 88   < > 136*   CALCIUM mg/dL 8.6  --  8.3*  --  8.6    < > = values in this interval not displayed.     Results from last 7 days   Lab Units 10/31/21  1527   CK TOTAL U/L 339*     Results from last 7 days   Lab Units 11/07/21 0420 11/06/21 0516 11/05/21  1442   WBC 10*3/mm3 7.02 6.71 6.92   HEMOGLOBIN g/dL 13.2 12.7* 14.0   HEMATOCRIT % 39.0 37.5  40.8   PLATELETS 10*3/mm3 131* 89* 81*     Results from last 7 days   Lab Units 11/06/21  0516 11/05/21  1442 11/04/21  0758   INR  1.18* 1.15* 1.18*   APTT seconds 28.8 30.8 27.4         Results from last 7 days   Lab Units 11/06/21  0516   MAGNESIUM mg/dL 1.8     Results from last 7 days   Lab Units 11/02/21  0403   TRIGLYCERIDES mg/dL 102       I reviewed the patient's new clinical results.  I personally viewed and interpreted the patient's EKG/Telemetry data        Medication Review:   chlorhexidine, 15 mL, Mouth/Throat, Q12H  dilTIAZem, 90 mg, Oral, Q8H  enoxaparin, 1 mg/kg, Subcutaneous, Q12H  furosemide, 20 mg, Oral, BID  losartan, 50 mg, Oral, Q24H  metoprolol succinate XL, 100 mg, Oral, Q12H  potassium chloride, 20 mEq, Oral, Daily  sodium chloride, 10 mL, Intravenous, Q12H             Assessment/Plan       Cardiogenic shock (HCC)    Atrial fibrillation with rapid ventricular response (HCC)    Right upper quadrant abdominal pain    Shock liver    Mitral insufficiency, acute    Coagulopathy (HCC)    Thrombocytopenia (HCC)    Other specified anemias    Hypofibrinogenemia (HCC)    Acute deep vein thrombosis (DVT) of right upper extremity (HCC)    DVT, lower extremity, proximal, acute, bilateral (HCC)    Pseudoaneurysm of femoral artery following procedure (HCC)    Atrial Fibrillation with RVR and 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  Rate control is better with diltiazem.  He is tolerating it without hemodynamic compromise.  I am going to stop digoxin and increase diltiazem further in hope of simplify medication regimen.  He is currently on Lovenox, but anticipate transition to NOAC at the discretion of hematology.    Patient with right pseudoaneurysm, upper extremity DVT.  On Lovenox.  Vascular surgery consulted and recommends observation at this time.    Thrombocytopenia continues to improve.  Does not appear to have had HIT.  On Lovenox.  Plan for transition to NOAC at discretion  hematology.    Anticipate discharge in the next day.    In regards to his long-term atrial fibrillation plan, once LFTs normalize, would load with amiodarone and cardiovert.  We will plan for short to intermediate term amiodarone for maintenance of sinus rhythm, consider ablation if breakthrough.        Jose Salcedo MD  11/07/21  09:27 EST

## 2021-11-07 NOTE — PROGRESS NOTES
Name: Denis Chavez ADMIT: 10/29/2021   : 1965  PCP: Provider, No Known    MRN: [id number] LOS: 9 days   AGE/SEX: 56 y.o. male  ROOM: /     Subjective   Subjective   Reports overall feeling continued improvement.      Review of Systems     Objective   Objective   Vital Signs  Temp:  [98 °F (36.7 °C)-98.2 °F (36.8 °C)] 98 °F (36.7 °C)  Heart Rate:  [102-107] 103  Resp:  [18] 18  BP: ()/(48-90) 100/79  SpO2:  [96 %-100 %] 100 %  on   ;   Device (Oxygen Therapy): room air  Body mass index is 29.65 kg/m².  Physical Exam  Vitals and nursing note reviewed.   Constitutional:       General: He is not in acute distress.     Appearance: Normal appearance.   Neck:      Vascular: No JVD.      Trachea: No tracheal deviation.   Cardiovascular:      Rate and Rhythm: Normal rate and regular rhythm.      Heart sounds: Normal heart sounds.   Pulmonary:      Effort: Pulmonary effort is normal. No respiratory distress.      Breath sounds: Normal breath sounds.   Abdominal:      General: Bowel sounds are normal.      Palpations: Abdomen is soft.      Tenderness: There is no abdominal tenderness.   Skin:     General: Skin is warm and dry.   Neurological:      General: No focal deficit present.      Mental Status: He is alert and oriented to person, place, and time.   Psychiatric:         Behavior: Behavior normal. Behavior is cooperative.         Results Review     I reviewed the patient's new clinical results.  Results from last 7 days   Lab Units 21  0758   WBC 10*3/mm3 7.02 6.71 6.92 7.03   HEMOGLOBIN g/dL 13.2 12.7* 14.0 13.2   PLATELETS 10*3/mm3 131* 89* 81* 46*     Results from last 7 days   Lab Units 21  0758   SODIUM mmol/L 143 142 137 138   POTASSIUM mmol/L 3.6 3.6 3.4* 3.6   CHLORIDE mmol/L 105 105 100 98   CO2 mmol/L 27.5 28.2 29.2* 30.7*   BUN mg/dL 16 16 19 19   CREATININE mg/dL 0.84 0.92 1.14 0.87    GLUCOSE mg/dL 90 88 136* 97   EGFR IF NONAFRICN AM mL/min/1.73 94 85 66 90     Results from last 7 days   Lab Units 11/07/21  0420 11/06/21  0516 11/05/21  1442 11/04/21  0758   ALBUMIN g/dL 3.40* 3.10* 3.40* 3.30*   BILIRUBIN mg/dL 2.0* 2.2* 2.2* 2.9*   ALK PHOS U/L 84 84 113 105   AST (SGOT) U/L 65* 84* 126* 245*   ALT (SGPT) U/L 548* 691* 925* 1,352*     Results from last 7 days   Lab Units 11/07/21  0420 11/06/21  0516 11/05/21  1442 11/04/21  0758 11/03/21  1951 11/03/21  0932 11/03/21  0316 11/03/21  0316   CALCIUM mg/dL 8.6 8.3* 8.6 8.5*  --   --    < > 8.1*   ALBUMIN g/dL 3.40* 3.10* 3.40* 3.30*  --    < >   < > 2.90*   MAGNESIUM mg/dL  --  1.8 1.9 1.8  --   --   --  1.6   PHOSPHORUS mg/dL 2.9  --  2.4* 2.7 2.7  --   --  2.3*    < > = values in this interval not displayed.     Results from last 7 days   Lab Units 11/03/21  0316 11/03/21  0026 11/02/21  1806 11/02/21  0924   LACTATE mmol/L 1.1 1.2 1.4 1.3     COVID19   Date Value Ref Range Status   10/29/2021 Not Detected Not Detected - Ref. Range Final     Glucose   Date/Time Value Ref Range Status   11/04/2021 1939 159 (H) 70 - 130 mg/dL Final     Comment:     Meter: [id number] : [id number] Fabián Borrego ELVA       Duplex Groin Pseudoaneurysm Bilateral CAR  · Normal right external iliac, common and deep femoral arteries.  · Patent superficial femoral artery with 1.1 cm postcatheterization   pseudoaneurysm, which appears about 50% thrombosed.       Scheduled Medications  chlorhexidine, 15 mL, Mouth/Throat, Q12H  dilTIAZem, 90 mg, Oral, Q8H  enoxaparin, 1 mg/kg, Subcutaneous, Q12H  furosemide, 20 mg, Oral, BID  losartan, 50 mg, Oral, Q24H  metoprolol succinate XL, 100 mg, Oral, Q12H  potassium chloride, 20 mEq, Oral, Daily  sodium chloride, 10 mL, Intravenous, Q12H    Infusions   Diet  Diet Regular; Cardiac       Assessment/Plan     Active Hospital Problems    Diagnosis  POA   • **Cardiogenic shock (HCC) [R57.0]  Yes   • Acute deep vein thrombosis (DVT)  of right upper extremity (HCC) [I82.621]  Unknown   • DVT, lower extremity, proximal, acute, bilateral (HCC) [I82.4Y3]  Unknown   • Pseudoaneurysm of femoral artery following procedure (HCC) [T81.718A, I72.4]  Unknown   • Other specified anemias [D64.89]  Unknown   • 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 male admitted with Cardiogenic shock (HCC).    Labs continue to improve.  He has been transitioned to full dose Lovenox by hematology.  Discussed with cardiology earlier.  Plan to observe again tonight but possible discharge in the next 1 to 2 days.  Hematology likely to start on Eliquis at discharge.  Vascular surgery does not plan intervention on right femoral pseudoaneurysm.    Patient states he has still not had an opportunity to discuss results of his cardiac MRI with cardiology.      · Pharmacy to dose Lovenox for DVT prophylaxis/treatment.  · Full code.  · Discussed with patient and consulting provider.  · Anticipate discharge home with home health in 1-2 days.      Edy Khan MD  [facility]  11/07/21  15:18 EST

## 2021-11-07 NOTE — PROGRESS NOTES
Name: Denis Chavez ADMIT: 10/29/2021   : 1965  PCP: Provider, No Known    MRN: [id number] LOS: 9 days   AGE/SEX: 56 y.o. male  ROOM: 2203/1   [facility]    [place], Subsequent Hospital Care    56 y.o. male who suffered cardiac arrest and underwent emergency cardiac catheterization with Impella support.  Course March with success, but patient also developed asymptomatic right radial artery occlusion, post catheterization right femoral artery pseudoaneurysm, and right upper extremity DVT.  Patient had been receiving fondaparinux, now transitioned to therapeutic low molecular weight heparin, with plan for eventual transition to DOAC.  Patient is comfortable, has been up and about steady on his feet, hoping to go home.  No weakness, numbness or paresthesias of right hand.  Modest upper extremity swelling.  Mild right groin pain, improving.    Scheduled Medications:   chlorhexidine, 15 mL, Mouth/Throat, Q12H  digoxin, 125 mcg, Oral, Daily  dilTIAZem, 60 mg, Oral, Q8H  enoxaparin, 1 mg/kg, Subcutaneous, Q12H  furosemide, 20 mg, Oral, BID  losartan, 50 mg, Oral, Q24H  metoprolol succinate XL, 100 mg, Oral, Q12H  potassium chloride, 20 mEq, Oral, Daily  sodium chloride, 10 mL, Intravenous, Q12H    Vital Signs  Vital Signs Patient Vitals for the past 24 hrs:   BP Temp Temp src Pulse Resp SpO2 Weight   21 0725 102/83 98 °F (36.7 °C) Oral 107 18 97 % --   21 0425 -- -- -- -- -- -- 91.1 kg (200 lb 14.4 oz)   21 2301 94/72 98 °F (36.7 °C) Oral 107 18 97 % --   21 1947 106/69 98.2 °F (36.8 °C) Oral 103 18 96 % --   21 1617 116/90 98 °F (36.7 °C) Oral -- 18 -- --     I/O:  No intake/output data recorded.    Physical Exam: Afebrile, a bit tachycardic.  Palpable right ulnar artery pulse.  Sensorimotor examination of hand normal.  Trace right upper extremity swelling with pitting edema.  Resolving Minor ecchymosis at right groin, but no palpable pseudoaneurysm, hematoma or tenderness.      CBC    Results from last 7 days   Lab Units 11/07/21 0420 11/06/21  0516 11/05/21  1442 11/04/21  0758 11/03/21  0932 11/03/21  0316 11/02/21  1806 11/02/21  0403 11/02/21  0403   WBC 10*3/mm3 7.02 6.71 6.92 7.03  --  6.19 6.86  --  7.01   HEMOGLOBIN g/dL 13.2 12.7* 14.0 13.2  --  11.4* 12.3*  --  13.0   PLATELETS 10*3/mm3 131* 89* 81* 46* 36* 36* 38*   < > 56*    < > = values in this interval not displayed.     BMP   Results from last 7 days   Lab Units 11/07/21 0420 11/06/21 0516 11/05/21  1442 11/04/21  0758 11/03/21  1951 11/03/21 0316 11/03/21  0026 11/02/21 1806 11/02/21  0924 11/02/21  0923 11/02/21  0403 11/01/21  1522 11/01/21 0426   SODIUM mmol/L 143 142 137 138  --  143  --   --   --   --  142  --  139   POTASSIUM mmol/L 3.6 3.6 3.4* 3.6  --  3.8  --   --  3.5  --  3.6   < > 3.4*   CHLORIDE mmol/L 105 105 100 98  --  105  --   --   --   --  102  --  103   CO2 mmol/L 27.5 28.2 29.2* 30.7*  --  30.4*  --   --   --   --  28.4  --  26.5   BUN mg/dL 16 16 19 19  --  21*  --   --   --   --  30*  --  37*   CREATININE mg/dL 0.84 0.92 1.14 0.87  --  1.02 1.06 1.28*  --    < > 1.63*   < > 1.85*  1.85*   GLUCOSE mg/dL 90 88 136* 97  --  94  --   --   --   --  108*  --  94   MAGNESIUM mg/dL  --  1.8 1.9 1.8  --  1.6  --   --   --   --   --   --   --    PHOSPHORUS mg/dL 2.9  --  2.4* 2.7 2.7 2.3*  --   --   --   --   --   --   --     < > = values in this interval not displayed.     Cr Clearance Estimated Creatinine Clearance: 108.3 mL/min (by C-G formula based on SCr of 0.84 mg/dL).  Coag   Results from last 7 days   Lab Units 11/06/21  0516 11/05/21  1442 11/04/21  0758 11/03/21  0316 11/02/21  1806 11/02/21  0558 11/01/21  1522   INR  1.18* 1.15* 1.18* 1.27* 1.26* 1.29* 1.32*   APTT seconds 28.8 30.8 27.4 30.1 30.5 42.2* 36.9*     Repeat right groin duplex scan reviewed.  Has small groin hematoma with 1.1 cm active pseudoaneurysm associated with proximal superficial femoral artery and very short neck.  No  AV fistula.  Normal flow through the femoral arteries.      Assessment/Plan   Assessment & Plan    Cardiogenic shock (HCC)    Atrial fibrillation with rapid ventricular response (HCC)    Right upper quadrant abdominal pain    Shock liver    Mitral insufficiency, acute    Coagulopathy (HCC)    Thrombocytopenia (HCC)    Other specified anemias    Hypofibrinogenemia (HCC)    Acute deep vein thrombosis (DVT) of right upper extremity (HCC)    DVT, lower extremity, proximal, acute, bilateral (HCC)    Pseudoaneurysm of femoral artery following procedure (HCC)    56 y.o. male with post catheterization proximal right SFA pseudoaneurysm.  The active component to the pseudoaneurysm is 1.1 cm, with very short neck.  Patient also with asymptomatic right radial artery occlusion, stable.  Patient also with DVT involving the right internal jugular, subclavian and brachial veins, with superficial vein thrombosis involving the cephalic and basilic veins.    Occurs in the context of recent cardiac arrest and tachyarrhythmia.  This may well resolve spontaneously, though the prognosis for spontaneous resolution is less favorable with the patient receiving anticoagulation.  Feel that the risk of ultrasound-guided thrombin injection repair and open repair exceed risk of observation and serial surveillance.   I have no objection to home discharge, which is what the patient is anticipating, with outpatient follow-up with Dr. Prince.  We will continue to follow as long as he is in hospital.    Personal protective equipment used for this patient encounter:  Patient wearing surgical mask []    Provider wearing a surgical mask [x]    Gloves []    Eye protection []    Face Shield []    Gown []    N 95 respirator or CAPR/PAPR []   Duration of interaction about 5 minutes    Dave oKvacs MD  11/07/21  08:15 EST    Please call my office with any question: [phone]    Active Hospital Problems    Diagnosis  POA   • **Cardiogenic shock (HCC)  [R57.0]  Yes   • Acute deep vein thrombosis (DVT) of right upper extremity (HCC) [I82.621]  Unknown   • DVT, lower extremity, proximal, acute, bilateral (HCC) [I82.4Y3]  Unknown   • Pseudoaneurysm of femoral artery following procedure (HCC) [T81.718A, I72.4]  Unknown   • Other specified anemias [D64.89]  Unknown   • 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

## 2021-11-08 ENCOUNTER — READMISSION MANAGEMENT (OUTPATIENT)
Dept: CALL CENTER | Facility: HOSPITAL | Age: 56
End: 2021-11-08

## 2021-11-08 VITALS
TEMPERATURE: 99.1 F | BODY MASS INDEX: 29.61 KG/M2 | SYSTOLIC BLOOD PRESSURE: 108 MMHG | HEART RATE: 101 BPM | HEIGHT: 69 IN | OXYGEN SATURATION: 98 % | RESPIRATION RATE: 17 BRPM | WEIGHT: 199.9 LBS | DIASTOLIC BLOOD PRESSURE: 70 MMHG

## 2021-11-08 LAB
ALBUMIN SERPL-MCNC: 3.4 G/DL (ref 3.5–5.2)
ALBUMIN/GLOB SERPL: 1.6 G/DL
ALP SERPL-CCNC: 80 U/L (ref 39–117)
ALT SERPL W P-5'-P-CCNC: 412 U/L (ref 1–41)
ANION GAP SERPL CALCULATED.3IONS-SCNC: 10.5 MMOL/L (ref 5–15)
AST SERPL-CCNC: 50 U/L (ref 1–40)
BASOPHILS # BLD AUTO: 0.04 10*3/MM3 (ref 0–0.2)
BASOPHILS NFR BLD AUTO: 0.6 % (ref 0–1.5)
BILIRUB SERPL-MCNC: 1.9 MG/DL (ref 0–1.2)
BUN SERPL-MCNC: 18 MG/DL (ref 6–20)
BUN/CREAT SERPL: 19.4 (ref 7–25)
CALCIUM SPEC-SCNC: 8.2 MG/DL (ref 8.6–10.5)
CHLORIDE SERPL-SCNC: 107 MMOL/L (ref 98–107)
CO2 SERPL-SCNC: 23.5 MMOL/L (ref 22–29)
CREAT SERPL-MCNC: 0.93 MG/DL (ref 0.76–1.27)
DEPRECATED RDW RBC AUTO: 49 FL (ref 37–54)
EOSINOPHIL # BLD AUTO: 0.12 10*3/MM3 (ref 0–0.4)
EOSINOPHIL NFR BLD AUTO: 1.8 % (ref 0.3–6.2)
ERYTHROCYTE [DISTWIDTH] IN BLOOD BY AUTOMATED COUNT: 14.5 % (ref 12.3–15.4)
GFR SERPL CREATININE-BSD FRML MDRD: 84 ML/MIN/1.73
GLOBULIN UR ELPH-MCNC: 2.1 GM/DL
GLUCOSE SERPL-MCNC: 96 MG/DL (ref 65–99)
HCT VFR BLD AUTO: 38.6 % (ref 37.5–51)
HGB BLD-MCNC: 12.5 G/DL (ref 13–17.7)
IMM GRANULOCYTES # BLD AUTO: 0.04 10*3/MM3 (ref 0–0.05)
IMM GRANULOCYTES NFR BLD AUTO: 0.6 % (ref 0–0.5)
LYMPHOCYTES # BLD AUTO: 1.27 10*3/MM3 (ref 0.7–3.1)
LYMPHOCYTES NFR BLD AUTO: 18.9 % (ref 19.6–45.3)
MCH RBC QN AUTO: 30.7 PG (ref 26.6–33)
MCHC RBC AUTO-ENTMCNC: 32.4 G/DL (ref 31.5–35.7)
MCV RBC AUTO: 94.8 FL (ref 79–97)
MONOCYTES # BLD AUTO: 0.92 10*3/MM3 (ref 0.1–0.9)
MONOCYTES NFR BLD AUTO: 13.7 % (ref 5–12)
NEUTROPHILS NFR BLD AUTO: 4.34 10*3/MM3 (ref 1.7–7)
NEUTROPHILS NFR BLD AUTO: 64.4 % (ref 42.7–76)
NRBC BLD AUTO-RTO: 0 /100 WBC (ref 0–0.2)
PLATELET # BLD AUTO: 160 10*3/MM3 (ref 140–450)
PMV BLD AUTO: 11.2 FL (ref 6–12)
POTASSIUM SERPL-SCNC: 3.7 MMOL/L (ref 3.5–5.2)
PROT SERPL-MCNC: 5.5 G/DL (ref 6–8.5)
RBC # BLD AUTO: 4.07 10*6/MM3 (ref 4.14–5.8)
SODIUM SERPL-SCNC: 141 MMOL/L (ref 136–145)
WBC # BLD AUTO: 6.73 10*3/MM3 (ref 3.4–10.8)

## 2021-11-08 PROCEDURE — 97530 THERAPEUTIC ACTIVITIES: CPT

## 2021-11-08 PROCEDURE — 25010000002 ENOXAPARIN PER 10 MG: Performed by: INTERNAL MEDICINE

## 2021-11-08 PROCEDURE — 99232 SBSQ HOSP IP/OBS MODERATE 35: CPT | Performed by: INTERNAL MEDICINE

## 2021-11-08 PROCEDURE — 85025 COMPLETE CBC W/AUTO DIFF WBC: CPT | Performed by: INTERNAL MEDICINE

## 2021-11-08 PROCEDURE — 80053 COMPREHEN METABOLIC PANEL: CPT | Performed by: INTERNAL MEDICINE

## 2021-11-08 RX ORDER — LOSARTAN POTASSIUM 50 MG/1
50 TABLET ORAL DAILY
Qty: 30 TABLET | Refills: 0 | Status: SHIPPED | OUTPATIENT
Start: 2021-11-09

## 2021-11-08 RX ORDER — AMIODARONE HYDROCHLORIDE 200 MG/1
200 TABLET ORAL DAILY
Qty: 30 TABLET | Refills: 5 | Status: SHIPPED | OUTPATIENT
Start: 2021-11-16

## 2021-11-08 RX ORDER — DILTIAZEM HYDROCHLORIDE 300 MG/1
300 CAPSULE, COATED, EXTENDED RELEASE ORAL DAILY
Qty: 30 CAPSULE | Refills: 5 | Status: SHIPPED | OUTPATIENT
Start: 2021-11-08

## 2021-11-08 RX ORDER — FUROSEMIDE 20 MG/1
20 TABLET ORAL 2 TIMES DAILY
Qty: 60 TABLET | Refills: 0 | Status: SHIPPED | OUTPATIENT
Start: 2021-11-08

## 2021-11-08 RX ORDER — POTASSIUM CHLORIDE 20 MEQ/1
20 TABLET, EXTENDED RELEASE ORAL DAILY
Qty: 30 TABLET | Refills: 0 | Status: SHIPPED | OUTPATIENT
Start: 2021-11-09

## 2021-11-08 RX ORDER — METOPROLOL SUCCINATE 100 MG/1
100 TABLET, EXTENDED RELEASE ORAL EVERY 12 HOURS SCHEDULED
Qty: 60 TABLET | Refills: 0 | Status: SHIPPED | OUTPATIENT
Start: 2021-11-08

## 2021-11-08 RX ORDER — AMIODARONE HYDROCHLORIDE 200 MG/1
400 TABLET ORAL 2 TIMES DAILY
Qty: 49 TABLET | Refills: 5 | Status: SHIPPED | OUTPATIENT
Start: 2021-11-08 | End: 2021-12-06

## 2021-11-08 RX ADMIN — POTASSIUM CHLORIDE 20 MEQ: 750 TABLET, EXTENDED RELEASE ORAL at 10:16

## 2021-11-08 RX ADMIN — FUROSEMIDE 20 MG: 20 TABLET ORAL at 10:16

## 2021-11-08 RX ADMIN — DILTIAZEM HYDROCHLORIDE 90 MG: 60 TABLET, FILM COATED ORAL at 04:37

## 2021-11-08 RX ADMIN — METOPROLOL SUCCINATE 100 MG: 100 TABLET, EXTENDED RELEASE ORAL at 10:16

## 2021-11-08 RX ADMIN — ENOXAPARIN SODIUM 90 MG: 100 INJECTION, SOLUTION INTRAVENOUS; SUBCUTANEOUS at 10:16

## 2021-11-08 RX ADMIN — SODIUM CHLORIDE, PRESERVATIVE FREE 10 ML: 5 INJECTION INTRAVENOUS at 10:19

## 2021-11-08 RX ADMIN — DILTIAZEM HYDROCHLORIDE 90 MG: 60 TABLET, FILM COATED ORAL at 14:11

## 2021-11-08 RX ADMIN — LOSARTAN POTASSIUM 50 MG: 50 TABLET, FILM COATED ORAL at 11:31

## 2021-11-08 NOTE — PROGRESS NOTES
"[facility]    Patient Name: Denis Chavez  :1965  56 y.o.  LOS: 10  Encounter Provider: Denis Raymond Jr, MD      Patient Care Team:  Provider, No Known as PCP - General    Chief Complaint: Follow-up acute on chronic systolic heart failure, persistent atrial fibrillation    Interval History: Heart rate better controlled today.  No clinical complaints.       Objective   Vital Signs  Temp:  [98 °F (36.7 °C)-99.1 °F (37.3 °C)] 99.1 °F (37.3 °C)  Heart Rate:  [] 106  Resp:  [16-18] 18  BP: ()/(48-88) 109/82    Intake/Output Summary (Last 24 hours) at 2021 0943  Last data filed at 2021 0805  Gross per 24 hour   Intake 960 ml   Output --   Net 960 ml     Flowsheet Rows      First Filed Value   Admission Height 175.3 cm (69\") Documented at 10/29/2021 1717   Admission Weight 99.8 kg (220 lb) Documented at 10/29/2021 1433            Vitals reviewed.   Constitutional:       Appearance: Healthy appearance. Not in distress.   Neck:      Vascular: No JVR. JVD normal.   Pulmonary:      Effort: Pulmonary effort is normal.      Breath sounds: No wheezing. No rhonchi. No rales.   Chest:      Chest wall: Not tender to palpatation.   Cardiovascular:      PMI at left midclavicular line. Normal rate. Irregularly irregular rhythm. Normal S1. Normal S2.      Murmurs: There is no murmur.      No gallop. No click. No rub.   Pulses:     Intact distal pulses.   Edema:     Peripheral edema absent.   Abdominal:      General: Bowel sounds are normal.      Palpations: Abdomen is soft.      Tenderness: There is no abdominal tenderness.   Musculoskeletal: Normal range of motion.         General: No tenderness. Skin:     General: Skin is warm and dry.   Neurological:      General: No focal deficit present.      Mental Status: Alert and oriented to person, place and time.           Pertinent Test Results:  Results from last 7 days   Lab Units 21  0351 21  0420 21  0516 21  1442 " 11/04/21  0758 11/03/21  0316 11/03/21  0026 11/02/21  1806 11/02/21  0924 11/02/21  0923 11/02/21  0403   SODIUM mmol/L 141 143 142 137 138 143  --   --   --   --  142   POTASSIUM mmol/L 3.7 3.6 3.6 3.4* 3.6 3.8  --   --  3.5  --  3.6   CHLORIDE mmol/L 107 105 105 100 98 105  --   --   --   --  102   CO2 mmol/L 23.5 27.5 28.2 29.2* 30.7* 30.4*  --   --   --   --  28.4   BUN mg/dL 18 16 16 19 19 21*  --   --   --   --  30*   CREATININE mg/dL 0.93 0.84 0.92 1.14 0.87 1.02 1.06   < >  --    < > 1.63*   GLUCOSE mg/dL 96 90 88 136* 97 94  --   --   --   --  108*   CALCIUM mg/dL 8.2* 8.6 8.3* 8.6 8.5* 8.1*  --   --   --   --  8.0*   AST (SGOT) U/L 50* 65* 84* 126* 245* 446*  --   --   --   --  985*   ALT (SGPT) U/L 412* 548* 691* 925* 1,352* 1,781*  --   --   --   --  2,723*    < > = values in this interval not displayed.         Results from last 7 days   Lab Units 11/08/21  0351 11/07/21  0420 11/06/21  0516 11/05/21  1442 11/04/21  0758 11/03/21  0932 11/03/21  0316 11/02/21  1806 11/02/21  1806   WBC 10*3/mm3 6.73 7.02 6.71 6.92 7.03  --  6.19  --  6.86   HEMOGLOBIN g/dL 12.5* 13.2 12.7* 14.0 13.2  --  11.4*  --  12.3*   HEMATOCRIT % 38.6 39.0 37.5 40.8 40.5  --  35.8*  --  37.0*   PLATELETS 10*3/mm3 160 131* 89* 81* 46* 36* 36*   < > 38*    < > = values in this interval not displayed.     Results from last 7 days   Lab Units 11/06/21  0516 11/05/21  1442 11/04/21  0758 11/03/21  0316 11/02/21  1806 11/02/21  0558 11/01/21  1522   INR  1.18* 1.15* 1.18* 1.27* 1.26* 1.29* 1.32*   APTT seconds 28.8 30.8 27.4 30.1 30.5 42.2* 36.9*     Results from last 7 days   Lab Units 11/06/21  0516 11/05/21  1442 11/04/21  0758 11/03/21  0316   MAGNESIUM mg/dL 1.8 1.9 1.8 1.6     Results from last 7 days   Lab Units 11/02/21  0403   TRIGLYCERIDES mg/dL 102                   Medication Review:   chlorhexidine, 15 mL, Mouth/Throat, Q12H  dilTIAZem, 90 mg, Oral, Q8H  enoxaparin, 1 mg/kg, Subcutaneous, Q12H  furosemide, 20 mg, Oral,  BID  losartan, 50 mg, Oral, Q24H  metoprolol succinate XL, 100 mg, Oral, Q12H  mupirocin, , Each Nare, BID  potassium chloride, 20 mEq, Oral, Daily  sodium chloride, 10 mL, Intravenous, Q12H              Assessment/Plan   1. Persistent atrial fibrillation -rate control is better on short acting diltiazem will switch to long-acting today.  Continue beta-blocker and diltiazem with caution given recent evidence of cardiogenic shock.  Would like to switch to NOAC today.  Will discuss with hematology.  We will load the patient with amiodarone and have him seen in Dr. Mccracken's clinic with APRN in 1 week.  Ambulate the patient today and if heart rate is well controlled we will consider timing to discharge.  2. Acute on chronic congestive heart failure -await final EF from cardiac MRI.  No evidence for ischemia or inflammation.  Cardiac catheterization showed normal coronary arteries.  We will load the patient with amiodarone and tentatively plan for cardioversion in 2 to 4 weeks.  Continue Toprol and losartan.  May consider switch to Entresto in outpatient setting.  Euvolemic on current dose of diuretics.  Will need sodium and volume restriction as outpatient.  3. Hypokalemia -resolved  4. Thrombocytopenia -improved hematology following    Denis Raymond Jr, MD  [facility]  11/08/21  09:43 EST

## 2021-11-08 NOTE — CASE MANAGEMENT/SOCIAL WORK
Continued Stay Note  [facility]     Patient Name: Denis Chavez  MRN: [id number]  Today's Date: 11/8/2021    Admit Date: 10/29/2021     Discharge Plan     Row Name 11/08/21 1222       Plan    Plan Home;  Pt has NO insurance & does not qualify for Medicaid.  Will need generic medications aat dishcarge.               Discharge Codes    No documentation.               Expected Discharge Date and Time     Expected Discharge Date Expected Discharge Time    Nov 8, 2021             Nessa Sosa RN

## 2021-11-08 NOTE — DISCHARGE SUMMARY
Patient Name: Denis Chavez  : 1965  MRN: [id number]    Date of Admission: 10/29/2021  Date of Discharge:  2021  Primary Care Physician: Provider, No Known      Chief Complaint:   Abdominal Pain and Nausea      Discharge Diagnoses     Active Hospital Problems    Diagnosis  POA   • **Cardiogenic shock (HCC) [R57.0]  Yes   • Acute deep vein thrombosis (DVT) of right upper extremity (HCC) [I82.621]  Unknown   • DVT, lower extremity, proximal, acute, bilateral (HCC) [I82.4Y3]  Unknown   • Pseudoaneurysm of femoral artery following procedure (HCC) [T81.718A, I72.4]  Unknown   • Other specified anemias [D64.89]  Unknown   • 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 Chavez is a 56 y.o. male who presented to [facility] initially complaining of fatigue, shortness of breath and abdominal pain.  Please see the admitting history and physical for further details.  He was found to have hypotension and possible sepsis and was admitted to the intensive care unit for further evaluation and treatment.  He was also found to be in atrial fibrillation with rapid ventricular response and unfortunately underwent cardiac arrest while still in the emergency department.  Fortunately he was resuscitated and maintained on life support including Impella placement.  He was seen by nephrology and gastroenterology who felt obviously was having issues with hypoperfusion.  Fortunately with treatment his kidney and liver function has returned to normal.  He  has had multiple other issues during his hospital stay.  POOJA demonstrated ejection fraction 26 to 30% with severe dilation of right ventricle, moderate mitral regurgitation.  There was consideration for cardiac transplant but does not appear feasible due to lack of social support.  Cardiac catheterization showed normal coronary arteries.  He underwent cardiac MRI which demonstrated global hypokinesis of the left ventricle.  EF calculation still pending.  He was treated by hematology for coagulopathy with DIC.  He developed right upper extremity DVT and bilateral lower extremity DVT.  He developed pseudoaneurysm right femoral artery following catheterization for which vascular surgery evaluated and is recommending managing this without intervention for now.  With resolution of his coagulopathy he was started on Lovenox.  He is now being transitioned to Eliquis for blood clots and atrial fibrillation.  Cardiology has made adjustments in his medications will need follow-up with him very closely after discharge.  He has been loaded on amiodarone and will continue Toprol, losartan and diltiazem.      Day of Discharge     Subjective:  Feels overall much better and very eager to go home.  No new complaints today.    Physical Exam:  Temp:  [98.7 °F (37.1 °C)-99.1 °F (37.3 °C)] 99.1 °F (37.3 °C)  Heart Rate:  [] 101  Resp:  [16-18] 17  BP: ()/(70-88) 108/70  Body mass index is 29.51 kg/m².  Physical Exam  Vitals and nursing note reviewed.   Constitutional:       General: He is not in acute distress.     Appearance: Normal appearance.   Neck:      Vascular: No JVD.      Trachea: No tracheal deviation.   Cardiovascular:      Rate and Rhythm: Regular rhythm. Tachycardia present.      Heart sounds: Normal heart sounds.   Pulmonary:      Effort: Pulmonary effort is normal. No respiratory distress.      Breath sounds: Normal breath sounds.   Abdominal:      General: Bowel sounds are normal.      Palpations: Abdomen is  soft.      Tenderness: There is no abdominal tenderness.   Skin:     General: Skin is warm and dry.   Neurological:      General: No focal deficit present.      Mental Status: He is alert and oriented to person, place, and time.   Psychiatric:         Behavior: Behavior normal. Behavior is cooperative.         Consultants     Consult Orders (all) (From admission, onward)     Start     Ordered    11/05/21 0855  Cardiac Electrophysiologist Inpatient Consult  Once        Specialty:  Cardiac Electrophysiology  Provider:  Jose Salcedo MD    11/05/21 0855    11/04/21 1250  Inpatient Vascular Surgery Consult  Once        Specialty:  Vascular Surgery  Provider:  Lionel Marques MD    11/04/21 1249    11/04/21 1248  Inpatient Internal Medicine Consult  Once        Specialty:  Internal Medicine  Provider:  Claudio Murcia MD    11/04/21 1248    11/01/21 1051  Inpatient Consult to Nutrition Services  Once        Provider:  (Not yet assigned)    11/01/21 1052    10/30/21 1206  Hematology & Oncology Inpatient Consult  Once        Specialty:  Hematology and Oncology  Provider:  Owen Hart II, MD    10/30/21 1205    10/30/21 1148  Inpatient Gastroenterology Consult  Once        Specialty:  Gastroenterology  Provider:  Wellington Pressley MD    10/30/21 1148    10/29/21 1403  Pulmonology (on-call MD unless specified)  Once        Specialty:  Pulmonary Disease  Provider:  (Not yet assigned)    10/29/21 1402    10/29/21 1403  Cardiology (on-call MD unless specified)  Once        Specialty:  Cardiology  Provider:  (Not yet assigned)    10/29/21 1402              Procedures     Impella Removal      Imaging Results (All)     Procedure Component Value Units Date/Time    MRI Cardiac Function Complete With & Without Morphology [[id number]] Resulted: 11/08/21 1130     Updated: 11/08/21 1132    Narrative:      Cardiac MRI  11/08/21    The pulmonic valve is normal in structure and function.  The tricuspid valve is normal  in structure and function.  The mitral valve is normal but there is mitral regurgitation noted.  The aortic valve is normal in structure and function.    There is severe global hypokinesis of the left ventricle.  There is hypokinesis of the right ventricle.  There is moderate biatrial enlargement.  There is epicardial fat noted. There is no significant pericardial   effusion.   The aortic root is normal in size.     There is normal perfusion of the myocardium.    The coronary arteries are visualized and run in their expected course.    The right coronary artery comes off the right coronary cusp.  The left   main coronary artery comes off the left coronary cusp and bifurcates into   the left anterior descending artery and circumflex coronary artery.     There is no delayed enhancement.             Conclusion:  1. Severe global hypokinesis of the left ventricle.  2.  Right ventricle is severely hypokinetic.  3.  Moderate biatrial enlargement  4.  Mitral valve regurgitation is noted  5.  No evidence of delayed enhancement or infiltrative cardiomyopathy.  6.  Normal perfusion of the myocardium.      Lulu Bojorquez MD  11/08/21      XR Chest 1 View [[id number]] Collected: 10/31/21 0513     Updated: 10/31/21 0519    Narrative:      SINGLE VIEW OF THE CHEST     HISTORY: Oxygen desaturation     COMPARISON: 10/30/2021     FINDINGS:  Tubes and lines are stable in position. Cardiomegaly is present. There  is vascular congestion. No pneumothorax is identified. No definite  effusion is seen.       Impression:      No significant interval change.     This report was finalized on 10/31/2021 5:16 AM by Dr. Henny Galvez M.D.       XR Chest 1 View [[id number]] Collected: 10/30/21 1839     Updated: 10/30/21 1941    Narrative:      STAT PORTABLE CHEST 10/30/2021     CLINICAL HISTORY: Oxygen desaturation.     COMPARISON: This is correlated to a portable chest x-ray earlier this  morning, 10/30/2021 at 3:45 AM.     FINDINGS: An  endotracheal tube is in place and its tip projects over the  thoracic tracheal air column 6 cm above the anel in good position. A  right internal jugular pulmonary artery catheter is in place and its  distal tip is in the proximal right pulmonary artery, unchanged in  position and in good position. There has been interval placement of an  Impala catheter that courses up the descending thoracic aorta over the  aortic arch and its distal hook is in the expected location of the left  ventricle. The cardiomediastinal silhouette is mildly enlarged. There is  perhaps mild pulmonary vasculature engorgement. There is a very subtle  hazy opacity in the perihilar regions bilaterally in a symmetric  fashion. Very subtle, very faint early pulmonary alveolar edema is  difficult to exclude and should be correlated clinically. The remainder  of the lungs are clear. The costophrenic angles are sharp.      The results were communicated to Amy, the nurse in the ICU taking care  of the patient, by telephone 10/30/2021 at 6:10 pm.     This report was finalized on 10/30/2021 7:38 PM by Dr. Vincent Galicia M.D.       XR Chest 1 View [[id number]] Collected: 10/30/21 0710     Updated: 10/30/21 0818    Narrative:      ONE VIEW PORTABLE CHEST AT 3:45 AM     HISTORY: CHF. Pleural effusions.     FINDINGS:  There is cardiomegaly with some minimal haziness in the upper  right chest which may relate to pleural fluid layering posteriorly as  suggested on the CT scan performed yesterday. The haziness is slightly  more prominent when compared to the portable chest x-ray performed  yesterday at 8:24 PM. There is improvement in similar haziness that was  present on the left.     A PA line ends in the right main pulmonary artery and ET tube ends 7 cm  above the anel.     This report was finalized on 10/30/2021 8:15 AM by Dr. Alex Clark M.D.       XR Chest 1 View [[id number]] Collected: 10/29/21 2047     Updated: 10/29/21 2052    Narrative:       PORTABLE CHEST 10/29/2021 8:24 PM     CLINICAL HISTORY: Evaluate ET tube placement and central line placement.     Compared to the previous chest dated 10/29/2021 at 1159 hours.     In the interval, an endotracheal tube has been inserted and appears in  satisfactory position with its tip at the level of the clavicular heads.  A right internal jugular Clitherall-Gwyn catheter has also been inserted and  has its tip in the right main pulmonary artery or proximal aspect of the  interlobar pulmonary artery. The lungs are fairly well-expanded and  appear free of infiltrates. There is no pneumothorax. There are no  pleural effusions. The heart is moderately enlarged and unchanged.     IMPRESSIONS: Moderate cardiomegaly. ET tube and Clitherall-Gwyn catheter in  satisfactory position. No acute process is identified.     This report was finalized on 10/29/2021 8:49 PM by Dr. Dave Mcdonough M.D.       CT Chest Without Contrast Diagnostic [[id number]] Collected: 10/29/21 1521     Updated: 10/29/21 1626    Narrative:      CT CHEST, ABDOMEN, AND PELVIS WITHOUT CONTRAST.     HISTORY: 56-year-old male with acute renal failure. Abnormal LFTs.  Tachycardia and arrhythmia.     TECHNIQUE: Radiation dose reduction techniques were utilized, including  automated exposure control and exposure modulation based on body size.   3 mm images were obtained through the chest, abdomen, and pelvis without  the administration of IV contrast. There are no previous CTs for  comparison.     FINDINGS:  CHEST: There are small-moderate-sized bilateral pleural effusions and  there is compressive atelectasis at the lower lobes. There is no  interstitial edema. The heart is enlarged. There are a few tiny coronary  artery calcifications. There is no pericardial effusion. There is no  lymphadenopathy within the chest.     ABDOMEN/PELVIS: The liver is enlarged and there is a tiny amount of  perihepatic fluid. There is also a tiny amount of fluid surrounding  the  gallbladder and there is a very small amount of free fluid within the  lower abdomen and pelvis. There is focal fatty infiltration at the  medial hepatic segment. The gallbladder is distended, but does not have  a thickened wall. Splenic size is normal. Noncontrasted pancreas,  adrenals, and kidneys appear unremarkable other than a single tiny  nonobstructing stone at the lower pole of the left kidney. No ureteral  stones are seen and there is no hydronephrosis bilaterally. There is a  fairly large left inguinal hernia containing a long segment of sigmoid  colon as well as the left aspect of the bladder dome without obstruction  or incarceration. The rest of the small bowel appears unremarkable.  Appendix appears within normal limits.       Impression:      1. The findings suggest passive hepatic congestion with a tiny amount of  free fluid and 3rd spacing of fluid within the body wall. There are also  small-moderate-sized bilateral pleural effusions.  2. Moderately large left inguinal hernia containing a long segment of  sigmoid colon and bladder dome without obstruction or incarceration.  3. Cardiomegaly.     Discussed with Dr. Gillespie.     This report was finalized on 10/29/2021 4:23 PM by Dr. Misti Georges M.D.       CT Abdomen Pelvis Without Contrast [[id number]] Collected: 10/29/21 1521     Updated: 10/29/21 1626    Narrative:      CT CHEST, ABDOMEN, AND PELVIS WITHOUT CONTRAST.     HISTORY: 56-year-old male with acute renal failure. Abnormal LFTs.  Tachycardia and arrhythmia.     TECHNIQUE: Radiation dose reduction techniques were utilized, including  automated exposure control and exposure modulation based on body size.   3 mm images were obtained through the chest, abdomen, and pelvis without  the administration of IV contrast. There are no previous CTs for  comparison.     FINDINGS:  CHEST: There are small-moderate-sized bilateral pleural effusions and  there is compressive atelectasis at the lower  lobes. There is no  interstitial edema. The heart is enlarged. There are a few tiny coronary  artery calcifications. There is no pericardial effusion. There is no  lymphadenopathy within the chest.     ABDOMEN/PELVIS: The liver is enlarged and there is a tiny amount of  perihepatic fluid. There is also a tiny amount of fluid surrounding the  gallbladder and there is a very small amount of free fluid within the  lower abdomen and pelvis. There is focal fatty infiltration at the  medial hepatic segment. The gallbladder is distended, but does not have  a thickened wall. Splenic size is normal. Noncontrasted pancreas,  adrenals, and kidneys appear unremarkable other than a single tiny  nonobstructing stone at the lower pole of the left kidney. No ureteral  stones are seen and there is no hydronephrosis bilaterally. There is a  fairly large left inguinal hernia containing a long segment of sigmoid  colon as well as the left aspect of the bladder dome without obstruction  or incarceration. The rest of the small bowel appears unremarkable.  Appendix appears within normal limits.       Impression:      1. The findings suggest passive hepatic congestion with a tiny amount of  free fluid and 3rd spacing of fluid within the body wall. There are also  small-moderate-sized bilateral pleural effusions.  2. Moderately large left inguinal hernia containing a long segment of  sigmoid colon and bladder dome without obstruction or incarceration.  3. Cardiomegaly.     Discussed with Dr. Gillespie.     This report was finalized on 10/29/2021 4:23 PM by Dr. Misti Georges M.D.       XR Chest 1 View [[id number]] Collected: 10/29/21 1227     Updated: 10/29/21 1230    Narrative:      CHEST SINGLE VIEW     HISTORY: Shortness of air     COMPARISON: None     FINDINGS: The heart size is enlarged. There are several subcentimeter  pulmonary nodules that appear calcified and are consistent with  calcified granulomas. There are no previous exams for  comparison. Lungs  appear clear of focal airspace disease and there is no evidence for  pulmonary edema or pleural effusion or infiltrate. Cardiac monitoring  leads are noted.       Impression:      Subcentimeter bilateral calcified nodules are present  consistent with calcified granulomas. Heart size is enlarged. There is  no convincing evidence for active disease in the chest.     This report was finalized on 10/29/2021 12:27 PM by Dr. Kip Garcia M.D.           Results for orders placed during the hospital encounter of 10/29/21    Duplex Groin Pseudoaneurysm Bilateral CAR    Interpretation Summary  · Normal right external iliac, common and deep femoral arteries.  · Patent superficial femoral artery with 1.1 cm postcatheterization pseudoaneurysm, which appears about 50% thrombosed.    Results for orders placed during the hospital encounter of 10/29/21    Adult Transthoracic Echo Limited W/ Cont if Necessary Per Protocol    Interpretation Summary  · Left ventricular systolic function is low normal.  · The following left ventricular wall segments are hypokinetic: basal inferoseptal, mid inferoseptal, mid anteroseptal and basal inferoseptal.    Pertinent Labs     Results from last 7 days   Lab Units 11/08/21  0351 11/07/21  0420 11/06/21  0516 11/05/21  1442   WBC 10*3/mm3 6.73 7.02 6.71 6.92   HEMOGLOBIN g/dL 12.5* 13.2 12.7* 14.0   PLATELETS 10*3/mm3 160 131* 89* 81*     Results from last 7 days   Lab Units 11/08/21  0351 11/07/21  0420 11/06/21  0516 11/05/21  1442   SODIUM mmol/L 141 143 142 137   POTASSIUM mmol/L 3.7 3.6 3.6 3.4*   CHLORIDE mmol/L 107 105 105 100   CO2 mmol/L 23.5 27.5 28.2 29.2*   BUN mg/dL 18 16 16 19   CREATININE mg/dL 0.93 0.84 0.92 1.14   GLUCOSE mg/dL 96 90 88 136*   EGFR IF NONAFRICN AM mL/min/1.73 84 94 85 66     Results from last 7 days   Lab Units 11/08/21  0351 11/07/21  0420 11/06/21  0516 11/05/21  1442   ALBUMIN g/dL 3.40* 3.40* 3.10* 3.40*   BILIRUBIN mg/dL 1.9* 2.0* 2.2*  2.2*   ALK PHOS U/L 80 84 84 113   AST (SGOT) U/L 50* 65* 84* 126*   ALT (SGPT) U/L 412* 548* 691* 925*     Results from last 7 days   Lab Units 11/08/21  0351 11/07/21  0420 11/06/21  0516 11/05/21  1442 11/04/21  0758 11/04/21  0758 11/03/21  1951 11/03/21  0932 11/03/21  0316 11/03/21  0316   CALCIUM mg/dL 8.2* 8.6 8.3* 8.6   < > 8.5*  --   --    < > 8.1*   ALBUMIN g/dL 3.40* 3.40* 3.10* 3.40*   < > 3.30*  --    < >   < > 2.90*   MAGNESIUM mg/dL  --   --  1.8 1.9  --  1.8  --   --   --  1.6   PHOSPHORUS mg/dL  --  2.9  --  2.4*  --  2.7 2.7  --   --  2.3*    < > = values in this interval not displayed.           Results from last 7 days   Lab Units 11/02/21  0403   TRIGLYCERIDES mg/dL 102             Test Results Pending at Discharge       Discharge Details        Discharge Medications      New Medications      Instructions Start Date   amiodarone 200 MG tablet  Commonly known as: PACERONE   400 mg, Oral, 2 Times Daily      amiodarone 200 MG tablet  Commonly known as: PACERONE   200 mg, Oral, Daily   Start Date: November 16, 2021     apixaban 5 MG tablet tablet  Commonly known as: ELIQUIS   Take 10 mg twice daily for 7 days then 5 mg twice daily thereafter.      dilTIAZem  MG 24 hr capsule  Commonly known as: Cardizem CD   300 mg, Oral, Daily      furosemide 20 MG tablet  Commonly known as: LASIX   20 mg, Oral, 2 Times Daily      losartan 50 MG tablet  Commonly known as: COZAAR   50 mg, Oral, Daily   Start Date: November 9, 2021     metoprolol succinate  MG 24 hr tablet  Commonly known as: TOPROL-XL   100 mg, Oral, Every 12 Hours Scheduled      potassium chloride 20 MEQ CR tablet  Commonly known as: K-DUR,KLOR-CON   20 mEq, Oral, Daily   Start Date: November 9, 2021            No Known Allergies    Discharge Disposition:  Home or Self Care      Discharge Diet:  Diet Order   Procedures   • Diet Regular; Cardiac       Discharge Activity:   Activity Instructions     Activity as Tolerated            CODE  STATUS:    Code Status and Medical Interventions:   Ordered at: 10/29/21 1508     Code Status (Patient has no pulse and is not breathing):    CPR (Attempt to Resuscitate)     Medical Interventions (Patient has pulse or is breathing):    Full       Future Appointments   Date Time Provider Department Center   11/16/2021 10:00 AM Addie Diallo APRN [facility]     Additional Instructions for the Follow-ups that You Need to Schedule     Discharge Follow-up with Specialty: Please follow up in our office in a week with Addie; 1 Week   As directed      Specialty: Please follow up in our office in a week with Addie    Follow Up: 1 Week            Follow-up Information     Provider, No Known .    Contact information:  [facility] [id number] [phone]             Alan Prince Jr., MD Follow up in 1 month(s).    Specialty: Vascular Surgery  Why: with duplex scan of right groin to follow-up small SFA pseudoaneurysm  Contact information:  [address] [facility]  [address]  [facility] [id number] [phone]             Denis Raymond Jr., MD Follow up in 1 week(s).    Specialties: Cardiology, Interventional Cardiology  Contact information:  [address] [facility]  [address]  [place] [address]  [phone]             Claudio Lambert MD Follow up in 1 week(s).    Specialty: Nephrology  Contact information:  [address]  [address]  [facility] [address]  [phone]                         Additional Instructions for the Follow-ups that You Need to Schedule     Discharge Follow-up with Specialty: Please follow up in our office in a week with Addie; 1 Week   As directed      Specialty: Please follow up in our office in a week with Addie    Follow Up: 1 Week           Time Spent on Discharge:  Greater than 30 minutes      Edy Khan MD  [facility]  11/08/21  14:52 EST

## 2021-11-08 NOTE — THERAPY DISCHARGE NOTE
Acute Care - Physical Therapy Treatment Note/Discharge  [facility]     Patient Name: Denis Chavez  : 1965  MRN: [id number]  Today's Date: 2021                Admit Date: 10/29/2021    Visit Dx:    ICD-10-CM ICD-9-CM   1. Atrial fibrillation with rapid ventricular response (HCC)  I48.91 427.31   2. KRISHNA (acute kidney injury) (HCC)  N17.9 584.9   3. Hyperkalemia  E87.5 276.7   4. Right upper quadrant abdominal pain  R10.11 789.01   5. Elevated LFTs  R79.89 790.6   6. Cardiogenic shock (HCC)  R57.0 785.51     Patient Active Problem List   Diagnosis   • Atrial fibrillation with rapid ventricular response (HCC)   • Right upper quadrant abdominal pain   • Shock liver   • Mitral insufficiency, acute   • Cardiogenic shock (HCC)   • Coagulopathy (HCC)   • Thrombocytopenia (HCC)   • Other specified anemias   • Hypofibrinogenemia (HCC)   • Acute deep vein thrombosis (DVT) of right upper extremity (HCC)   • DVT, lower extremity, proximal, acute, bilateral (HCC)   • Pseudoaneurysm of femoral artery following procedure (HCC)     History reviewed. No pertinent past medical history.  Past Surgical History:   Procedure Laterality Date   • CARDIAC CATHETERIZATION Right 10/29/2021    Procedure: Left Heart Cath;  Surgeon: Get Cunha MD;  Location: [facility] INVASIVE LOCATION;  Service: Cardiology;  Laterality: Right;   • CARDIAC CATHETERIZATION N/A 10/29/2021    Procedure: Coronary angiography;  Surgeon: Get Cunha MD;  Location: [facility] CATH INVASIVE LOCATION;  Service: Cardiology;  Laterality: N/A;   • CARDIAC ELECTROPHYSIOLOGY PROCEDURE N/A 10/30/2021    Procedure: IMPELLA INSERTION;  Surgeon: Get Cunha MD;  Location: [facility] CATH INVASIVE LOCATION;  Service: Cardiology;  Laterality: N/A;   • CARDIAC ELECTROPHYSIOLOGY PROCEDURE N/A 10/30/2021    Procedure: Cardioversion;  Surgeon: Get Cunha MD;  Location: [facility] CATH INVASIVE LOCATION;  Service: Cardiology;  Laterality: N/A;   • CARDIAC  ELECTROPHYSIOLOGY PROCEDURE N/A 11/2/2021    Procedure: Impella Removal;  Surgeon: Get Cunha MD;  Location: [facility] CATH INVASIVE LOCATION;  Service: Cardiology;  Laterality: N/A;       PT Assessment (last 12 hours)     PT Evaluation and Treatment     Row Name             Wound 11/02/21 1630 Right throat Puncture    Wound - Properties Group Placement Date: 11/02/21  -NIC Placement Time: 1630  -NIC Present on Hospital Admission: N  -NIC Side: Right  -NIC Location: throat  -NIC Primary Wound Type: Puncture  -NIC Additional Comments: Central line removal  -NIC     Retired Wound - Properties Group Date first assessed: 11/02/21  -NIC Time first assessed: 1630  -NIC Present on Hospital Admission: N  -NIC Side: Right  -NIC Location: throat  -NIC Primary Wound Type: Puncture  -NIC Additional Comments: Central line removal  -NIC           User Key  (r) = Recorded By, (t) = Taken By, (c) = Cosigned By    Initials Name Provider Type    Ramandeep Dimas RN Registered Nurse                Physical Therapy Education                 Title: PT OT SLP Therapies (Done)     Topic: Physical Therapy (Done)     Point: Mobility training (Done)     Learning Progress Summary           Patient Eager, E,TB,D, VU,DU by  at 11/8/2021 1048    Comment: Denies concern re d/c.    Acceptance, E,D, DU,VU by  at 11/6/2021 1059    Acceptance, E, VU,NR by LB at 11/5/2021 1520    Acceptance, E, VU,NR by CF at 11/4/2021 1545                   Point: Home exercise program (Done)     Learning Progress Summary           Patient Acceptance, E,D, DU,VU by  at 11/6/2021 1059                   Point: Body mechanics (Done)     Learning Progress Summary           Patient Acceptance, E,D, DU,VU by  at 11/6/2021 1059    Acceptance, E, VU,NR by CF at 11/4/2021 1545                   Point: Precautions (Done)     Learning Progress Summary           Patient Eager, E,TB,D, VU,DU by  at 11/8/2021 1048    Comment: Denies concern re d/c.    Acceptance, E,D, DU,VU by   at 11/6/2021 1059    Acceptance, E, VU,NR by CF at 11/4/2021 1545                               User Key     Initials Effective Dates Name Provider Type Discipline    LB 03/07/18 -  Rajwinder Neil PTA Physical Therapy Assistant PT     06/16/21 -  Marva Viera, GAYLE Physical Therapist PT     07/02/20 -  Talon Siddiqui, GAYLE DPT Physical Therapist PT    CF 06/16/21 -  Emma Hinds, GAYLE Physical Therapist PT                PT Recommendation and Plan  Anticipated Discharge Disposition (PT): home, home with assist  Plan of Care Reviewed With: patient  Outcome Summary: Pt feels he is close to baseline other than cardiac issues.  Feels steady with gait and stairs and denies any mobility concerns.  Pt will be d/c to amb with sup of nurses as tolerated.         Time Calculation:    PT Charges     Row Name 11/08/21 1049             Time Calculation    Start Time 0900  -      Stop Time 0920  -      Time Calculation (min) 20 min  -KP      PT Received On 11/08/21  -            User Key  (r) = Recorded By, (t) = Taken By, (c) = Cosigned By    Initials Name Provider Type     Marva Viera, PT Physical Therapist              Therapy Charges for Today     Code Description Service Date Service Provider Modifiers Qty    32106965972 HC PT THERAPEUTIC ACT EA 15 MIN 11/8/2021 Marva Viera, PT GP 3      Patient was intermittently wearing a face mask during this therapy encounter. Therapist used appropriate personal protective equipment including mask and gloves.  Mask used was standard procedure mask. Appropriate PPE was worn during the entire therapy session. Hand hygiene was completed before and after therapy session. Patient is not in enhanced droplet precautions.         PT G-Codes  Outcome Measure Options: AM-PAC 6 Clicks Basic Mobility (PT)  AM-PAC 6 Clicks Score (PT): 24    PT Discharge Summary  Anticipated Discharge Disposition (PT): home, home with assist  Reason for Discharge: All goals  achieved  Outcomes Achieved: Able to achieve all goals within established timeline  Discharge Destination: Home, Home with assist    Marva Viera, PT  11/8/2021

## 2021-11-08 NOTE — PLAN OF CARE
Goal Outcome Evaluation:  Plan of Care Reviewed With: patient           Outcome Summary: Pt feels he is close to baseline other than cardiac issues.  Feels steady with gait and stairs and denies any mobility concerns.  Pt will be d/c to amb with sup of nurses as tolerated.

## 2021-11-08 NOTE — PROGRESS NOTES
Name: Denis Chavez ADMIT: 10/29/2021   : 1965  PCP: Provider, No Known    MRN: [id number] LOS: 10 days   AGE/SEX: 56 y.o. male  ROOM: [address]    [place], Subsequent Hospital Care    56 y.o. male with radial artery occlusion, upper extremity DVT and post catheterization femoral pseudoaneurysm following cardiac arrest and high risk PCI.  Tachyarrhythmias yesterday evening, with heart rate ranging 121-187.  Patient is doing better today.  He expects to go home.  Right hand remains asymptomatic.  No upper extremity edema.  No groin pain.  Ambulating in [place] without difficulty.  Arixtra transition to low molecular weight heparin.  Anticipate DOAC.    Scheduled Medications:   chlorhexidine, 15 mL, Mouth/Throat, Q12H  dilTIAZem, 90 mg, Oral, Q8H  enoxaparin, 1 mg/kg, Subcutaneous, Q12H  furosemide, 20 mg, Oral, BID  losartan, 50 mg, Oral, Q24H  metoprolol succinate XL, 100 mg, Oral, Q12H  mupirocin, , Each Nare, BID  potassium chloride, 20 mEq, Oral, Daily  sodium chloride, 10 mL, Intravenous, Q12H    Vital Signs  Vital Signs Patient Vitals for the past 24 hrs:   BP Temp Temp src Pulse Resp SpO2 Weight   21 1051 108/70 99.1 °F (37.3 °C) Oral 101 17 98 % --   21 0721 109/82 99.1 °F (37.3 °C) Oral 106 18 95 % --   21 0555 -- -- -- -- -- -- 90.7 kg (199 lb 14.4 oz)   21 0430 111/83 -- -- 98 18 98 % --   21 0142 107/88 -- -- -- -- -- --   21 95/81 -- -- 95 -- 97 % --   21 -- -- -- 102 -- 97 % --   21 -- -- -- 98 -- 96 % --   21 -- -- -- 90 -- 97 % --   21 -- -- -- 101 -- 96 % --   21 -- -- -- 88 -- 96 % --   21 -- -- -- 91 -- 98 % --   21 -- -- -- 106 -- 97 % --   21 -- -- -- 100 -- 97 % --   21 -- -- -- 112 -- 96 % --   21 -- -- -- 106 -- 97 % --   21 -- -- -- 113 -- 97 % --   21 -- -- -- 106 -- 97 % --   21 -- -- --  92 -- -- --   11/07/21 2144 -- -- -- 105 -- -- --   11/07/21 2143 -- -- -- 112 -- -- --   11/07/21 2142 -- -- -- (!) 125 -- -- --   11/07/21 2141 -- -- -- (!) 127 -- -- --   11/07/21 2140 -- -- -- (!) 233 -- (!) 83 % --   11/07/21 2138 -- -- -- 99 -- 97 % --   11/07/21 2137 -- -- -- 102 -- 97 % --   11/07/21 2136 -- -- -- 101 -- 96 % --   11/07/21 2134 -- -- -- 107 -- 96 % --   11/07/21 2133 -- -- -- 97 -- 96 % --   11/07/21 2132 -- -- -- 102 -- 96 % --   11/07/21 2131 -- -- -- 108 -- 96 % --   11/07/21 2129 -- -- -- 90 -- 97 % --   11/07/21 2128 -- -- -- 101 -- 97 % --   11/07/21 2127 -- -- -- 109 -- 97 % --   11/07/21 2126 -- -- -- 117 -- 97 % --   11/07/21 2125 -- -- -- 103 -- -- --   11/07/21 2123 -- -- -- 105 -- -- --   11/07/21 2122 -- -- -- (!) 123 -- (!) 87 % --   11/07/21 2121 -- -- -- 95 -- 95 % --   11/07/21 2120 -- -- -- 86 -- 98 % --   11/07/21 2119 -- -- -- 108 -- 98 % --   11/07/21 2117 -- -- -- 102 -- 97 % --   11/07/21 2116 -- -- -- 120 -- 98 % --   11/07/21 2115 -- -- -- 106 -- 99 % --   11/07/21 2114 -- -- -- 102 -- -- --   11/07/21 2113 -- -- -- 95 -- 97 % --   11/07/21 2112 -- -- -- 99 -- 98 % --   11/07/21 2111 -- -- -- 106 -- 97 % --   11/07/21 2110 -- -- -- 97 -- 99 % --   11/07/21 2108 -- -- -- 99 -- 99 % --   11/07/21 2107 -- -- -- 103 -- 97 % --   11/07/21 2106 -- -- -- 103 -- 98 % --   11/07/21 2105 -- -- -- 102 -- 98 % --   11/07/21 2103 -- -- -- 102 -- 100 % --   11/07/21 2102 -- -- -- 107 -- 97 % --   11/07/21 2101 -- -- -- 104 -- 99 % --   11/07/21 2059 (!) 86/79 -- -- 74 18 99 % --   11/07/21 2058 -- -- -- 111 -- 98 % --   11/07/21 2056 -- -- -- 110 -- 98 % --   11/07/21 2055 -- -- -- 102 -- 98 % --   11/07/21 2054 -- -- -- 95 -- 98 % --   11/07/21 2053 -- -- -- 112 -- 98 % --   11/07/21 2051 -- -- -- 99 -- 98 % --   11/07/21 2050 -- -- -- 109 -- 98 % --   11/07/21 2049 -- -- -- 112 -- 99 % --   11/07/21 2048 -- -- -- 103 -- 97 % --   11/07/21 2046 -- -- -- 114 -- 99 % --   11/07/21  2045 -- -- -- 115 -- 99 % --   11/07/21 2044 -- -- -- 99 -- 98 % --   11/07/21 2043 -- -- -- 104 -- 99 % --   11/07/21 2041 -- -- -- 110 -- 100 % --   11/07/21 2040 -- -- -- 108 -- 99 % --   11/07/21 2039 -- -- -- 116 -- 99 % --   11/07/21 2038 -- -- -- 107 -- 98 % --   11/07/21 2037 -- -- -- 105 -- -- --   11/07/21 2036 -- -- -- 99 -- 98 % --   11/07/21 2035 -- -- -- 106 -- 98 % --   11/07/21 2034 -- -- -- 107 -- 98 % --   11/07/21 2033 -- -- -- 110 -- 99 % --   11/07/21 2031 -- -- -- 120 -- -- --   11/07/21 2030 -- -- -- 115 -- -- --   11/07/21 2029 -- -- -- (!) 128 -- -- --   11/07/21 2028 -- -- -- (!) 142 -- -- --   11/07/21 2026 -- -- -- (!) 125 -- -- --   11/07/21 2025 -- -- -- (!) 134 -- -- --   11/07/21 2024 -- -- -- (!) 148 -- -- --   11/07/21 2022 -- -- -- (!) 136 -- -- --   11/07/21 2021 -- -- -- 112 -- (!) 81 % --   11/07/21 2020 -- -- -- (!) 140 -- (!) 78 % --   11/07/21 2019 -- -- -- (!) 124 -- (!) 77 % --   11/07/21 2018 -- -- -- (!) 150 -- (!) 88 % --   11/07/21 2016 -- -- -- -- -- (!) 82 % --   11/07/21 2015 -- -- -- 113 -- (!) 76 % --   11/07/21 2014 -- -- -- (!) 131 -- 90 % --   11/07/21 2013 -- -- -- (!) 125 -- (!) 84 % --   11/07/21 2012 -- -- -- (!) 129 -- (!) 82 % --   11/07/21 2011 -- -- -- (!) 135 -- -- --   11/07/21 2010 -- -- -- (!) 137 -- (!) 83 % --   11/07/21 2009 -- -- -- (!) 129 -- (!) 88 % --   11/07/21 2008 -- -- -- (!) 135 -- 91 % --   11/07/21 2006 -- -- -- (!) 122 -- (!) 87 % --   11/07/21 2005 -- -- -- (!) 126 -- (!) 88 % --   11/07/21 2004 -- -- -- (!) 125 -- 90 % --   11/07/21 2003 -- -- -- (!) 130 -- 91 % --   11/07/21 2001 -- -- -- 113 -- 98 % --   11/07/21 2000 -- -- -- (!) 126 -- (!) 84 % --   11/07/21 1959 -- -- -- (!) 157 -- 96 % --   11/07/21 1957 -- -- -- (!) 152 -- -- --   11/07/21 1956 -- -- -- (!) 187 -- -- --   11/07/21 1955 -- -- -- (!) 177 -- -- --   11/07/21 1954 -- -- -- (!) 140 -- -- --   11/07/21 1951 -- -- -- (!) 121 -- -- --   11/07/21 1950 -- -- -- (!) 136  -- -- --   11/07/21 1949 -- -- -- 120 -- -- --   11/07/21 1948 -- -- -- 114 -- 96 % --   11/07/21 1946 -- -- -- 106 -- 99 % --   11/07/21 1945 -- -- -- 113 -- 95 % --   11/07/21 1944 -- -- -- (!) 127 -- 98 % --   11/07/21 1943 -- -- -- (!) 122 -- 97 % --   11/07/21 1942 -- -- -- 105 -- 98 % --   11/07/21 1941 -- -- -- 110 -- 98 % --   11/07/21 1939 -- -- -- 104 -- 97 % --   11/07/21 1938 -- -- -- 106 -- 98 % --   11/07/21 1937 -- -- -- 107 -- 95 % --   11/07/21 1936 -- -- -- 107 -- 95 % --   11/07/21 1935 -- -- -- 105 -- 95 % --   11/07/21 1934 -- -- -- 107 -- -- --   11/07/21 1933 -- -- -- 91 -- 95 % --   11/07/21 1932 -- -- -- 114 -- 97 % --   11/07/21 1931 -- -- -- 111 -- 97 % --   11/07/21 1929 -- -- -- 110 -- 95 % --   11/07/21 1928 -- -- -- 105 -- 97 % --   11/07/21 1927 -- -- -- 107 -- 97 % --   11/07/21 1926 -- -- -- 108 -- (!) 89 % --   11/07/21 1925 -- -- -- 91 -- 95 % --   11/07/21 1923 -- -- -- (!) 123 -- 99 % --   11/07/21 1922 -- -- -- 111 -- 98 % --   11/07/21 1921 -- -- -- 94 -- 96 % --   11/07/21 1920 -- -- -- 94 -- 98 % --   11/07/21 1919 -- -- -- 110 -- 97 % --   11/07/21 1917 -- -- -- 110 -- 98 % --   11/07/21 1916 -- -- -- 105 -- 97 % --   11/07/21 1915 -- -- -- 111 -- 95 % --   11/07/21 1914 -- -- -- 111 -- -- --   11/07/21 1913 -- -- -- 109 -- 97 % --   11/07/21 1912 -- -- -- 98 -- -- --   11/07/21 1911 -- -- -- 101 -- -- --   11/07/21 1910 -- -- -- 109 -- -- --   11/07/21 1909 -- -- -- 111 -- -- --   11/07/21 1907 -- -- -- 103 -- -- --   11/07/21 1906 -- -- -- 99 -- -- --   11/07/21 1905 -- -- -- 102 -- -- --   11/07/21 1904 -- -- -- 97 -- -- --   11/07/21 1903 -- -- -- 103 -- -- --   11/07/21 1901 -- -- -- (!) 131 -- -- --   11/07/21 1900 -- -- -- 111 -- -- --   11/07/21 1859 -- -- -- 101 -- -- --   11/07/21 1858 -- -- -- 111 -- -- --   11/07/21 1856 -- -- -- (!) 123 -- -- --   11/07/21 1855 113/79 98.8 °F (37.1 °C) Oral (!) 124 16 -- --   11/07/21 1854 -- -- -- 105 -- -- --   11/07/21 1853  94/83 -- -- 107 -- -- --   11/07/21 1851 -- -- -- (!) 122 -- -- --   11/07/21 1850 -- -- -- (!) 142 -- -- --   11/07/21 1849 -- -- -- 116 -- -- --   11/07/21 1848 -- -- -- 112 -- -- --   11/07/21 1846 -- -- -- 106 -- -- --   11/07/21 1845 -- -- -- (!) 128 -- -- --   11/07/21 1844 -- -- -- 113 -- -- --   11/07/21 1843 -- -- -- 109 -- -- --   11/07/21 1841 -- -- -- 115 -- -- --   11/07/21 1840 -- -- -- (!) 130 -- -- --   11/07/21 1839 -- -- -- 119 -- -- --   11/07/21 1838 -- -- -- 98 -- -- --   11/07/21 1836 -- -- -- 107 -- -- --   11/07/21 1835 -- -- -- (!) 123 -- -- --   11/07/21 1834 -- -- -- (!) 128 -- -- --   11/07/21 1832 -- -- -- 112 -- -- --   11/07/21 1831 -- -- -- (!) 126 -- -- --   11/07/21 1830 -- -- -- (!) 146 -- -- --   11/07/21 1829 -- -- -- (!) 136 -- -- --   11/07/21 1827 -- -- -- 117 -- -- --   11/07/21 1826 -- -- -- 109 -- -- --   11/07/21 1825 -- -- -- 106 -- -- --   11/07/21 1824 -- -- -- 106 -- -- --   11/07/21 1822 -- -- -- 105 -- -- --   11/07/21 1821 -- -- -- 114 -- -- --   11/07/21 1820 -- -- -- 102 -- -- --   11/07/21 1819 -- -- -- 95 -- -- --   11/07/21 1818 -- -- -- 111 -- -- --   11/07/21 1816 -- -- -- 107 -- -- --   11/07/21 1815 -- -- -- 109 -- -- --   11/07/21 1814 -- -- -- 119 -- -- --   11/07/21 1813 -- -- -- 97 -- -- --   11/07/21 1811 -- -- -- 97 -- -- --   11/07/21 1810 -- -- -- 113 -- -- --   11/07/21 1809 -- -- -- 99 -- -- --   11/07/21 1807 -- -- -- 100 -- -- --   11/07/21 1806 -- -- -- 99 -- -- --   11/07/21 1805 -- -- -- 94 -- -- --   11/07/21 1804 -- -- -- 101 -- -- --   11/07/21 1802 -- -- -- 109 -- -- --   11/07/21 1801 -- -- -- 112 -- -- --   11/07/21 1800 -- -- -- 110 -- -- --   11/07/21 1559 94/72 98.7 °F (37.1 °C) Oral 107 16 -- --   11/07/21 1459 100/79 -- -- 103 -- -- --     Physical Exam:    Right hand pink, warm and well-perfused.  Palpable ulnar artery pulse.  Sensorimotor exam normal.  No upper extremity swelling.  Right and left maximum forearm circumference is  29.5 cm bilaterally.  Resolving scant ecchymosis right groin, without palpable hematoma.  Negligible tenderness.    CBC    Results from last 7 days   Lab Units 11/08/21 0351 11/07/21  0420 11/06/21  0516 11/05/21  1442 11/04/21  0758 11/03/21  0932 11/03/21  0316 11/02/21  1806 11/02/21  1806   WBC 10*3/mm3 6.73 7.02 6.71 6.92 7.03  --  6.19  --  6.86   HEMOGLOBIN g/dL 12.5* 13.2 12.7* 14.0 13.2  --  11.4*  --  12.3*   PLATELETS 10*3/mm3 160 131* 89* 81* 46* 36* 36*   < > 38*    < > = values in this interval not displayed.     BMP   Results from last 7 days   Lab Units 11/08/21 0351 11/07/21 0420 11/06/21  0516 11/05/21  1442 11/04/21  0758 11/03/21  1951 11/03/21  0316 11/03/21  0026 11/02/21  1806 11/02/21  0924 11/02/21  0923 11/02/21  0403   SODIUM mmol/L 141 143 142 137 138  --  143  --   --   --   --  142   POTASSIUM mmol/L 3.7 3.6 3.6 3.4* 3.6  --  3.8  --   --  3.5  --  3.6   CHLORIDE mmol/L 107 105 105 100 98  --  105  --   --   --   --  102   CO2 mmol/L 23.5 27.5 28.2 29.2* 30.7*  --  30.4*  --   --   --   --  28.4   BUN mg/dL 18 16 16 19 19  --  21*  --   --   --   --  30*   CREATININE mg/dL 0.93 0.84 0.92 1.14 0.87  --  1.02 1.06   < >  --    < > 1.63*   GLUCOSE mg/dL 96 90 88 136* 97  --  94  --   --   --   --  108*   MAGNESIUM mg/dL  --   --  1.8 1.9 1.8  --  1.6  --   --   --   --   --    PHOSPHORUS mg/dL  --  2.9  --  2.4* 2.7 2.7 2.3*  --   --   --   --   --     < > = values in this interval not displayed.     Cr Clearance Estimated Creatinine Clearance: 97.6 mL/min (by C-G formula based on SCr of 0.93 mg/dL).  Coag   Results from last 7 days   Lab Units 11/06/21  0516 11/05/21  1442 11/04/21  0758 11/03/21  0316 11/02/21  1806 11/02/21  0558   INR  1.18* 1.15* 1.18* 1.27* 1.26* 1.29*   APTT seconds 28.8 30.8 27.4 30.1 30.5 42.2*     Assessment/Plan   Assessment & Plan    Cardiogenic shock (HCC)    Atrial fibrillation with rapid ventricular response (HCC)    Right upper quadrant abdominal pain     Shock liver    Mitral insufficiency, acute    Coagulopathy (HCC)    Thrombocytopenia (HCC)    Other specified anemias    Hypofibrinogenemia (HCC)    Acute deep vein thrombosis (DVT) of right upper extremity (HCC)    DVT, lower extremity, proximal, acute, bilateral (HCC)    Pseudoaneurysm of femoral artery following procedure (HCC)    56 y.o. male with numerous vascular problems after presenting with cardiac arrest with cardiogenic shock including post catheterization right radial artery occlusion, asymptomatic and being managed nonoperatively; right upper extremity DVT, minimally symptomatic and being managed with anticoagulation; and small 1 cm post catheterization pseudoaneurysm of the proximal superficial femoral artery, being managed none interventionally for now.  Patient also having tachyarrhythmias with rapid ventricular response as recent as last night.  Hoping to continue to manage his problems none interventionally.  No objection to transitioning to DOAC and home discharge.    Personal protective equipment used for this patient encounter:  Patient wearing surgical mask []    Provider wearing a surgical mask [x]    Gloves []    Eye protection []    Face Shield []    Gown []    N 95 respirator or CAPR/PAPR []   Duration of interaction about 4 minutes.    Dave Kovacs MD  11/08/21  14:20 EST    Please call my office with any question: [phone]    Active Hospital Problems    Diagnosis  POA   • **Cardiogenic shock (HCC) [R57.0]  Yes   • Acute deep vein thrombosis (DVT) of right upper extremity (HCC) [I82.621]  Unknown   • DVT, lower extremity, proximal, acute, bilateral (HCC) [I82.4Y3]  Unknown   • Pseudoaneurysm of femoral artery following procedure (HCC) [T81.718A, I72.4]  Unknown   • Other specified anemias [D64.89]  Unknown   • Hypofibrinogenemia (HCC) [D68.8]  Yes   • Coagulopathy (HCC) [D68.9]  Yes   • Thrombocytopenia (HCC) [D69.6]  Yes   • Atrial fibrillation with rapid ventricular response  (HCC) [I48.91]  Yes   • Right upper quadrant abdominal pain [R10.11]  Unknown   • Shock liver [K72.00]  Yes   • Mitral insufficiency, acute [I34.0]  Yes      Resolved Hospital Problems    Diagnosis Date Resolved POA   • KRISHNA (acute kidney injury) (HCC) [N17.9] 11/05/2021 Yes   • Hyperkalemia [E87.5] 11/05/2021 Yes

## 2021-11-08 NOTE — THERAPY DISCHARGE NOTE
Patient Name: Denis Chavez  : 1965    MRN: [id number]                              Today's Date: 2021       Admit Date: 10/29/2021    Visit Dx:     ICD-10-CM ICD-9-CM   1. Atrial fibrillation with rapid ventricular response (HCC)  I48.91 427.31   2. KRISHNA (acute kidney injury) (HCC)  N17.9 584.9   3. Hyperkalemia  E87.5 276.7   4. Right upper quadrant abdominal pain  R10.11 789.01   5. Elevated LFTs  R79.89 790.6   6. Cardiogenic shock (HCC)  R57.0 785.51     Patient Active Problem List   Diagnosis   • Atrial fibrillation with rapid ventricular response (HCC)   • Right upper quadrant abdominal pain   • Shock liver   • Mitral insufficiency, acute   • Cardiogenic shock (HCC)   • Coagulopathy (HCC)   • Thrombocytopenia (HCC)   • Other specified anemias   • Hypofibrinogenemia (HCC)   • Acute deep vein thrombosis (DVT) of right upper extremity (HCC)   • DVT, lower extremity, proximal, acute, bilateral (HCC)   • Pseudoaneurysm of femoral artery following procedure (HCC)     History reviewed. No pertinent past medical history.  Past Surgical History:   Procedure Laterality Date   • CARDIAC CATHETERIZATION Right 10/29/2021    Procedure: Left Heart Cath;  Surgeon: Get Cunha MD;  Location: [facility] CATH INVASIVE LOCATION;  Service: Cardiology;  Laterality: Right;   • CARDIAC CATHETERIZATION N/A 10/29/2021    Procedure: Coronary angiography;  Surgeon: Get Cunha MD;  Location: [facility] CATH INVASIVE LOCATION;  Service: Cardiology;  Laterality: N/A;   • CARDIAC ELECTROPHYSIOLOGY PROCEDURE N/A 10/30/2021    Procedure: IMPELLA INSERTION;  Surgeon: Get Cunha MD;  Location: [facility] CATH INVASIVE LOCATION;  Service: Cardiology;  Laterality: N/A;   • CARDIAC ELECTROPHYSIOLOGY PROCEDURE N/A 10/30/2021    Procedure: Cardioversion;  Surgeon: Get Cunha MD;  Location: [facility] CATH INVASIVE LOCATION;  Service: Cardiology;  Laterality: N/A;   • CARDIAC ELECTROPHYSIOLOGY PROCEDURE N/A 2021    Procedure:  Impella Removal;  Surgeon: Get Cunha MD;  Location:  [facility] CATH INVASIVE LOCATION;  Service: Cardiology;  Laterality: N/A;      General Information     Row Name 11/08/21 0928          Physical Therapy Time and Intention    Document Type therapy note (daily note)  -     Mode of Treatment physical therapy  -     Row Name 11/08/21 0928          General Information    Patient Profile Reviewed yes  -     Prior Level of Function independent:  -     Existing Precautions/Restrictions fall  -     Barriers to Rehab medically complex  -     Row Name 11/08/21 0928          Home Main Entrance    Number of Stairs, Main Entrance --  fight to basement  -     Row Name 11/08/21 0928          Stairs Within Home, Primary    Number of Stairs, Within Home, Primary other (see comments)  flight to basement  -     Surface of Stairs, Within Home, Primary carpeting  -     Stair Railings, Within Home, Primary railings safe and in good condition  -     Row Name 11/08/21 0942 11/08/21 0928       Cognition    Orientation Status (Cognition) oriented x 4  - oriented x 4  -    Row Name 11/08/21 0942 11/08/21 0928       Safety Issues, Functional Mobility    Safety Issues Affecting Function (Mobility) -- insight into deficits/self-awareness  -    Impairments Affecting Function (Mobility) endurance/activity tolerance  - endurance/activity tolerance; balance; strength  -    Comment, Safety Issues/Impairments (Mobility) Pt steady with gait.  Limited primarily by cardiac issues.  - --          User Key  (r) = Recorded By, (t) = Taken By, (c) = Cosigned By    Initials Name Provider Type     Marva Viera, PT Physical Therapist               Mobility     Row Name 11/08/21 0944          Bed Mobility    Bed Mobility sit-supine  -     Supine-Sit Max (Bed Mobility) independent  -     Sit-Supine Max (Bed Mobility) independent  -     Assistive Device (Bed Mobility) head of bed elevated  -      Row Name 11/08/21 0944          Bed-Chair Transfer    Bed-Chair [place] (Transfers) modified independence  -KP     Row Name 11/08/21 0944          Sit-Stand Transfer    Sit-Stand [place] (Transfers) modified independence  -KP     Row Name 11/08/21 0944          Gait/Stairs (Locomotion)    [place] Level (Gait) supervision  -KP     Distance in Feet (Gait) 250  -KP     Deviations/Abnormal Patterns (Gait) gait speed decreased  -KP     Bilateral Gait Deviations forward flexed posture  B LE ER  -KP     [place] Level (Stairs) supervision  -KP     Handrail Location (Stairs) right side (ascending)  -KP     Number of Steps (Stairs) 12  -KP     Ascending Technique (Stairs) step-over-step  -KP     Descending Technique (Stairs) step-over-step  -KP     Stairs, Safety Issues --  VC fro foot placement on stairs  -KP     Comment (Gait/Stairs) No LOB with gait.  Pt reports he feels close to baseline  -KP           User Key  (r) = Recorded By, (t) = Taken By, (c) = Cosigned By    Initials Name Provider Type    Marva Roca PT Physical Therapist               Obj/Interventions     Row Name 11/08/21 1043          Balance    Static Sitting Balance WNL  -KP     Dynamic Sitting Balance WNL  -KP     Static Standing Balance WFL  -KP     Dynamic Standing Balance WFL  -KP           User Key  (r) = Recorded By, (t) = Taken By, (c) = Cosigned By    Initials Name Provider Type    Marva Roca PT Physical Therapist               Goals/Plan     Row Name 11/08/21 1047          Transfer Goal 1 (PT)    Activity/Assistive Device (Transfer Goal 1, PT) sit-to-stand/stand-to-sit; bed-to-chair/chair-to-bed  -     [place] Level/Cues Needed (Transfer Goal 1, PT) modified independence  -KP     Progress/Outcome (Transfer Goal 1, PT) goal met  -KP     Row Name 11/08/21 1047          Gait Training Goal 1 (PT)    Activity/Assistive Device (Gait Training Goal 1, PT) gait (walking locomotion)  -     [place] Level  (Gait Training Goal 1, PT) modified independence; supervision required  -KP     Distance (Gait Training Goal 1, PT) 250  -KP     Progress/Outcome (Gait Training Goal 1, PT) goal met  -KP           User Key  (r) = Recorded By, (t) = Taken By, (c) = Cosigned By    Initials Name Provider Type    Marva Roca PT Physical Therapist               Clinical Impression     Row Name 11/08/21 1044          Pain Scale: Numbers Pre/Post-Treatment    Pretreatment Pain Rating 0/10 - no pain  -KP     Posttreatment Pain Rating 0/10 - no pain  -KP     Row Name 11/08/21 1044          Plan of Care Review    Plan of Care Reviewed With patient  -KP     Outcome Summary Pt feels he is close to baseline other than cardiac issues.  Feels steady with gait and stairs and denies any mobility concerns.  Pt will be d/c to amb with sup of nurses as tolerated.  -KP     Row Name 11/08/21 1044          Therapy Assessment/Plan (PT)    Criteria for Skilled Interventions Met (PT) no  -KP     Row Name 11/08/21 1044          Vital Signs    Pretreatment Heart Rate (beats/min) 108  -KP     Intratreatment Heart Rate (beats/min) 138  -KP     Posttreatment Heart Rate (beats/min) 110  -KP     O2 Delivery Pre Treatment room air  -KP     O2 Delivery Intra Treatment room air  -KP     O2 Delivery Post Treatment room air  -KP     Pre Patient Position Supine  -KP     Intra Patient Position Standing  -KP     Post Patient Position Sitting  -KP     Row Name 11/08/21 1044          Positioning and Restraints    Pre-Treatment Position in bed  -KP     Post Treatment Position bed  -KP     In Bed notified nsg; sitting EOB; call light within reach; encouraged to call for assist  -KP           User Key  (r) = Recorded By, (t) = Taken By, (c) = Cosigned By    Initials Name Provider Type    Marva Roca PT Physical Therapist               Outcome Measures     Row Name 11/08/21 1048          How much help from another person do you currently need...    Turning from  your back to your side while in flat bed without using bedrails? 4  -KP     Moving from lying on back to sitting on the side of a flat bed without bedrails? 4  -KP     Moving to and from a bed to a chair (including a wheelchair)? 4  -KP     Standing up from a chair using your arms (e.g., wheelchair, bedside chair)? 4  -KP     Climbing 3-5 steps with a railing? 4  -KP     To walk in hospital room? 4  -KP     AM-PAC 6 Clicks Score (PT) 24  -KP           User Key  (r) = Recorded By, (t) = Taken By, (c) = Cosigned By    Initials Name Provider Type     Marva Viera, PT Physical Therapist              Physical Therapy Education                 Title: PT OT SLP Therapies (Done)     Topic: Physical Therapy (Done)     Point: Mobility training (Done)     Learning Progress Summary           Patient Eager, E,TB,D, VU,DU by  at 11/8/2021 1048    Comment: Denies concern re d/c.    Acceptance, E,D, DU,VU by  at 11/6/2021 1059    Acceptance, E, VU,NR by  at 11/5/2021 1520    Acceptance, E, VU,NR by  at 11/4/2021 1545                   Point: Home exercise program (Done)     Learning Progress Summary           Patient Acceptance, E,D, DU,VU by  at 11/6/2021 1059                   Point: Body mechanics (Done)     Learning Progress Summary           Patient Acceptance, E,D, DU,VU by  at 11/6/2021 1059    Acceptance, E, VU,NR by  at 11/4/2021 1545                   Point: Precautions (Done)     Learning Progress Summary           Patient Eager, E,TB,D, VU,DU by  at 11/8/2021 1048    Comment: Denies concern re d/c.    Acceptance, E,D, DU,VU by  at 11/6/2021 1059    Acceptance, E, VU,NR by  at 11/4/2021 1545                               User Key     Initials Effective Dates Name Provider Type Discipline     03/07/18 -  Rajwinder Neil, PTA Physical Therapy Assistant PT     06/16/21 -  Marva Viera, PT Physical Therapist PT     07/02/20 -  Talon Siddiqui, PT DPT Physical Therapist PT    CF 06/16/21  -  Emma Hinds, PT Physical Therapist PT              PT Recommendation and Plan     Plan of Care Reviewed With: patient  Outcome Summary: Pt feels he is close to baseline other than cardiac issues.  Feels steady with gait and stairs and denies any mobility concerns.  Pt will be d/c to amb with sup of nurses as tolerated.     Time Calculation:    PT Charges     Row Name 11/08/21 1049             Time Calculation    Start Time 0900  -KP      Stop Time 0920  -KP      Time Calculation (min) 20 min  -KP      PT Received On 11/08/21  -            User Key  (r) = Recorded By, (t) = Taken By, (c) = Cosigned By    Initials Name Provider Type     Marva Viera PT Physical Therapist              Therapy Charges for Today     Code Description Service Date Service Provider Modifiers Qty    [id number]  PT THERAPEUTIC ACT EA 15 MIN 11/8/2021 Marva Viera PT GP 3        Patient was intermittently wearing a face mask during this therapy encounter. Therapist used appropriate personal protective equipment including mask and gloves.  Mask used was standard procedure mask. Appropriate PPE was worn during the entire therapy session. Hand hygiene was completed before and after therapy session. Patient is not in enhanced droplet precautions.     PT G-Codes  Outcome Measure Options: AM-PAC 6 Clicks Basic Mobility (PT)  AM-PAC 6 Clicks Score (PT): 24    PT Discharge Summary  Anticipated Discharge Disposition (PT): home, home with assist  Reason for Discharge: All goals achieved  Outcomes Achieved: Able to achieve all goals within established timeline  Discharge Destination: Home, Home with assist    Marva Viera PT  11/8/2021

## 2021-11-08 NOTE — PROGRESS NOTES
"   LOS: 10 days    Patient Care Team:  Provider, No Known as PCP - General    Chief Complaint:    Chief Complaint   Patient presents with   • Abdominal Pain   • Nausea     Follow UP KRISHNA  Subjective     Interval History:     No new complaints noted today.  Denies any chest pain or shortness of breath.  Afebrile.  Objective     Vital Signs  Temp:  [98.7 °F (37.1 °C)-99.1 °F (37.3 °C)] 99.1 °F (37.3 °C)  Heart Rate:  [] 101  Resp:  [16-18] 17  BP: ()/(70-88) 108/70    Flowsheet Rows      First Filed Value   Admission Height 175.3 cm (69\") Documented at 10/29/2021 1717   Admission Weight 99.8 kg (220 lb) Documented at 10/29/2021 1433          I/O this shift:  In: 360 [P.O.:360]  Out: -   I/O last 3 completed shifts:  In: 900 [P.O.:900]  Out: -     Intake/Output Summary (Last 24 hours) at 11/8/2021 1125  Last data filed at 11/8/2021 0805  Gross per 24 hour   Intake 960 ml   Output --   Net 960 ml       Physical Exam:  General Appearance: alert, oriented x 3, no acute distress,   Skin: warm and dry  HEENT: pupils round and reactive to light, oral mucosa normal, nonicteric sclera  Neck: supple, no JVD, trachea midline  Lungs: CTA, unlabored breathing effort  Heart: RRR, normal S1 and S2, no S3, no rub  Abdomen: soft, nontender, normoactive bowels  : no palpable bladder,  Extremities: trace edema, cyanosis or clubbing  Neuro: normal speech and mental status       Results Review:    Results from last 7 days   Lab Units 11/08/21  0351 11/07/21  0420 11/06/21  0516   SODIUM mmol/L 141 143 142   POTASSIUM mmol/L 3.7 3.6 3.6   CHLORIDE mmol/L 107 105 105   CO2 mmol/L 23.5 27.5 28.2   BUN mg/dL 18 16 16   CREATININE mg/dL 0.93 0.84 0.92   CALCIUM mg/dL 8.2* 8.6 8.3*   BILIRUBIN mg/dL 1.9* 2.0* 2.2*   ALK PHOS U/L 80 84 84   ALT (SGPT) U/L 412* 548* 691*   AST (SGOT) U/L 50* 65* 84*   GLUCOSE mg/dL 96 90 88       Estimated Creatinine Clearance: 97.6 mL/min (by C-G formula based on SCr of 0.93 mg/dL).    Results from " last 7 days   Lab Units 11/07/21  0420 11/06/21  0516 11/05/21  1442 11/04/21  0758   MAGNESIUM mg/dL  --  1.8 1.9 1.8   PHOSPHORUS mg/dL 2.9  --  2.4* 2.7             Results from last 7 days   Lab Units 11/08/21  0351 11/07/21  0420 11/06/21  0516 11/05/21  1442 11/04/21  0758   WBC 10*3/mm3 6.73 7.02 6.71 6.92 7.03   HEMOGLOBIN g/dL 12.5* 13.2 12.7* 14.0 13.2   PLATELETS 10*3/mm3 160 131* 89* 81* 46*       Results from last 7 days   Lab Units 11/06/21  0516 11/05/21  1442 11/04/21  0758 11/03/21  0316 11/02/21  1806   INR  1.18* 1.15* 1.18* 1.27* 1.26*         Imaging Results (Last 24 Hours)     ** No results found for the last 24 hours. **        chlorhexidine, 15 mL, Mouth/Throat, Q12H  dilTIAZem, 90 mg, Oral, Q8H  enoxaparin, 1 mg/kg, Subcutaneous, Q12H  furosemide, 20 mg, Oral, BID  losartan, 50 mg, Oral, Q24H  metoprolol succinate XL, 100 mg, Oral, Q12H  mupirocin, , Each Nare, BID  potassium chloride, 20 mEq, Oral, Daily  sodium chloride, 10 mL, Intravenous, Q12H           Medication Review:   Current Facility-Administered Medications   Medication Dose Route Frequency Provider Last Rate Last Admin   • atropine sulfate injection 0.5 mg  0.5 mg Intravenous Q5 Min PRN Kurt Evangelista MD       • atropine sulfate injection 0.5 mg  0.5 mg Intravenous Q5 Min PRN Kurt Evangelista MD       • chlorhexidine (PERIDEX) 0.12 % solution 15 mL  15 mL Mouth/Throat Q12H Kurt Evangelista MD   15 mL at 11/07/21 0821   • dilTIAZem (CARDIZEM) tablet 90 mg  90 mg Oral Q8H Jose Salcedo MD   90 mg at 11/08/21 0437   • enoxaparin (LOVENOX) syringe 90 mg  1 mg/kg Subcutaneous Q12H Denis Melgoza Jr., MD   90 mg at 11/08/21 1016   • furosemide (LASIX) tablet 20 mg  20 mg Oral BID Lulu Rico MD   20 mg at 11/08/21 1016   • HYDROcodone-acetaminophen (NORCO) 5-325 MG per tablet 1 tablet  1 tablet Oral Q4H PRN Kurt Evangelista MD       • losartan (COZAAR) tablet 50 mg  50 mg Oral Q24H Kurt Evangelista MD   50 mg at  11/07/21 1100   • metoprolol succinate XL (TOPROL-XL) 24 hr tablet 100 mg  100 mg Oral Q12H Get Cunha MD   100 mg at 11/08/21 1016   • mupirocin (BACTROBAN) 2 % nasal ointment   Each Nare BID Dave Kovacs MD       • naloxone (NARCAN) injection 0.4 mg  0.4 mg Intravenous Q5 Min PRN Kurt Evangelista MD       • potassium chloride (K-DUR,KLOR-CON) ER tablet 20 mEq  20 mEq Oral Daily Lulu Rico MD   20 mEq at 11/08/21 1016   • potassium chloride (K-DUR,KLOR-CON) ER tablet 40 mEq  40 mEq Oral PRN Kurt Evangelista MD        Or   • potassium chloride (KLOR-CON) packet 40 mEq  40 mEq Oral PRN Kurt Evangelista MD   40 mEq at 11/01/21 2224    Or   • potassium chloride 10 mEq in 100 mL IVPB  10 mEq Intravenous Q1H PRN Kurt Evangelista  mL/hr at 11/02/21 2007 10 mEq at 11/02/21 2007   • sodium chloride 0.9 % flush 10 mL  10 mL Intravenous Q12H Kurt Evangelista MD   10 mL at 11/08/21 1019   • sodium chloride 0.9 % infusion 250 mL  250 mL Intravenous Once PRN Kurt Evangelista MD           Assessment/Plan         Cardiogenic shock (HCC)    Atrial fibrillation with rapid ventricular response (HCC)    Right upper quadrant abdominal pain    Shock liver    Mitral insufficiency, acute    Coagulopathy (HCC)    Thrombocytopenia (HCC)    Other specified anemias    Hypofibrinogenemia (HCC)    Acute deep vein thrombosis (DVT) of right upper extremity (HCC)    DVT, lower extremity, proximal, acute, bilateral (HCC)    Pseudoaneurysm of femoral artery following procedure (HCC)       ASSESSMENT:  1. KRISHNA.  Nonoliguric.   Creatinine back to baseline electrolytes, volume status acceptable   2. Nonischemic cardiogenic shock . Off Impella. Biventricular failure. LV and RV improved on repeat echo 11/2.  Acute respiratory failure resolved off vent.    3. Shock liver.  Slowly improving.   4. TCP, coagulopathy with correction on mixing study. Dr. Melgoza evaluating.  Hypercoag state with multiple DVT  RIJ, RUE, bilateral  lower ext DVT   5. Atrial fibrillation. SP cardioversion x 2 without success.  Digoxin loaded 11/1.  Oral metoprolol per cardiology.       PLAN:  Continue current dose of Lasix  Okay to discharge home from nephrology standpoint.  Will recommend follow-up in 1 to 2 weeks with repeat renal panel    Jade Castro MD  11/08/21  11:25 EST

## 2021-11-08 NOTE — PROGRESS NOTES
[facility] INPATIENT PROGRESS NOTE    Length of Stay:  10 days    CHIEF COMPLAINT: Cardiogenic shock with multiorgan failure, coagulopathy, thrombocytopenia, right upper extremity/IJ DVT      SUBJECTIVE:   No fevers. Slightly tachycardic at times. Normotensive. On room air. Feels well and ready for discharge. No complaints other than some fatigue.         ROS:  A 14 point  comprehensive review of systems was obtained with pertinent positive findings as noted in the interval history above.  All other systems negative.    OBJECTIVE:  Vitals:    11/08/21 0430 11/08/21 0555 11/08/21 0721 11/08/21 1051   BP: 111/83  109/82 108/70   BP Location: Right arm  Right arm Right arm   Patient Position: Lying  Lying Lying   Pulse: 98  106 101   Resp: 18  18 17   Temp:   99.1 °F (37.3 °C) 99.1 °F (37.3 °C)   TempSrc:   Oral Oral   SpO2: 98%  95% 98%   Weight:  90.7 kg (199 lb 14.4 oz)     Height:             PHYSICAL EXAMINATION:  General:  No acute distress, awake, alert and oriented  Skin:  Warm and dry, no visible rash  HEENT:  Normocephalic/atraumatic.   Chest:  Normal respiratory effort  Extremities:  No visible clubbing, cyanosis, or edema  Neuro/psych:  Grossly non-focal.  Normal mood and affect.      DIAGNOSTIC DATA:  Results Review:     I reviewed the patient's new clinical results.    Results from last 7 days   Lab Units 11/08/21  0351 11/07/21 0420 11/06/21  0516   WBC 10*3/mm3 6.73 7.02 6.71   HEMOGLOBIN g/dL 12.5* 13.2 12.7*   HEMATOCRIT % 38.6 39.0 37.5   PLATELETS 10*3/mm3 160 131* 89*      Results from last 7 days   Lab Units 11/08/21  0351 11/07/21 0420 11/06/21  0516   SODIUM mmol/L 141 143 142   POTASSIUM mmol/L 3.7 3.6 3.6   CHLORIDE mmol/L 107 105 105   CO2 mmol/L 23.5 27.5 28.2   BUN mg/dL 18 16 16   CREATININE mg/dL 0.93 0.84 0.92   CALCIUM mg/dL 8.2* 8.6 8.3*   BILIRUBIN mg/dL 1.9* 2.0* 2.2*   ALK PHOS U/L 80 84 84   ALT (SGPT) U/L 412* 548* 691*   AST (SGOT) U/L 50* 65* 84*   GLUCOSE mg/dL  96 90 88      Lab Results   Component Value Date    NEUTROABS 4.34 11/08/2021     Results from last 7 days   Lab Units 11/06/21  0516 11/05/21  1442 11/04/21  0758   INR  1.18* 1.15* 1.18*   APTT seconds 28.8 30.8 27.4     Results from last 7 days   Lab Units 11/06/21  0516   MAGNESIUM mg/dL 1.8       Assessment/Plan   ASSESSMENT/PLAN:  This is a 56 y.o. male with:     Cardiogenic shock, atrial fibrillation  · Biventricular failure, etiology unclear  · Patient requiring pressor support  · Patient had transthoracic echocardiogram, and cardiac catheterization on 10/29/2021.  · Status post cardioversion x2, persistent atrial fibrillation  · Impella device placed, initiated heparin  · Patient had POOJA on 10/30/2021 with ejection fraction 26-30%, severe dilation right ventricular cavity, moderate mitral regurgitation, Impella device in left ventricle.  · Investigation regarding potential cardiac transplant per cardiology, does not appear feasible due to lack of social support.  · Patient currently off of pressors, continuing on milrinone  · Bedside echocardiogram 11/2/2021 with ejection fraction 50%, low normal, normal right ventricular systolic function.  · Impella device removed on 11/2/2021, flat rate heparin discontinued as well  · Cardiac MRI on 11/5/2021 with global hypokinesis left ventricle, hypokinesis right ventricle, moderate biatrial enlargement, no evidence of infiltrative cardiomyopathy.  · Per Dr. Ma, patient will require ongoing anticoagulation due to atrial fibrillation.     *Acute hypoxic respiratory failure    · Patient previously required ventilatory support  · Patient extubated on 11/2/2021  · Complete resolution of pulmonary symptoms     *Shock liver  · Severe hepatic dysfunction secondary to cardiogenic shock  · Liver labs rapidly improving with an ALT of 412, AST 50, bilirubin 1.9, normal alkaline phosphatase at 80    *Acute kidney injury  · Secondary to cardiogenic shock  · Creatinine  normalized and remains normal     *Coagulopathy with hypofibrinogenemia/DIC  · Likely secondary to shock liver and reduced synthetic function in addition to possible effects from Impella device producing consumption  · Heparin drip initiated with placement of Impella device  · Patient has received FFP, cryoprecipitate, vitamin K  · PT mixing study 10/31/2021 with decreased from 22.3 down to 14.9 (near correction indicating likely a factor deficiency)  · Reluctant to administer further FFP/cryoprecipitate in the setting of active thrombosis (see below).  · Subsequent improvement in coagulation parameters spontaneously  · Labs last checked on 11/6/2021 and normal with INR 1.18, PTT 28.8, fibrinogen 280.  No plans for ongoing routine coag monitoring with stable values.    *Right upper extremity/IJ catheter associated/IJ DVT 10/31/2021 with subsequent acute right upper extremity IJ and subclavian, subacute right brachial DVT, acute bilateral upper extremity superficial thrombophlebitis, acute right femoral/calf DVT, acute left femoral DVT 11/3/2021  · Doppler on 10/31/2021 with acute right upper extremity catheter associated IJ thrombus, acute left upper extremity superficial thrombophlebitis (cephalic).  · Patient was previously receiving fixed rate heparin with Impella device (heparin initiated 10/30/2021) until Impella device was removed on 11/2/2021  · Orders placed for initiation of full dose heparin on 11/2/2021 however heparin was not initiated due to platelet count of 38,000.  · Dopplers on 11/3/2021 showed acute right upper extremity IJ and subclavian, subacute right brachial DVT, acute bilateral upper extremity superficial thrombophlebitis, acute right femoral/calf DVT, acute left femoral DVT  · With concern for heparin-induced thrombocytopenia and extensive thrombosis, initiated anticoagulation on 11/4/2021 with Arixtra 2.5 mg daily in the setting of platelet count 46,000.  Plan to gradually increase Arixtra  dose as platelet count improves.  · On 11/5/2021, platelet count up to 81,000.  Heparin antiplatelet antibody and serotonin release were still pending.  With improvement in the platelet count we will go ahead increased Arixtra to 7.5 mg daily.  · Labs from 11/3/2021 returned with negative heparin antiplatelet antibody (KASEY 0.129), serotonin release assay negative.  · With negative heparin antibody, changed from Arixtra to Lovenox 1 mg/kg (90 mg) every 12 hours beginning 11/6/2021.    · Initiation of Lovenox 11/7/2021  · No objections to Eliquis    *Thrombocytopenia.   · No prior labs available  · On admission 10/29/2021 platelets 131,000.   · Platelets trended down, 71,000 on 10/30/2021  · Labs on 10/31/2021 with negative heparin antiplatelet antibody with KASEY 0.121 OD.  · CT abdomen and pelvis 10/29/2021 with normal spleen.  · Thrombocytopenia felt to be related to consumption initially due to shock/DIC with exacerbation by Impella device  · Patient received platelet transfusion 2 units around time of placement of Impella device  · On 10/31/2021 platelet count 44,000, received 1 additional unit platelets with increase to 79,000  · Impella device was removed on 11/2/2021  · Platelet count declined further down to 38,000 on 11/2/2021 and 36,000 on 11/3/2021. With resolution of DIC by coag parameters and removal of Impella device, unclear as to the etiology of the worsening thrombocytopenia.   · On 11/3/2021, sent additional evaluation with evaluation today with B12 greater than 2000, folate 17.1, IPF 7.7% (elevated).  Serum protein electrophoresis and immunoelectrophoresis negative with normal quantitative immunoglobulin, free light chains unremarkable.  Negative heparin antiplatelet antibody (KASEY 0.129), serotonin release assay negative.  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    · Vascular surgery consulted, perfusion normal right hand, no intervention needed.    · Repeat Doppler on 11/6/2021 with no change in 1.1 cm right femoral pseudoaneurysm with 50% thrombosis.    · Plans by vascular surgery to manage conservatively and observe.    *Previous erythrocytosis  · Hematocrit on admission was 53.3 10/29/2021  · Hemoglobin stable    *Possible pneumonia/sepsis  · Patient received empiric Zosyn, completed course.     PLAN:  1. Okay to transition to oral Eliquis with ongoing improvement in liver labs. Cardiology to manage.  2. Note plans per vascular surgery to observe right femoral pseudoaneurysm  3. OK for discharge from my standpoint. No follow up with us anticipated.     Discussed with the patient.            Jamir Govea MD

## 2021-11-09 NOTE — OUTREACH NOTE
Prep Survey      Responses   [facility] patient discharged from? [facility]   Is LACE score < 7 ? No   Emergency Room discharge w/ pulse ox? No   Eligibility Readm Mgmt   Discharge diagnosis Cardiogenic shock Acute deep vein thrombosis    Does the patient have one of the following disease processes/diagnoses(primary or secondary)? Other   Does the patient have Home health ordered? No   Is there a DME ordered? No   Prep survey completed? Yes          Kelly Wright RN

## 2021-11-10 ENCOUNTER — READMISSION MANAGEMENT (OUTPATIENT)
Dept: CALL CENTER | Facility: HOSPITAL | Age: 56
End: 2021-11-10

## 2021-11-10 NOTE — OUTREACH NOTE
Medical Week 1 Survey      Responses   [facility] patient discharged from? [facility]   Does the patient have one of the following disease processes/diagnoses(primary or secondary)? Other   Week 1 attempt successful? No   Unsuccessful attempts Attempt 1          Zulema Mcqueen RN

## 2021-11-12 ENCOUNTER — READMISSION MANAGEMENT (OUTPATIENT)
Dept: CALL CENTER | Facility: HOSPITAL | Age: 56
End: 2021-11-12

## 2021-11-12 NOTE — OUTREACH NOTE
Medical Week 1 Survey      Responses   [facility] patient discharged from? [facility]   Does the patient have one of the following disease processes/diagnoses(primary or secondary)? Other   Week 1 attempt successful? No   Unsuccessful attempts Attempt 2          Zulema Mcqueen RN

## 2021-11-16 ENCOUNTER — OFFICE VISIT (OUTPATIENT)
Dept: CARDIOLOGY | Facility: CLINIC | Age: 56
End: 2021-11-16

## 2021-11-16 VITALS
OXYGEN SATURATION: 99 % | SYSTOLIC BLOOD PRESSURE: 106 MMHG | BODY MASS INDEX: 31.1 KG/M2 | WEIGHT: 210 LBS | HEIGHT: 69 IN | DIASTOLIC BLOOD PRESSURE: 70 MMHG | RESPIRATION RATE: 16 BRPM | HEART RATE: 74 BPM

## 2021-11-16 DIAGNOSIS — I72.4 PSEUDOANEURYSM OF FEMORAL ARTERY FOLLOWING PROCEDURE (HCC): ICD-10-CM

## 2021-11-16 DIAGNOSIS — D68.9 COAGULOPATHY (HCC): ICD-10-CM

## 2021-11-16 DIAGNOSIS — I48.91 ATRIAL FIBRILLATION WITH RAPID VENTRICULAR RESPONSE (HCC): Primary | ICD-10-CM

## 2021-11-16 DIAGNOSIS — I82.4Y3 DVT, LOWER EXTREMITY, PROXIMAL, ACUTE, BILATERAL (HCC): ICD-10-CM

## 2021-11-16 DIAGNOSIS — T81.718A PSEUDOANEURYSM OF FEMORAL ARTERY FOLLOWING PROCEDURE (HCC): ICD-10-CM

## 2021-11-16 DIAGNOSIS — I82.621 ACUTE DEEP VEIN THROMBOSIS (DVT) OF RIGHT UPPER EXTREMITY, UNSPECIFIED VEIN (HCC): ICD-10-CM

## 2021-11-16 DIAGNOSIS — R57.0 CARDIOGENIC SHOCK (HCC): ICD-10-CM

## 2021-11-16 PROCEDURE — 99214 OFFICE O/P EST MOD 30 MIN: CPT | Performed by: NURSE PRACTITIONER

## 2021-11-16 PROCEDURE — 93000 ELECTROCARDIOGRAM COMPLETE: CPT | Performed by: NURSE PRACTITIONER

## 2022-04-26 NOTE — PLAN OF CARE
Patient now on room air, up in bedside chair, passed bedside swallow eval with speech, able to take pills whole with water. Patient ok to move off unit, waiting on bed. Will continue to monitor and report changes to MD      Problem: Skin Injury Risk Increased  Goal: Skin Health and Integrity  Outcome: Ongoing, Progressing  Intervention: Optimize Skin Protection  Recent Flowsheet Documentation  Taken 11/3/2021 1200 by Braden Bryant RN  Head of Bed (HOB): HOB at 30 degrees  Taken 11/3/2021 1000 by Braden Bryant RN  Head of Bed (HOB): HOB at 30 degrees  Taken 11/3/2021 0800 by Braden Bryant RN  Head of Bed (HOB): HOB at 30 degrees     Problem: Fall Injury Risk  Goal: Absence of Fall and Fall-Related Injury  Outcome: Ongoing, Progressing  Intervention: Promote Injury-Free Environment  Recent Flowsheet Documentation  Taken 11/3/2021 1500 by Braden Bryant RN  Safety Promotion/Fall Prevention: safety round/check completed  Taken 11/3/2021 1400 by Braden Bryant RN  Safety Promotion/Fall Prevention: safety round/check completed  Taken 11/3/2021 1300 by Braden Bryant RN  Safety Promotion/Fall Prevention: safety round/check completed  Taken 11/3/2021 1200 by Braden Bryant RN  Safety Promotion/Fall Prevention: safety round/check completed  Taken 11/3/2021 1100 by Braden Bryant RN  Safety Promotion/Fall Prevention: safety round/check completed  Taken 11/3/2021 1000 by Braden Bryant RN  Safety Promotion/Fall Prevention: safety round/check completed  Taken 11/3/2021 0900 by Braden Bryant RN  Safety Promotion/Fall Prevention: safety round/check completed  Taken 11/3/2021 0800 by Braden Bryant RN  Safety Promotion/Fall Prevention: safety round/check completed     Problem: Restraint, Nonbehavioral (Nonviolent)  Goal: Discontinuation Criteria Achieved  Outcome: Ongoing, Progressing  Goal: Personal Dignity and Safety Maintained  Outcome: Ongoing, Progressing  Intervention: Protect Skin and Joint  Received VM from patient.    Returned call. Patient stated that she has been having intermittent non-exertional chest pain over the past 2 weeks and stated that her HR has been going up to between 140-160 at times.     Confirmed still taking atenolol 25 mg 0.5 tab BID. Asked patient what her BP has been running and she stated that she does not check it.     Advised I would discuss with Dr. Barksdale and call back with his recommendations.   Integrity  Recent Flowsheet Documentation  Taken 11/3/2021 1400 by Braden Bryant RN  Body Position: (up in bedside chair)   other (see comments)   dangle, side of bed  Taken 11/3/2021 1200 by Braden Bryant RN  Body Position:   turned   position changed independently  Taken 11/3/2021 1000 by Braden Bryant RN  Body Position: turned  Taken 11/3/2021 0800 by Braden Bryant RN  Body Position: turned     Problem: Adult Inpatient Plan of Care  Goal: Plan of Care Review  Outcome: Ongoing, Progressing  Goal: Patient-Specific Goal (Individualized)  Outcome: Ongoing, Progressing  Goal: Absence of Hospital-Acquired Illness or Injury  Outcome: Ongoing, Progressing  Intervention: Identify and Manage Fall Risk  Recent Flowsheet Documentation  Taken 11/3/2021 1500 by Braden Bryant RN  Safety Promotion/Fall Prevention: safety round/check completed  Taken 11/3/2021 1400 by Braden Bryant RN  Safety Promotion/Fall Prevention: safety round/check completed  Taken 11/3/2021 1300 by Braden Bryant RN  Safety Promotion/Fall Prevention: safety round/check completed  Taken 11/3/2021 1200 by Braden Bryant RN  Safety Promotion/Fall Prevention: safety round/check completed  Taken 11/3/2021 1100 by Braden Bryant RN  Safety Promotion/Fall Prevention: safety round/check completed  Taken 11/3/2021 1000 by Braden Bryant RN  Safety Promotion/Fall Prevention: safety round/check completed  Taken 11/3/2021 0900 by Braden Bryant RN  Safety Promotion/Fall Prevention: safety round/check completed  Taken 11/3/2021 0800 by Braden Bryant RN  Safety Promotion/Fall Prevention: safety round/check completed  Intervention: Prevent Skin Injury  Recent Flowsheet Documentation  Taken 11/3/2021 1400 by Braden Bryant RN  Body Position: (up in bedside chair)   other (see comments)   dangle, side of bed  Taken 11/3/2021 1200 by Braden Bryant RN  Body Position:   turned   position changed independently  Taken 11/3/2021 1000 by Annette  AMARA Feliciano  Body Position: turned  Taken 11/3/2021 0800 by Braden Bryant RN  Body Position: turned  Goal: Optimal Comfort and Wellbeing  Outcome: Ongoing, Progressing  Goal: Readiness for Transition of Care  Outcome: Ongoing, Progressing     Problem: Adjustment to Device (Ventricular Assist Device)  Goal: Optimal Adjustment to Device  Outcome: Ongoing, Progressing     Problem: Bleeding (Ventricular Assist Device)  Goal: Absence of Bleeding  Outcome: Ongoing, Progressing     Problem: Embolism (Ventricular Assist Device)  Goal: Absence of Embolism Signs and Symptoms  Outcome: Ongoing, Progressing     Problem: Hemodynamic Instability (Ventricular Assist Device)  Goal: Optimal Blood Flow  Outcome: Ongoing, Progressing     Problem: Infection (Ventricular Assist Device)  Goal: Absence of Infection Signs and Symptoms  Outcome: Ongoing, Progressing     Problem: Communication Impairment (Mechanical Ventilation, Invasive)  Goal: Effective Communication  Outcome: Ongoing, Progressing     Problem: Device-Related Complication Risk (Mechanical Ventilation, Invasive)  Goal: Optimal Device Function  Outcome: Ongoing, Progressing     Problem: Inability to Wean (Mechanical Ventilation, Invasive)  Goal: Mechanical Ventilation Liberation  Outcome: Ongoing, Progressing     Problem: Nutrition Impairment (Mechanical Ventilation, Invasive)  Goal: Optimal Nutrition Delivery  Outcome: Ongoing, Progressing     Problem: Skin and Tissue Injury (Mechanical Ventilation, Invasive)  Goal: Absence of Device-Related Skin and Tissue Injury  Outcome: Ongoing, Progressing     Problem: Ventilator-Induced Lung Injury (Mechanical Ventilation, Invasive)  Goal: Absence of Ventilator-Induced Lung Injury  Outcome: Ongoing, Progressing  Intervention: Prevent Ventilator-Associated Pneumonia  Recent Flowsheet Documentation  Taken 11/3/2021 1400 by Braden Bryant RN  Oral Care: swabbed with antiseptic solution  Taken 11/3/2021 1200 by Braden Bryant  RN  Head of Bed (HOB): HOB at 30 degrees  Taken 11/3/2021 1000 by Braden Bryant RN  Head of Bed ([facility]): HOB at 30 degrees  Taken 11/3/2021 0800 by Braden Bryant RN  Head of Bed ([facility]): HOB at 30 degrees  Taken 11/3/2021 0755 by Braden Bryant RN  Oral Care: swabbed with antiseptic solution   Goal Outcome Evaluation:

## 2022-09-19 ENCOUNTER — TRANSCRIPTION ENCOUNTER (OUTPATIENT)
Age: 57
End: 2022-09-19

## 2022-09-20 ENCOUNTER — INPATIENT (INPATIENT)
Facility: HOSPITAL | Age: 57
LOS: 0 days | Discharge: HOME | End: 2022-09-21
Attending: SURGERY | Admitting: SURGERY

## 2022-09-20 ENCOUNTER — RESULT REVIEW (OUTPATIENT)
Age: 57
End: 2022-09-20

## 2022-09-20 ENCOUNTER — TRANSCRIPTION ENCOUNTER (OUTPATIENT)
Age: 57
End: 2022-09-20

## 2022-09-20 VITALS
HEART RATE: 67 BPM | HEIGHT: 74 IN | WEIGHT: 190.04 LBS | SYSTOLIC BLOOD PRESSURE: 110 MMHG | OXYGEN SATURATION: 100 % | RESPIRATION RATE: 18 BRPM | TEMPERATURE: 98 F | DIASTOLIC BLOOD PRESSURE: 69 MMHG

## 2022-09-20 DIAGNOSIS — T14.8XXA OTHER INJURY OF UNSPECIFIED BODY REGION, INITIAL ENCOUNTER: ICD-10-CM

## 2022-09-20 LAB
ALBUMIN SERPL ELPH-MCNC: 3.6 G/DL — SIGNIFICANT CHANGE UP (ref 3.3–5)
ALP SERPL-CCNC: 47 U/L — SIGNIFICANT CHANGE UP (ref 40–120)
ALT FLD-CCNC: 22 U/L — SIGNIFICANT CHANGE UP (ref 12–78)
ANION GAP SERPL CALC-SCNC: 4 MMOL/L — LOW (ref 5–17)
APTT BLD: 29.3 SEC — SIGNIFICANT CHANGE UP (ref 27.5–35.5)
AST SERPL-CCNC: 28 U/L — SIGNIFICANT CHANGE UP (ref 15–37)
BASOPHILS # BLD AUTO: 0.03 K/UL — SIGNIFICANT CHANGE UP (ref 0–0.2)
BASOPHILS NFR BLD AUTO: 0.7 % — SIGNIFICANT CHANGE UP (ref 0–2)
BILIRUB SERPL-MCNC: 0.6 MG/DL — SIGNIFICANT CHANGE UP (ref 0.2–1.2)
BLD GP AB SCN SERPL QL: SIGNIFICANT CHANGE UP
BUN SERPL-MCNC: 13 MG/DL — SIGNIFICANT CHANGE UP (ref 7–23)
CALCIUM SERPL-MCNC: 9 MG/DL — SIGNIFICANT CHANGE UP (ref 8.5–10.1)
CHLORIDE SERPL-SCNC: 109 MMOL/L — HIGH (ref 96–108)
CO2 SERPL-SCNC: 26 MMOL/L — SIGNIFICANT CHANGE UP (ref 22–31)
CREAT SERPL-MCNC: 1.06 MG/DL — SIGNIFICANT CHANGE UP (ref 0.5–1.3)
EGFR: 81 ML/MIN/1.73M2 — SIGNIFICANT CHANGE UP
EOSINOPHIL # BLD AUTO: 0.07 K/UL — SIGNIFICANT CHANGE UP (ref 0–0.5)
EOSINOPHIL NFR BLD AUTO: 1.7 % — SIGNIFICANT CHANGE UP (ref 0–6)
FLUAV AG NPH QL: SIGNIFICANT CHANGE UP
FLUBV AG NPH QL: SIGNIFICANT CHANGE UP
GLUCOSE SERPL-MCNC: 73 MG/DL — SIGNIFICANT CHANGE UP (ref 70–99)
HCT VFR BLD CALC: 43.2 % — SIGNIFICANT CHANGE UP (ref 39–50)
HGB BLD-MCNC: 14.1 G/DL — SIGNIFICANT CHANGE UP (ref 13–17)
IMM GRANULOCYTES NFR BLD AUTO: 0 % — SIGNIFICANT CHANGE UP (ref 0–0.9)
INR BLD: 1.07 RATIO — SIGNIFICANT CHANGE UP (ref 0.88–1.16)
LYMPHOCYTES # BLD AUTO: 1.89 K/UL — SIGNIFICANT CHANGE UP (ref 1–3.3)
LYMPHOCYTES # BLD AUTO: 46.2 % — HIGH (ref 13–44)
MCHC RBC-ENTMCNC: 27.8 PG — SIGNIFICANT CHANGE UP (ref 27–34)
MCHC RBC-ENTMCNC: 32.6 G/DL — SIGNIFICANT CHANGE UP (ref 32–36)
MCV RBC AUTO: 85 FL — SIGNIFICANT CHANGE UP (ref 80–100)
MONOCYTES # BLD AUTO: 0.39 K/UL — SIGNIFICANT CHANGE UP (ref 0–0.9)
MONOCYTES NFR BLD AUTO: 9.5 % — SIGNIFICANT CHANGE UP (ref 2–14)
NEUTROPHILS # BLD AUTO: 1.71 K/UL — LOW (ref 1.8–7.4)
NEUTROPHILS NFR BLD AUTO: 41.9 % — LOW (ref 43–77)
NRBC # BLD: 0 /100 WBCS — SIGNIFICANT CHANGE UP (ref 0–0)
PLATELET # BLD AUTO: 224 K/UL — SIGNIFICANT CHANGE UP (ref 150–400)
POTASSIUM SERPL-MCNC: 4.2 MMOL/L — SIGNIFICANT CHANGE UP (ref 3.5–5.3)
POTASSIUM SERPL-SCNC: 4.2 MMOL/L — SIGNIFICANT CHANGE UP (ref 3.5–5.3)
PROT SERPL-MCNC: 6.9 GM/DL — SIGNIFICANT CHANGE UP (ref 6–8.3)
PROTHROM AB SERPL-ACNC: 12.8 SEC — SIGNIFICANT CHANGE UP (ref 10.5–13.4)
RBC # BLD: 5.08 M/UL — SIGNIFICANT CHANGE UP (ref 4.2–5.8)
RBC # FLD: 12.9 % — SIGNIFICANT CHANGE UP (ref 10.3–14.5)
SARS-COV-2 RNA SPEC QL NAA+PROBE: SIGNIFICANT CHANGE UP
SODIUM SERPL-SCNC: 139 MMOL/L — SIGNIFICANT CHANGE UP (ref 135–145)
WBC # BLD: 4.09 K/UL — SIGNIFICANT CHANGE UP (ref 3.8–10.5)
WBC # FLD AUTO: 4.09 K/UL — SIGNIFICANT CHANGE UP (ref 3.8–10.5)

## 2022-09-20 PROCEDURE — 73551 X-RAY EXAM OF FEMUR 1: CPT | Mod: 26,LT,59

## 2022-09-20 PROCEDURE — 99284 EMERGENCY DEPT VISIT MOD MDM: CPT

## 2022-09-20 PROCEDURE — 88300 SURGICAL PATH GROSS: CPT | Mod: 26

## 2022-09-20 PROCEDURE — 73700 CT LOWER EXTREMITY W/O DYE: CPT | Mod: 26,LT,MA

## 2022-09-20 PROCEDURE — 27372 REMOVAL OF FOREIGN BODY: CPT

## 2022-09-20 PROCEDURE — 99223 1ST HOSP IP/OBS HIGH 75: CPT | Mod: 57

## 2022-09-20 PROCEDURE — 73552 X-RAY EXAM OF FEMUR 2/>: CPT | Mod: 26,LT

## 2022-09-20 RX ORDER — HYDROMORPHONE HYDROCHLORIDE 2 MG/ML
0.5 INJECTION INTRAMUSCULAR; INTRAVENOUS; SUBCUTANEOUS
Refills: 0 | Status: DISCONTINUED | OUTPATIENT
Start: 2022-09-20 | End: 2022-09-20

## 2022-09-20 RX ORDER — SODIUM CHLORIDE 9 MG/ML
1000 INJECTION, SOLUTION INTRAVENOUS
Refills: 0 | Status: DISCONTINUED | OUTPATIENT
Start: 2022-09-20 | End: 2022-09-21

## 2022-09-20 RX ORDER — TETANUS TOXOID, REDUCED DIPHTHERIA TOXOID AND ACELLULAR PERTUSSIS VACCINE, ADSORBED 5; 2.5; 8; 8; 2.5 [IU]/.5ML; [IU]/.5ML; UG/.5ML; UG/.5ML; UG/.5ML
0.5 SUSPENSION INTRAMUSCULAR ONCE
Refills: 0 | Status: COMPLETED | OUTPATIENT
Start: 2022-09-20 | End: 2022-09-20

## 2022-09-20 RX ORDER — ONDANSETRON 8 MG/1
4 TABLET, FILM COATED ORAL ONCE
Refills: 0 | Status: DISCONTINUED | OUTPATIENT
Start: 2022-09-20 | End: 2022-09-20

## 2022-09-20 RX ORDER — ACETAMINOPHEN 500 MG
1000 TABLET ORAL ONCE
Refills: 0 | Status: DISCONTINUED | OUTPATIENT
Start: 2022-09-20 | End: 2022-09-20

## 2022-09-20 RX ORDER — ENOXAPARIN SODIUM 100 MG/ML
40 INJECTION SUBCUTANEOUS EVERY 24 HOURS
Refills: 0 | Status: DISCONTINUED | OUTPATIENT
Start: 2022-09-20 | End: 2022-09-21

## 2022-09-20 RX ORDER — MORPHINE SULFATE 50 MG/1
2 CAPSULE, EXTENDED RELEASE ORAL EVERY 6 HOURS
Refills: 0 | Status: DISCONTINUED | OUTPATIENT
Start: 2022-09-20 | End: 2022-09-21

## 2022-09-20 RX ORDER — ONDANSETRON 8 MG/1
4 TABLET, FILM COATED ORAL EVERY 6 HOURS
Refills: 0 | Status: DISCONTINUED | OUTPATIENT
Start: 2022-09-20 | End: 2022-09-21

## 2022-09-20 RX ORDER — ACETAMINOPHEN 500 MG
1000 TABLET ORAL ONCE
Refills: 0 | Status: DISCONTINUED | OUTPATIENT
Start: 2022-09-20 | End: 2022-09-21

## 2022-09-20 RX ORDER — MORPHINE SULFATE 50 MG/1
2 CAPSULE, EXTENDED RELEASE ORAL EVERY 6 HOURS
Refills: 0 | Status: DISCONTINUED | OUTPATIENT
Start: 2022-09-20 | End: 2022-09-20

## 2022-09-20 RX ORDER — ENOXAPARIN SODIUM 100 MG/ML
40 INJECTION SUBCUTANEOUS EVERY 24 HOURS
Refills: 0 | Status: DISCONTINUED | OUTPATIENT
Start: 2022-09-20 | End: 2022-09-20

## 2022-09-20 RX ORDER — SODIUM CHLORIDE 9 MG/ML
1000 INJECTION, SOLUTION INTRAVENOUS
Refills: 0 | Status: DISCONTINUED | OUTPATIENT
Start: 2022-09-20 | End: 2022-09-20

## 2022-09-20 RX ORDER — CEFAZOLIN SODIUM 1 G
2000 VIAL (EA) INJECTION EVERY 8 HOURS
Refills: 0 | Status: DISCONTINUED | OUTPATIENT
Start: 2022-09-20 | End: 2022-09-21

## 2022-09-20 RX ADMIN — SODIUM CHLORIDE 125 MILLILITER(S): 9 INJECTION, SOLUTION INTRAVENOUS at 19:50

## 2022-09-20 RX ADMIN — SODIUM CHLORIDE 125 MILLILITER(S): 9 INJECTION, SOLUTION INTRAVENOUS at 22:56

## 2022-09-20 RX ADMIN — TETANUS TOXOID, REDUCED DIPHTHERIA TOXOID AND ACELLULAR PERTUSSIS VACCINE, ADSORBED 0.5 MILLILITER(S): 5; 2.5; 8; 8; 2.5 SUSPENSION INTRAMUSCULAR at 13:23

## 2022-09-20 NOTE — H&P ADULT - NSHPREVIEWOFSYSTEMS_GEN_ALL_CORE
REVIEW OF SYSTEMS:  Constitutional: Denies fever/chills, weight loss, fatigue.  Eye: Denies eye pain, visual changes, discharge.  ENT: Denies hearing changes, tinnitus, vertigo, sinus congestion, sore throat.  Respiratory: Denies cough, wheezing, hemoptysis, shortness of breath, difficulty breathing  Cardiovascular: Denies chest pain, palpitations, dizziness, dyspnea, leg swelling.  Gastrointestinal: Denies abdominal pain, nausea, vomiting, diarrhea, constipation, melena, hematochezia, hematemesis.  Genitourinary: Denies dysuria, frequency, hematuria, retention, incontinence.  Neurological: Denies headaches, memory loss, weakness, numbness, tremors.  MSK: Admits to pain at left anterior thigh

## 2022-09-20 NOTE — BRIEF OPERATIVE NOTE - OPERATION/FINDINGS
Under fluoroscopy, removal of left distal thigh foreign body (nail), deep. Nail removed intact.   Upon completion of procedure, an xray confirmed no additional foreign body.

## 2022-09-20 NOTE — H&P ADULT - ASSESSMENT
58 yo M  with no PMH who presents to ED c/o puncture in left thigh by electrical nail gun  received TDAP vaccine  Bedside removal attempted, but unsuccessful

## 2022-09-20 NOTE — DISCHARGE NOTE PROVIDER - YES NO FOR MLM POSITIVE OR NEGATIVE COVID RESULT
Will contact pt when records have been sufficiently uploaded into our electronic medical record.    ,

## 2022-09-20 NOTE — DISCHARGE NOTE PROVIDER - NSDCCPCAREPLAN_GEN_ALL_CORE_FT
PRINCIPAL DISCHARGE DIAGNOSIS  Diagnosis: Foreign body of left thigh, initial encounter  Assessment and Plan of Treatment:

## 2022-09-20 NOTE — ED ADULT NURSE NOTE - NSFALLRSKINDICATORS_ED_ALL_ED
Billing Type: Third-Party Bill Hide Second Anesthesia?: No Detail Level: Detailed Biopsy Type: H and E Post-Care Instructions: I reviewed with the patient in detail post-care instructions. Patient is to keep the biopsy site dry overnight, and then apply vaseline twice daily until healed. Patient may apply hydrogen peroxide soaks to remove any crusting. Render Post-Care Instructions In Note?: yes Silver Nitrate Text: The wound bed was treated with silver nitrate after the biopsy was performed. Electrodesiccation Text: The wound bed was treated with electrodesiccation after the biopsy was performed. Cryotherapy Text: The wound bed was treated with cryotherapy after the biopsy was performed. Wound Care: Vaseline Consent: Written consent was obtained and risks were reviewed including but not limited to scarring, infection, bleeding, scabbing, incomplete removal, nerve damage and allergy to anesthesia. Type Of Destruction Used: Electrodesiccation and Curettage Additional Anesthesia Volume In Cc (Will Not Render If 0): 0 Electrodesiccation And Curettage Text: The wound bed was treated with electrodesiccation and curettage after the biopsy was performed. no Notification Instructions: Patient will be notified of biopsy results. However, patient instructed to call the office if not contacted within 2 weeks. Anesthesia Volume In Cc (Will Not Render If 0): 0.5 Biopsy Method: double edge Personna blade Anesthesia Type: 1% lidocaine with epinephrine and a 1:10 solution of 8.4% sodium bicarbonate Depth Of Biopsy: dermis Hemostasis: Drysol Dressing: Band-Aid Information: Selecting Yes will display possible errors in your note based on the variables you have selected. This validation is only offered as a suggestion for you. PLEASE NOTE THAT THE VALIDATION TEXT WILL BE REMOVED WHEN YOU FINALIZE YOUR NOTE. IF YOU WANT TO FAX A PRELIMINARY NOTE YOU WILL NEED TO TOGGLE THIS TO 'NO' IF YOU DO NOT WANT IT IN YOUR FAXED NOTE.

## 2022-09-20 NOTE — ED PROVIDER NOTE - CLINICAL SUMMARY MEDICAL DECISION MAKING FREE TEXT BOX
58 y/o M no pmhx c/o FB in left thigh. 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 can feel the FB below the skin. small puncture wound without bleeding. last tetanus over 10 years ago. denies numbness/tingling, difficulty walking -- will xray, update tdap, 58 y/o M no pmhx c/o FB in left thigh. 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 can feel the FB below the skin. small puncture wound without bleeding. last tetanus over 10 years ago. denies numbness/tingling, difficulty walking -- will xray, update tdap,    Surgical team attempted bedside removal of nail - which appears lodged in thigh - otherwise will admit for OR removal.

## 2022-09-20 NOTE — ED PROVIDER NOTE - ATTENDING CONTRIBUTION TO CARE
Patient evaluated and seen with NP Jarett agree with above history and physical - pt examined and seen by me personally - findings as seen: Pt with left thigh injury from nail gun puncture, Otherwise nail shot into leg with pain on walking or standing - states feeling like a sticking sensation with pain to thigh, occurred just 30 min prior to presentation. Noted xray finding of nail about 1.5- 3 cm in depth Surgery team called for consultation for nail removal.

## 2022-09-20 NOTE — DISCHARGE NOTE PROVIDER - CARE PROVIDER_API CALL
Adolfo Slade)  Surgery  [address] [facility], [address]  [place] [id number]  Phone: [phone]  Fax: [phone]  Follow Up Time:

## 2022-09-20 NOTE — H&P ADULT - PROBLEM SELECTOR PLAN 1
- admit to surgery  - NPO, IVF  - analgesia prn  - DVT ppx, OOB/AAT  - OR planning for removal with fluoroscopy  - d/w Dr. Slade

## 2022-09-20 NOTE — DISCHARGE NOTE PROVIDER - NSDCFUADDINST_GEN_ALL_CORE_FT
Follow up in 7-10 days with Dr Slade. Please call the office to make an appointment. Activity as tolerated. Rest as needed. Do not lift anything heavier than 10 pounds. You may take motrin or advil for mild pain as needed, in addition to prescribed narcotic medication. Do not drive while taking narcotic pain medication. You may take over the counter stool softeners as needed. Call for any fever over 101, nausea, vomiting, severe pain, no passing of gas or bowel movement.  Follow up in 7-10 days with Dr Slade. Please call the office to make an appointment. Activity as tolerated. Rest as needed. Do not lift anything heavier than 10 pounds. You may take Motrin or Tylenol for mild pain as needed. Call for any fever over 101, nausea, vomiting, severe pain, no passing of gas or bowel movement.

## 2022-09-20 NOTE — ED PROVIDER NOTE - OBJECTIVE STATEMENT
56 y/o M no pmhx c/o FB in left thigh. 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 58 y/o M no pmhx c/o FB in left thigh. 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 can feel the FB below the skin. small puncture wound without bleeding. last tetanus over 10 years ago. denies numbness/tingling, difficulty walking

## 2022-09-20 NOTE — DISCHARGE NOTE PROVIDER - HOSPITAL COURSE
56 yo M  with no PMH who presents to ED c/o puncture in left thigh. 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 Bedside removal of nail was attempted but unable to retrieve nail. Pt then went to the OR 9/20/22 for removal of nail in left thigh.   At the time of discharge, the patient was hemodynamically stable, was tolerating PO diet, was voiding urine, was ambulating, and was comfortable with adequate pain control.

## 2022-09-20 NOTE — H&P ADULT - NS ATTEND AMEND GEN_ALL_CORE FT
Patient seen and examined with PA  Labs and imaging reviewed  Attempted removal at bedside; however unsuccessful  OR for removal of foreign body

## 2022-09-20 NOTE — ED ADULT NURSE NOTE - OBJECTIVE STATEMENT
pt presents to ed a&ox4 breathing spont/unlabored to ra c/o L puncture wound from nail gun , not actively bleeding . pt not UTD wt tetanus vax. no pmh

## 2022-09-20 NOTE — H&P ADULT - HISTORY OF PRESENT ILLNESS
56 yo M  with no PMH who presents to ED c/o puncture in left thigh. 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 He states he can feel the pressure and pain from the nail in his thigh but offers no other complaints. Denies fever, chills, N/V/D, CP, SOB, numbness, tingling, difficulty walking.

## 2022-09-20 NOTE — ED ADULT TRIAGE NOTE - WEIGHT IN LBS
190 Otitis media  right ear and URI - treated with antibiotics in Feb 2016  Ulcerative colitis  since 2001

## 2022-09-20 NOTE — H&P ADULT - NSHPPHYSICALEXAM_GEN_ALL_CORE
no obvious deformity to right knee ; + tenderness along right medial joint line without joint laxity ; Spine appears normal, range of motion is not limited, no muscle or joint tenderness
General: Appears stated age, No acute distress, WD/WN  Head: NC/AT  EENT: PERRLA. EOMI. Conjunctiva and sclera clear. Pharynx clear.  Neck: Supple.  Lungs: CTA B/l. Nonlabored Respirations  CV: +S1S2, RRR  Abdomen: Nondistended, +BS, Soft, Nontender, no guarding, no rebound  Extremities: left anterior thigh with tenderness at puncture site. No active bleeding or large wound. ROM and sensation intact  Neuro: A&Ox3.

## 2022-09-21 ENCOUNTER — TRANSCRIPTION ENCOUNTER (OUTPATIENT)
Age: 57
End: 2022-09-21

## 2022-09-21 VITALS
OXYGEN SATURATION: 99 % | SYSTOLIC BLOOD PRESSURE: 99 MMHG | TEMPERATURE: 98 F | HEART RATE: 66 BPM | RESPIRATION RATE: 17 BRPM | DIASTOLIC BLOOD PRESSURE: 60 MMHG

## 2022-09-21 LAB
ANION GAP SERPL CALC-SCNC: 4 MMOL/L — LOW (ref 5–17)
BASOPHILS # BLD AUTO: 0.01 K/UL — SIGNIFICANT CHANGE UP (ref 0–0.2)
BASOPHILS NFR BLD AUTO: 0.2 % — SIGNIFICANT CHANGE UP (ref 0–2)
BUN SERPL-MCNC: 14 MG/DL — SIGNIFICANT CHANGE UP (ref 7–23)
CALCIUM SERPL-MCNC: 9.3 MG/DL — SIGNIFICANT CHANGE UP (ref 8.5–10.1)
CHLORIDE SERPL-SCNC: 108 MMOL/L — SIGNIFICANT CHANGE UP (ref 96–108)
CO2 SERPL-SCNC: 26 MMOL/L — SIGNIFICANT CHANGE UP (ref 22–31)
CREAT SERPL-MCNC: 1.08 MG/DL — SIGNIFICANT CHANGE UP (ref 0.5–1.3)
EGFR: 80 ML/MIN/1.73M2 — SIGNIFICANT CHANGE UP
EOSINOPHIL # BLD AUTO: 0 K/UL — SIGNIFICANT CHANGE UP (ref 0–0.5)
EOSINOPHIL NFR BLD AUTO: 0 % — SIGNIFICANT CHANGE UP (ref 0–6)
GLUCOSE SERPL-MCNC: 111 MG/DL — HIGH (ref 70–99)
HCT VFR BLD CALC: 45.8 % — SIGNIFICANT CHANGE UP (ref 39–50)
HCV AB S/CO SERPL IA: 0.09 S/CO — SIGNIFICANT CHANGE UP (ref 0–0.99)
HCV AB SERPL-IMP: SIGNIFICANT CHANGE UP
HGB BLD-MCNC: 15.1 G/DL — SIGNIFICANT CHANGE UP (ref 13–17)
IMM GRANULOCYTES NFR BLD AUTO: 0.2 % — SIGNIFICANT CHANGE UP (ref 0–0.9)
LYMPHOCYTES # BLD AUTO: 1.22 K/UL — SIGNIFICANT CHANGE UP (ref 1–3.3)
LYMPHOCYTES # BLD AUTO: 22.1 % — SIGNIFICANT CHANGE UP (ref 13–44)
MAGNESIUM SERPL-MCNC: 1.8 MG/DL — SIGNIFICANT CHANGE UP (ref 1.6–2.6)
MCHC RBC-ENTMCNC: 27.9 PG — SIGNIFICANT CHANGE UP (ref 27–34)
MCHC RBC-ENTMCNC: 33 G/DL — SIGNIFICANT CHANGE UP (ref 32–36)
MCV RBC AUTO: 84.5 FL — SIGNIFICANT CHANGE UP (ref 80–100)
MONOCYTES # BLD AUTO: 0.24 K/UL — SIGNIFICANT CHANGE UP (ref 0–0.9)
MONOCYTES NFR BLD AUTO: 4.3 % — SIGNIFICANT CHANGE UP (ref 2–14)
NEUTROPHILS # BLD AUTO: 4.04 K/UL — SIGNIFICANT CHANGE UP (ref 1.8–7.4)
NEUTROPHILS NFR BLD AUTO: 73.2 % — SIGNIFICANT CHANGE UP (ref 43–77)
NRBC # BLD: 0 /100 WBCS — SIGNIFICANT CHANGE UP (ref 0–0)
PHOSPHATE SERPL-MCNC: 3.1 MG/DL — SIGNIFICANT CHANGE UP (ref 2.5–4.5)
PLATELET # BLD AUTO: 248 K/UL — SIGNIFICANT CHANGE UP (ref 150–400)
POTASSIUM SERPL-MCNC: 5.1 MMOL/L — SIGNIFICANT CHANGE UP (ref 3.5–5.3)
POTASSIUM SERPL-SCNC: 5.1 MMOL/L — SIGNIFICANT CHANGE UP (ref 3.5–5.3)
RBC # BLD: 5.42 M/UL — SIGNIFICANT CHANGE UP (ref 4.2–5.8)
RBC # FLD: 13 % — SIGNIFICANT CHANGE UP (ref 10.3–14.5)
SODIUM SERPL-SCNC: 138 MMOL/L — SIGNIFICANT CHANGE UP (ref 135–145)
WBC # BLD: 5.52 K/UL — SIGNIFICANT CHANGE UP (ref 3.8–10.5)
WBC # FLD AUTO: 5.52 K/UL — SIGNIFICANT CHANGE UP (ref 3.8–10.5)

## 2022-09-21 RX ORDER — ACETAMINOPHEN 500 MG
2 TABLET ORAL
Qty: 0 | Refills: 0 | DISCHARGE
Start: 2022-09-21

## 2022-09-21 RX ORDER — SODIUM CHLORIDE 9 MG/ML
1000 INJECTION, SOLUTION INTRAVENOUS ONCE
Refills: 0 | Status: COMPLETED | OUTPATIENT
Start: 2022-09-21 | End: 2022-09-21

## 2022-09-21 RX ADMIN — Medication 100 MILLIGRAM(S): at 13:41

## 2022-09-21 RX ADMIN — Medication 100 MILLIGRAM(S): at 05:20

## 2022-09-21 RX ADMIN — ENOXAPARIN SODIUM 40 MILLIGRAM(S): 100 INJECTION SUBCUTANEOUS at 05:20

## 2022-09-21 RX ADMIN — SODIUM CHLORIDE 1000 MILLILITER(S): 9 INJECTION, SOLUTION INTRAVENOUS at 02:05

## 2022-09-21 NOTE — PROGRESS NOTE ADULT - SUBJECTIVE AND OBJECTIVE BOX
Post-op check    S/P removal of nail in left thigh POD#0  Pt seen and examined at bedside. Incisional pain well controlled with medication. Denies pain currently. Denies chest pain, shortness of breath, nausea/ vomiting, and dizziness.     Vital Signs Last 24 Hrs  T(F): 97.9 (09-21-22 @ 01:51), Max: 99.4 (09-20-22 @ 22:48)  HR: 67 (09-21-22 @ 01:51)  BP: 91/53 (09-21-22 @ 01:51)  RR: 16 (09-21-22 @ 01:51)  SpO2: 98% (09-21-22 @ 01:51)      CONSTITUTIONAL: Alert, NAD  RESPIRATORY: Clear to auscultation bilaterally, respirations nonlabored  CARDIOVASCULAR: S1S2, Regular rate and rhythm  GASTROINTESTINAL: Soft NTND  MUSCULOSKELETAL: LLE dressing clean/dry/intact. no calf tenderness, No edema. NV intact.       Assessment: 57M S/P removal of nail in left thigh POD#0    Plan:  - diet as tolerated   - local wound care  - DVT prophylaxis, Incentive Spirometer, OOB, Ambulating, pain control  - Continue antibiotics   - f/u labs   - will discuss with surgical attending

## 2022-09-21 NOTE — DISCHARGE NOTE NURSING/CASE MANAGEMENT/SOCIAL WORK - PATIENT PORTAL LINK FT
You can access the FollowMyHealth Patient Portal offered by [facility] by registering at the following website: [email or web address]. By joining [facility]’s FollowMyHealth portal, you will also be able to view your health information using other applications (apps) compatible with our system.

## 2022-09-21 NOTE — DISCHARGE NOTE NURSING/CASE MANAGEMENT/SOCIAL WORK - NSDCPEFALRISK_GEN_ALL_CORE
For information on Fall & Injury Prevention, visit: [email or web address] OR  [email or web address] OR  https://www.cdc.gov/steadi/patient.html

## 2022-09-22 LAB — SURGICAL PATHOLOGY STUDY: SIGNIFICANT CHANGE UP

## 2022-09-23 PROBLEM — Z78.9 OTHER SPECIFIED HEALTH STATUS: Chronic | Status: ACTIVE | Noted: 2022-09-20

## 2022-09-23 PROBLEM — Z00.00 ENCOUNTER FOR PREVENTIVE HEALTH EXAMINATION: Status: ACTIVE | Noted: 2022-09-23

## 2022-09-25 DIAGNOSIS — S71.142A: ICD-10-CM
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
## 2022-09-26 ENCOUNTER — APPOINTMENT (OUTPATIENT)
Dept: SURGERY | Facility: CLINIC | Age: 57
End: 2022-09-26

## 2022-09-26 VITALS
OXYGEN SATURATION: 97 % | WEIGHT: 190 LBS | HEIGHT: 74 IN | BODY MASS INDEX: 24.38 KG/M2 | SYSTOLIC BLOOD PRESSURE: 104 MMHG | HEART RATE: 65 BPM | TEMPERATURE: 98.7 F | DIASTOLIC BLOOD PRESSURE: 67 MMHG

## 2022-09-26 PROCEDURE — 99024 POSTOP FOLLOW-UP VISIT: CPT

## 2022-09-26 NOTE — PHYSICAL EXAM
[de-identified] : comfortable, well appearing [de-identified] : breathing comfortably on room air, no cough [de-identified] : motor and sensation intact. Sutures removed. Incision without erythema or drainage.

## 2022-09-26 NOTE — HISTORY OF PRESENT ILLNESS
[de-identified] : 57 year old man who presented to [facility] with a nail in his left thigh. He underwent exploration and removal of the foreign body on 9/20/2022.  [de-identified] : He presents to the office today for a follow up visit.\par He denies any nausea, vomiting ,fever or chills.\par States the pain is improving, and he is moving his extremities without issue. No numbness or tingling. Sensation intact.

## 2022-09-26 NOTE — PLAN
[FreeTextEntry1] : 57 year old man s/p exploration of left thigh and removal of foreign body\par  - sutures removed\par - follow up as needed\par

## (undated) DEVICE — DRSG TELFA 2 X 3

## (undated) DEVICE — SUT VICRYL 3-0 27" SH UNDYED

## (undated) DEVICE — INTRO SHEATH FLX 6F24CM

## (undated) DEVICE — DRAPE LAPAROTOMY W VELCRO CORD TABS

## (undated) DEVICE — LABELS BLANK W PEN

## (undated) DEVICE — PACK MINOR WITH LAP

## (undated) DEVICE — CATH DIAG IMPULSE PIG145 6F 110CM

## (undated) DEVICE — POSITIONER STRAP ARMBOARD VELCRO TS-30

## (undated) DEVICE — Device

## (undated) DEVICE — INTRO SHEATH ART/FEM ENGAGE .035 8F12CM

## (undated) DEVICE — CATH TDILUT SWANGANZ VIP 7.5F 110CM

## (undated) DEVICE — CATH DIAG CARD PERFORMA IMA BT 4F100CM

## (undated) DEVICE — GLIDESHEATH BASIC HYDROPHILIC COATED INTRODUCER SHEATH: Brand: GLIDESHEATH

## (undated) DEVICE — PK CATH CARD 40

## (undated) DEVICE — KT INTRO SHEATH PERC W/INTEGR HEMO 8.5F 10CM AK09803CDC

## (undated) DEVICE — CATH DIAG IMPULSE FR4 5F 100CM

## (undated) DEVICE — INTRO SHEATH ART/FEM ENGAGE .035 4F12CM

## (undated) DEVICE — GW EMR FIX EXCHG J STD .035 3MM 260CM

## (undated) DEVICE — RADIFOCUS GLIDEWIRE: Brand: GLIDEWIRE

## (undated) DEVICE — GW HITORQUE/BAL MID/WT J W/HCOAT .014 3X190CM

## (undated) DEVICE — STAPLER SKIN VISI-STAT 35 WIDE

## (undated) DEVICE — DRSG COMBINE 5X9"

## (undated) DEVICE — FRA-ESU BOVIE FORCE TRIAD T7J19731DX: Type: DURABLE MEDICAL EQUIPMENT

## (undated) DEVICE — SUT VICRYL 2-0 27" SH UNDYED

## (undated) DEVICE — BASIN SET DOUBLE

## (undated) DEVICE — PREP BETADINE KIT

## (undated) DEVICE — DRSG VAC ABTHERS

## (undated) DEVICE — INTRO SHEATH ART/FEM ENGAGE .035 6F12CM

## (undated) DEVICE — KT CATH CAD SUP IMPELLA/CP 9/14F 5L

## (undated) DEVICE — PERCLOSE PROGLIDE™ SUTURE-MEDIATED CLOSURE SYSTEM: Brand: PERCLOSE PROGLIDE™

## (undated) DEVICE — KT MANIFLD CARDIAC

## (undated) DEVICE — DRSG TEGADERM 4X4.75"

## (undated) DEVICE — SYR LUER LOK 20CC

## (undated) DEVICE — CATH VENT MIV RADL PIG ST TIP 5F 110CM

## (undated) DEVICE — CATH DIAG CARD PERFORMA MPA1 BT 4F 100CM

## (undated) DEVICE — HI-TORQUE SUPRA CORE .035 PERIPHERAL GUIDE WIRE .035 X 300 CM: Brand: HI-TORQUE SUPRA CORE

## (undated) DEVICE — WARMING BLANKET FULL ADULT

## (undated) DEVICE — ELCTR GROUNDING PAD ADULT COVIDIEN

## (undated) DEVICE — SOL IRR POUR NS 0.9% 500ML

## (undated) DEVICE — VENODYNE/SCD SLEEVE CALF MEDIUM

## (undated) DEVICE — DEV INDEFLATOR P/N 580289

## (undated) DEVICE — CATH DIAG IMPULSE FL3.5 5F 100CM